# Patient Record
Sex: FEMALE | Race: WHITE | NOT HISPANIC OR LATINO | Employment: OTHER | URBAN - METROPOLITAN AREA
[De-identification: names, ages, dates, MRNs, and addresses within clinical notes are randomized per-mention and may not be internally consistent; named-entity substitution may affect disease eponyms.]

---

## 2017-01-05 ENCOUNTER — HOSPITAL ENCOUNTER (OUTPATIENT)
Dept: MAMMOGRAPHY | Facility: HOSPITAL | Age: 77
Discharge: HOME | End: 2017-01-05

## 2017-01-06 ENCOUNTER — OFFICE VISIT (OUTPATIENT)
Dept: NEUROLOGY | Facility: CLINIC | Age: 77
End: 2017-01-06

## 2017-01-06 VITALS
HEIGHT: 66 IN | DIASTOLIC BLOOD PRESSURE: 71 MMHG | SYSTOLIC BLOOD PRESSURE: 120 MMHG | BODY MASS INDEX: 28.12 KG/M2 | WEIGHT: 175 LBS

## 2017-01-06 DIAGNOSIS — G45.8 OTHER SPECIFIED TRANSIENT CEREBRAL ISCHEMIAS: Primary | ICD-10-CM

## 2017-01-06 PROCEDURE — 99213 OFFICE O/P EST LOW 20 MIN: CPT | Performed by: PSYCHIATRY & NEUROLOGY

## 2017-01-06 RX ORDER — TOPIRAMATE 25 MG/1
25 TABLET ORAL NIGHTLY
Qty: 60 TABLET | Refills: 5 | Status: SHIPPED | OUTPATIENT
Start: 2017-01-06 | End: 2017-07-07 | Stop reason: SDUPTHER

## 2017-01-06 RX ORDER — INFLUENZA A VIRUS A/MICHIGAN/45/2015 X-275 (H1N1) ANTIGEN (FORMALDEHYDE INACTIVATED), INFLUENZA A VIRUS A/SINGAPORE/INFIMH-16-0019/2016 IVR-186 (H3N2) ANTIGEN (FORMALDEHYDE INACTIVATED), AND INFLUENZA B VIRUS B/MARYLAND/15/2016 BX-69A (A B/COLORADO/6/2017-LIKE VIRUS) ANTIGEN (FORMALDEHYDE INACTIVATED) 60; 60; 60 UG/.5ML; UG/.5ML; UG/.5ML
INJECTION, SUSPENSION INTRAMUSCULAR
Refills: 0 | COMMUNITY
Start: 2016-10-18 | End: 2019-10-31

## 2017-01-06 RX ORDER — PNEUMOCOCCAL VACCINE POLYVALENT 25; 25; 25; 25; 25; 25; 25; 25; 25; 25; 25; 25; 25; 25; 25; 25; 25; 25; 25; 25; 25; 25; 25 UG/.5ML; UG/.5ML; UG/.5ML; UG/.5ML; UG/.5ML; UG/.5ML; UG/.5ML; UG/.5ML; UG/.5ML; UG/.5ML; UG/.5ML; UG/.5ML; UG/.5ML; UG/.5ML; UG/.5ML; UG/.5ML; UG/.5ML; UG/.5ML; UG/.5ML; UG/.5ML; UG/.5ML; UG/.5ML; UG/.5ML
INJECTION, SOLUTION INTRAMUSCULAR; SUBCUTANEOUS
Refills: 0 | COMMUNITY
Start: 2016-10-18 | End: 2019-10-31

## 2017-01-06 NOTE — LETTER
January 6, 2017     Mg Irby MD  [address] [facility]   [address]  [place] [address]    Patient: Brooke Molina   YOB: 1940   Date of Visit: 1/6/2017       Dear Dr. Mahamed MD:    Thank you for referring Brooke Molina to me for evaluation. Below are the relevant portions of my assessment and plan of care.    If you have questions, please do not hesitate to call me. I look forward to following Brooke along with you.         Sincerely,        Delvin Reed MD        CC: No Recipients  Delvin Reed MD  1/6/2017 12:59 PM  Signed  Subjective:     Patient ID: Brooke Molina is a 76 y.o. female.    History of Present Illness     A she continues have episodes off and on where she will get visual obscurations like looking through [place] this may not occur for a month or time or she may have clusters of these often times she has a headache associated with after the episodes occur or visual obscuration.  She's had a full vascular workup while hospitalized.  She is currently wearing a cardiac monitor and she's had no episodes over the past couple of months.  The following portions of the patient's history were reviewed and updated as appropriate: allergies, current medications, past family history, past medical history, past social history, past surgical history and problem list.      Current Outpatient Prescriptions:   •  aspirin 81 MG chewable tablet, Chew 81 mg daily., Disp: , Rfl:   •  atorvastatin (LIPITOR) 20 MG tablet, Take 20 mg by mouth 1 (one) time daily., Disp: , Rfl:   •  cholecalciferol (VITAMIN D3) 1000 UNITS tablet, Take 1,000 Units by mouth daily., Disp: , Rfl:   •  FLUZONE HIGH-DOSE 0.5 ML suspension prefilled syringe injection, INJECT 0.5 ML INTRAMUSCULARLY ONE TIME ONLY, Disp: , Rfl: 0  •  metoprolol tartrate (LOPRESSOR) 50 MG tablet, Take 50 mg by mouth 2 (two) times a day., Disp: , Rfl:   •  PNEUMOVAX 23 25 MCG/0.5ML vaccine, INJECT 0.5 ML  INTRAMUSCULARLY ONE TIME ONLY, Disp: , Rfl: 0  •  vitamin B-12 (CYANOCOBALAMIN) 500 MCG tablet, Take 500 mcg by mouth daily., Disp: , Rfl:   •  topiramate (TOPAMAX) 25 MG tablet, Take 1 tablet by mouth Every Night., Disp: 60 tablet, Rfl: 5    Review of Systems   Constitutional: Negative.    Eyes: Positive for visual disturbance. Negative for pain, discharge, redness and itching.   Gastrointestinal: Positive for nausea. Negative for abdominal distention, abdominal pain, anal bleeding, blood in stool, constipation, diarrhea, rectal pain and vomiting.   Neurological: Positive for dizziness, light-headedness, numbness and headaches. Negative for tremors, seizures, syncope, facial asymmetry, speech difficulty and weakness.   Psychiatric/Behavioral: Positive for sleep disturbance. Negative for agitation, behavioral problems, confusion, decreased concentration, dysphoric mood, hallucinations, self-injury and suicidal ideas. The patient is not nervous/anxious and is not hyperactive.         Objective:    Neurologic Exam  Mental status examination was appropriate.  Funduscopy, visual fields, eye movements and pupillary reflexes were normal.  No facial weakness was noted.  Gait was normal.  No pattern of focal weakness was noted.  Physical Exam    Assessment/Plan:     Brooke was seen today for cerebrovascular accident.    Diagnoses and all orders for this visit:    Other specified transient cerebral ischemias    Other orders  -     topiramate (TOPAMAX) 25 MG tablet; Take 1 tablet by mouth Every Night.         Certainly the episodes may be migraine equivalents her age and presentation bit atypical.  However she's had full vascular workup and continues to have cardiac evaluation.  I feel that an antimigraine medicine is certainly worth a trial.    Prescription drug management - topiramate 25 milligrams at night    Phone follow-up in 6 weeks.  Follow-up in the office in 6 months.  Thank you for allowing me to share in the care  of this patient.  Delvin Reed M.D.

## 2017-01-06 NOTE — MR AVS SNAPSHOT
Brooke Molina   1/6/2017 12:45 PM   Office Visit    Dept Phone:  [phone]   Encounter #:  [id number]    Provider:  Delvin Reed Jr., MD   Department:  [facility] NEUROLOGY                Your Full Care Plan              Today's Medication Changes          These changes are accurate as of: 1/6/17 12:51 PM.  If you have any questions, ask your nurse or doctor.               Stop taking medication(s)listed here:     clopidogrel 75 MG tablet   Commonly known as:  PLAVIX   Stopped by:  Delvin Reed Jr., MD           oxybutynin XL 10 MG 24 hr tablet   Commonly known as:  DITROPAN-XL   Stopped by:  Delvin Reed Jr., MD                      Your Updated Medication List          This list is accurate as of: 1/6/17 12:51 PM.  Always use your most recent med list.                aspirin 81 MG chewable tablet       atorvastatin 20 MG tablet   Commonly known as:  LIPITOR       cholecalciferol 1000 UNITS tablet   Commonly known as:  VITAMIN D3       FLUZONE HIGH-DOSE 0.5 ML suspension prefilled syringe injection   Generic drug:  influenza vac split high-dose       metoprolol tartrate 50 MG tablet   Commonly known as:  LOPRESSOR       PNEUMOVAX 23 25 MCG/0.5ML vaccine   Generic drug:  pneumococcal polysaccharide 23-valent       vitamin B-12 500 MCG tablet   Commonly known as:  CYANOCOBALAMIN               Instructions     None    Patient Instructions History      Upcoming Appointments     Visit Type Date Time Department    FOLLOW UP 1/6/2017 12:45 PM [facility] NEUROLOGY [place]      [place] Signup     Our records indicate that you have declined [facility] sign[facility]. If you would like to sign up for [facility], please email [email or web address] or call [phone] to obtain an activation code.             Other Info from Your Visit           Allergies     Codeine  GI Intolerance      Reason for Visit     Cerebrovascular Accident           Vital Signs     "Blood Pressure Height Weight Body Mass Index Smoking Status       120/71 66\" (167.6 cm) 175 lb (79.4 kg) 28.25 kg/m2 Never Smoker         "

## 2017-01-06 NOTE — PROGRESS NOTES
Subjective:     Patient ID: Brooke Molina is a 76 y.o. female.    History of Present Illness     A she continues have episodes off and on where she will get visual obscurations like looking through [place] this may not occur for a month or time or she may have clusters of these often times she has a headache associated with after the episodes occur or visual obscuration.  She's had a full vascular workup while hospitalized.  She is currently wearing a cardiac monitor and she's had no episodes over the past couple of months.  The following portions of the patient's history were reviewed and updated as appropriate: allergies, current medications, past family history, past medical history, past social history, past surgical history and problem list.      Current Outpatient Prescriptions:   •  aspirin 81 MG chewable tablet, Chew 81 mg daily., Disp: , Rfl:   •  atorvastatin (LIPITOR) 20 MG tablet, Take 20 mg by mouth 1 (one) time daily., Disp: , Rfl:   •  cholecalciferol (VITAMIN D3) 1000 UNITS tablet, Take 1,000 Units by mouth daily., Disp: , Rfl:   •  FLUZONE HIGH-DOSE 0.5 ML suspension prefilled syringe injection, INJECT 0.5 ML INTRAMUSCULARLY ONE TIME ONLY, Disp: , Rfl: 0  •  metoprolol tartrate (LOPRESSOR) 50 MG tablet, Take 50 mg by mouth 2 (two) times a day., Disp: , Rfl:   •  PNEUMOVAX 23 25 MCG/0.5ML vaccine, INJECT 0.5 ML INTRAMUSCULARLY ONE TIME ONLY, Disp: , Rfl: 0  •  vitamin B-12 (CYANOCOBALAMIN) 500 MCG tablet, Take 500 mcg by mouth daily., Disp: , Rfl:   •  topiramate (TOPAMAX) 25 MG tablet, Take 1 tablet by mouth Every Night., Disp: 60 tablet, Rfl: 5    Review of Systems   Constitutional: Negative.    Eyes: Positive for visual disturbance. Negative for pain, discharge, redness and itching.   Gastrointestinal: Positive for nausea. Negative for abdominal distention, abdominal pain, anal bleeding, blood in stool, constipation, diarrhea, rectal pain and vomiting.   Neurological: Positive for dizziness,  light-headedness, numbness and headaches. Negative for tremors, seizures, syncope, facial asymmetry, speech difficulty and weakness.   Psychiatric/Behavioral: Positive for sleep disturbance. Negative for agitation, behavioral problems, confusion, decreased concentration, dysphoric mood, hallucinations, self-injury and suicidal ideas. The patient is not nervous/anxious and is not hyperactive.         Objective:    Neurologic Exam  Mental status examination was appropriate.  Funduscopy, visual fields, eye movements and pupillary reflexes were normal.  No facial weakness was noted.  Gait was normal.  No pattern of focal weakness was noted.  Physical Exam    Assessment/Plan:     Brooke was seen today for cerebrovascular accident.    Diagnoses and all orders for this visit:    Other specified transient cerebral ischemias    Other orders  -     topiramate (TOPAMAX) 25 MG tablet; Take 1 tablet by mouth Every Night.         Certainly the episodes may be migraine equivalents her age and presentation bit atypical.  However she's had full vascular workup and continues to have cardiac evaluation.  I feel that an antimigraine medicine is certainly worth a trial.    Prescription drug management - topiramate 25 milligrams at night    Phone follow-up in 6 weeks.  Follow-up in the office in 6 months.  Thank you for allowing me to share in the care of this patient.  Delvin Reed M.D.

## 2017-05-02 ENCOUNTER — OFFICE (AMBULATORY)
Dept: URBAN - METROPOLITAN AREA CLINIC 64 | Facility: CLINIC | Age: 77
End: 2017-05-02

## 2017-05-02 ENCOUNTER — ON CAMPUS - OUTPATIENT (AMBULATORY)
Dept: URBAN - METROPOLITAN AREA HOSPITAL 2 | Facility: HOSPITAL | Age: 77
End: 2017-05-02

## 2017-05-02 ENCOUNTER — HOSPITAL ENCOUNTER (OUTPATIENT)
Dept: OTHER | Facility: HOSPITAL | Age: 77
Setting detail: SPECIMEN
Discharge: HOME | End: 2017-05-02
Attending: INTERNAL MEDICINE | Admitting: INTERNAL MEDICINE

## 2017-05-02 VITALS
OXYGEN SATURATION: 98 % | RESPIRATION RATE: 16 BRPM | DIASTOLIC BLOOD PRESSURE: 79 MMHG | DIASTOLIC BLOOD PRESSURE: 63 MMHG | HEIGHT: 66 IN | DIASTOLIC BLOOD PRESSURE: 54 MMHG | SYSTOLIC BLOOD PRESSURE: 122 MMHG | RESPIRATION RATE: 18 BRPM | HEART RATE: 67 BPM | DIASTOLIC BLOOD PRESSURE: 73 MMHG | WEIGHT: 168 LBS | HEART RATE: 71 BPM | DIASTOLIC BLOOD PRESSURE: 75 MMHG | HEART RATE: 65 BPM | DIASTOLIC BLOOD PRESSURE: 66 MMHG | DIASTOLIC BLOOD PRESSURE: 64 MMHG | SYSTOLIC BLOOD PRESSURE: 127 MMHG | TEMPERATURE: 97.5 F | HEART RATE: 54 BPM | OXYGEN SATURATION: 99 % | OXYGEN SATURATION: 100 % | SYSTOLIC BLOOD PRESSURE: 117 MMHG | DIASTOLIC BLOOD PRESSURE: 72 MMHG | SYSTOLIC BLOOD PRESSURE: 152 MMHG | SYSTOLIC BLOOD PRESSURE: 120 MMHG | SYSTOLIC BLOOD PRESSURE: 136 MMHG | SYSTOLIC BLOOD PRESSURE: 144 MMHG | HEART RATE: 61 BPM | SYSTOLIC BLOOD PRESSURE: 123 MMHG | HEART RATE: 63 BPM | HEART RATE: 58 BPM

## 2017-05-02 DIAGNOSIS — K57.30 DIVERTICULOSIS OF LARGE INTESTINE WITHOUT PERFORATION OR ABS: ICD-10-CM

## 2017-05-02 DIAGNOSIS — K62.1 RECTAL POLYP: ICD-10-CM

## 2017-05-02 DIAGNOSIS — Z86.010 PERSONAL HISTORY OF COLONIC POLYPS: ICD-10-CM

## 2017-05-02 LAB
GI HISTOLOGY: A. UNSPECIFIED: (no result)
GI HISTOLOGY: PDF REPORT: (no result)

## 2017-05-02 PROCEDURE — 45385 COLONOSCOPY W/LESION REMOVAL: CPT | Performed by: INTERNAL MEDICINE

## 2017-05-02 PROCEDURE — 88305 TISSUE EXAM BY PATHOLOGIST: CPT | Mod: 26 | Performed by: INTERNAL MEDICINE

## 2017-05-02 RX ADMIN — PROPOFOL: 10 INJECTION, EMULSION INTRAVENOUS at 15:00

## 2017-07-07 ENCOUNTER — OFFICE VISIT (OUTPATIENT)
Dept: NEUROLOGY | Facility: CLINIC | Age: 77
End: 2017-07-07

## 2017-07-07 VITALS
DIASTOLIC BLOOD PRESSURE: 60 MMHG | HEIGHT: 66 IN | SYSTOLIC BLOOD PRESSURE: 100 MMHG | WEIGHT: 175 LBS | BODY MASS INDEX: 28.12 KG/M2

## 2017-07-07 DIAGNOSIS — G43.109 MIGRAINE WITH AURA AND WITHOUT STATUS MIGRAINOSUS, NOT INTRACTABLE: Primary | ICD-10-CM

## 2017-07-07 PROCEDURE — 99212 OFFICE O/P EST SF 10 MIN: CPT | Performed by: PSYCHIATRY & NEUROLOGY

## 2017-07-07 RX ORDER — TOPIRAMATE 25 MG/1
25 TABLET ORAL NIGHTLY
Qty: 90 TABLET | Refills: 11 | Status: SHIPPED | OUTPATIENT
Start: 2017-07-07 | End: 2018-02-09 | Stop reason: SDUPTHER

## 2017-07-07 NOTE — PROGRESS NOTES
Subjective:     Patient ID: Brooke Molina is a 76 y.o. female.    History of Present Illness     Since starting low-dose Topamax the patient's symptoms have almost totally resolved.  She's had a couple breakthroughs but with less numbness associated with the attacks.  She is extremely happy with her current treatment.  No side effects.  No new problems.  The following portions of the patient's history were reviewed and updated as appropriate: allergies, current medications, past family history, past medical history, past social history, past surgical history and problem list.       Current Outpatient Prescriptions:   •  aspirin 81 MG chewable tablet, Chew 81 mg daily., Disp: , Rfl:   •  atorvastatin (LIPITOR) 20 MG tablet, Take 20 mg by mouth 1 (one) time daily., Disp: , Rfl:   •  cholecalciferol (VITAMIN D3) 1000 UNITS tablet, Take 1,000 Units by mouth daily., Disp: , Rfl:   •  FLUZONE HIGH-DOSE 0.5 ML suspension prefilled syringe injection, INJECT 0.5 ML INTRAMUSCULARLY ONE TIME ONLY, Disp: , Rfl: 0  •  metoprolol tartrate (LOPRESSOR) 50 MG tablet, Take 50 mg by mouth 2 (two) times a day., Disp: , Rfl:   •  PNEUMOVAX 23 25 MCG/0.5ML vaccine, INJECT 0.5 ML INTRAMUSCULARLY ONE TIME ONLY, Disp: , Rfl: 0  •  topiramate (TOPAMAX) 25 MG tablet, Take 1 tablet by mouth Every Night., Disp: 90 tablet, Rfl: 11  •  vitamin B-12 (CYANOCOBALAMIN) 500 MCG tablet, Take 500 mcg by mouth daily., Disp: , Rfl:           Review of Systems   Constitutional: Negative.    Neurological: Negative.    Psychiatric/Behavioral: Negative.         Objective:    Neurologic Exam  Mental status examination was appropriate.  Funduscopy, visual fields, eye movements and pupillary reflexes were normal.  No facial weakness was noted.  Gait was normal.  No pattern of focal weakness was noted.  Physical Exam    Assessment/Plan:     Brooke was seen today for migraine.    Diagnoses and all orders for this visit:    Migraine with aura and without status  migrainosus, not intractable    Other orders  -     topiramate (TOPAMAX) 25 MG tablet; Take 1 tablet by mouth Every Night.       Prescription drug management - topiramate as above    Follow-up in the office in one year.Thank you for allowing me to share in the care of this patient.  Delvin Reed M.D.

## 2017-09-08 ENCOUNTER — HOSPITAL ENCOUNTER (OUTPATIENT)
Dept: GENERAL RADIOLOGY | Facility: HOSPITAL | Age: 77
Discharge: HOME | End: 2017-09-08
Attending: INTERNAL MEDICINE | Admitting: INTERNAL MEDICINE

## 2017-12-12 ENCOUNTER — HOSPITAL ENCOUNTER (OUTPATIENT)
Dept: FAMILY MEDICINE CLINIC | Facility: CLINIC | Age: 77
Setting detail: SPECIMEN
Discharge: HOME | End: 2017-12-12
Attending: INTERNAL MEDICINE | Admitting: INTERNAL MEDICINE

## 2017-12-12 LAB
BACTERIA SPEC AEROBE CULT: NORMAL
BASOPHILS # BLD AUTO: 0 10*3/UL (ref 0–0.2)
BASOPHILS NFR BLD AUTO: 1 % (ref 0–2)
CK SERPL-CCNC: 68 IU/L (ref 38–234)
DIFFERENTIAL METHOD BLD: (no result)
EOSINOPHIL # BLD AUTO: 0.1 10*3/UL (ref 0–0.3)
EOSINOPHIL # BLD AUTO: 2 % (ref 0–3)
ERYTHROCYTE [DISTWIDTH] IN BLOOD BY AUTOMATED COUNT: 13.1 % (ref 11.5–14.5)
ERYTHROCYTE [SEDIMENTATION RATE] IN BLOOD BY WESTERGREN METHOD: 10 MM/HR (ref 0–30)
FERRITIN SERPL-MCNC: 97 NG/ML (ref 11–307)
HCT VFR BLD AUTO: 40.9 % (ref 35–49)
HGB BLD-MCNC: 13.8 G/DL (ref 12–15)
IRON SATN MFR SERPL: 24 % (ref 15–50)
IRON SERPL-MCNC: 85 UG/DL (ref 28–170)
LYMPHOCYTES # BLD AUTO: 1.5 10*3/UL (ref 0.8–4.8)
LYMPHOCYTES NFR BLD AUTO: 23 % (ref 18–42)
Lab: NORMAL
MCH RBC QN AUTO: 32.7 PG (ref 26–32)
MCHC RBC AUTO-ENTMCNC: 33.7 G/DL (ref 32–36)
MCV RBC AUTO: 97 FL (ref 80–94)
MICRO REPORT STATUS: NORMAL
MONOCYTES # BLD AUTO: 0.5 10*3/UL (ref 0.1–1.3)
MONOCYTES NFR BLD AUTO: 7 % (ref 2–11)
NEUTROPHILS # BLD AUTO: 4.4 10*3/UL (ref 2.3–8.6)
NEUTROPHILS NFR BLD AUTO: 67 % (ref 50–75)
NRBC BLD AUTO-RTO: 0 /100{WBCS}
NRBC/RBC NFR BLD MANUAL: 0 10*3/UL
PLATELET # BLD AUTO: 161 10*3/UL (ref 150–450)
PMV BLD AUTO: 8.5 FL (ref 7.4–10.4)
RBC # BLD AUTO: 4.21 10*6/UL (ref 4–5.4)
SPECIMEN SOURCE: NORMAL
TIBC SERPL-MCNC: 352 UG/DL (ref 228–428)
TSH SERPL-ACNC: 0.97 UIU/ML (ref 0.34–5.6)
WBC # BLD AUTO: 6.6 10*3/UL (ref 4.5–11.5)

## 2017-12-13 LAB
ANA SER QL IA: NORMAL

## 2018-02-09 ENCOUNTER — OFFICE VISIT (OUTPATIENT)
Dept: NEUROLOGY | Facility: CLINIC | Age: 78
End: 2018-02-09

## 2018-02-09 VITALS
HEIGHT: 66 IN | DIASTOLIC BLOOD PRESSURE: 80 MMHG | SYSTOLIC BLOOD PRESSURE: 120 MMHG | WEIGHT: 178 LBS | BODY MASS INDEX: 28.61 KG/M2

## 2018-02-09 DIAGNOSIS — G43.109 MIGRAINE WITH AURA AND WITHOUT STATUS MIGRAINOSUS, NOT INTRACTABLE: ICD-10-CM

## 2018-02-09 DIAGNOSIS — G45.9 TRANSIENT CEREBRAL ISCHEMIA, UNSPECIFIED TYPE: Primary | ICD-10-CM

## 2018-02-09 PROCEDURE — 99213 OFFICE O/P EST LOW 20 MIN: CPT | Performed by: PSYCHIATRY & NEUROLOGY

## 2018-02-09 RX ORDER — TEMAZEPAM 15 MG/1
CAPSULE ORAL
COMMUNITY
Start: 2017-12-13 | End: 2018-05-25 | Stop reason: ALTCHOICE

## 2018-02-09 RX ORDER — PREDNISONE 20 MG/1
20 TABLET ORAL DAILY
COMMUNITY
End: 2018-05-25 | Stop reason: ALTCHOICE

## 2018-02-09 RX ORDER — ZOLPIDEM TARTRATE 10 MG/1
TABLET ORAL
COMMUNITY
Start: 2018-01-05 | End: 2019-06-14

## 2018-02-09 RX ORDER — TOPIRAMATE 50 MG/1
50 TABLET, FILM COATED ORAL NIGHTLY
Qty: 90 TABLET | Refills: 11 | Status: SHIPPED | OUTPATIENT
Start: 2018-02-09 | End: 2018-05-25 | Stop reason: SDUPTHER

## 2018-02-09 NOTE — PROGRESS NOTES
Subjective:     Patient ID: Brooke Molina is a 77 y.o. female.    History of Present Illness     The patient has had a couple more episodes.  They both included amnesia.  One occurred in September while she was giving a talk.  She got away with it but she realizes there were several things she does not remember about what she did check another one in mid January.  This was mainly amnesia.  She's never had an EEG.  She is on topiramate 25 mg at night which has reduced her migraine type episodes or visual migraine quite a bit.  No side effects.  The following portions of the patient's history were reviewed and updated as appropriate: allergies, current medications, past family history, past medical history, past social history, past surgical history and problem list.      Current Outpatient Prescriptions:   •  aspirin 81 MG chewable tablet, Chew 81 mg daily., Disp: , Rfl:   •  atorvastatin (LIPITOR) 20 MG tablet, Take 20 mg by mouth 1 (one) time daily., Disp: , Rfl:   •  cholecalciferol (VITAMIN D3) 1000 UNITS tablet, Take 1,000 Units by mouth daily., Disp: , Rfl:   •  FLUZONE HIGH-DOSE 0.5 ML suspension prefilled syringe injection, INJECT 0.5 ML INTRAMUSCULARLY ONE TIME ONLY, Disp: , Rfl: 0  •  metoprolol tartrate (LOPRESSOR) 50 MG tablet, Take 50 mg by mouth 2 (two) times a day., Disp: , Rfl:   •  PNEUMOVAX 23 25 MCG/0.5ML vaccine, INJECT 0.5 ML INTRAMUSCULARLY ONE TIME ONLY, Disp: , Rfl: 0  •  predniSONE (DELTASONE) 20 MG tablet, Take 20 mg by mouth Daily., Disp: , Rfl:   •  temazepam (RESTORIL) 15 MG capsule, , Disp: , Rfl:   •  topiramate (TOPAMAX) 50 MG tablet, Take 1 tablet by mouth Every Night., Disp: 90 tablet, Rfl: 11  •  vitamin B-12 (CYANOCOBALAMIN) 500 MCG tablet, Take 500 mcg by mouth daily., Disp: , Rfl:   •  zolpidem (AMBIEN) 10 MG tablet, , Disp: , Rfl:     Review of Systems   Constitutional: Negative.    Neurological: Negative.    Psychiatric/Behavioral: Positive for confusion. Negative for  agitation, behavioral problems, decreased concentration, dysphoric mood, hallucinations, self-injury, sleep disturbance and suicidal ideas. The patient is not nervous/anxious and is not hyperactive.         Objective:    Neurologic Exam  Mental status examination was appropriate.  Funduscopy, visual fields, eye movements and pupillary reflexes were normal.  No facial weakness was noted.  Gait was normal.  No pattern of focal weakness was noted.  Physical Exam    Assessment/Plan:     Brooke was seen today for migraine.    Diagnoses and all orders for this visit:    Transient cerebral ischemia, unspecified type  -     MRI Brain With & Without Contrast; Future  -     EEG Awake or Asleep Routine; Future    Migraine with aura and without status migrainosus, not intractable  -     EEG Awake or Asleep Routine; Future    Other orders  -     topiramate (TOPAMAX) 50 MG tablet; Take 1 tablet by mouth Every Night.       She has never had an EEG at the episodes are probably atypical migraine or partial seizure.  I have set up an MRI the brain with and without contrast and EEG.  Also increased topiramate to 50 mg at night.  Call after testing.  Follow up in the office in 3 months.   Thank you for allowing me to share in the care of this patient.  Delvin Reed M.D.

## 2018-02-19 ENCOUNTER — HOSPITAL ENCOUNTER (OUTPATIENT)
Dept: OTHER | Facility: HOSPITAL | Age: 78
Discharge: HOME | End: 2018-02-19
Attending: PSYCHIATRY & NEUROLOGY | Admitting: PSYCHIATRY & NEUROLOGY

## 2018-02-19 ENCOUNTER — OUTSIDE FACILITY SERVICE (OUTPATIENT)
Dept: NEUROLOGY | Facility: CLINIC | Age: 78
End: 2018-02-19

## 2018-02-19 LAB — CREAT BLDA-MCNC: 0.7 MG/DL (ref 0.6–1.3)

## 2018-02-19 PROCEDURE — 95816 EEG AWAKE AND DROWSY: CPT | Performed by: PSYCHIATRY & NEUROLOGY

## 2018-05-25 ENCOUNTER — OFFICE VISIT (OUTPATIENT)
Dept: NEUROLOGY | Facility: CLINIC | Age: 78
End: 2018-05-25

## 2018-05-25 VITALS
WEIGHT: 177 LBS | BODY MASS INDEX: 28.45 KG/M2 | SYSTOLIC BLOOD PRESSURE: 110 MMHG | HEIGHT: 66 IN | DIASTOLIC BLOOD PRESSURE: 70 MMHG

## 2018-05-25 DIAGNOSIS — G43.109 MIGRAINE WITH AURA AND WITHOUT STATUS MIGRAINOSUS, NOT INTRACTABLE: Primary | ICD-10-CM

## 2018-05-25 DIAGNOSIS — G45.1 HEMISPHERIC CAROTID ARTERY SYNDROME: ICD-10-CM

## 2018-05-25 PROCEDURE — 99213 OFFICE O/P EST LOW 20 MIN: CPT | Performed by: PSYCHIATRY & NEUROLOGY

## 2018-05-25 RX ORDER — DIPHENOXYLATE HYDROCHLORIDE AND ATROPINE SULFATE 2.5; .025 MG/1; MG/1
1 TABLET ORAL DAILY
COMMUNITY

## 2018-05-25 RX ORDER — CHLORAL HYDRATE 500 MG
1000 CAPSULE ORAL
COMMUNITY

## 2018-05-25 RX ORDER — TOPIRAMATE 50 MG/1
50 TABLET, FILM COATED ORAL NIGHTLY
Qty: 90 TABLET | Refills: 3 | Status: SHIPPED | OUTPATIENT
Start: 2018-05-25 | End: 2019-05-25

## 2018-05-25 NOTE — PROGRESS NOTES
Subjective:     Patient ID: Brooke Molina is a 77 y.o. female.    History of Present Illness     No more episodes since her last visit.  Tolerating topiramate 50 mg at night well.  Since her last visit she had an MRI the brain which showed small vessel disease and an EEG that was normal.  No new problems.  Happy with her current treatment.  The following portions of the patient's history were reviewed and updated as appropriate: allergies, current medications, past family history, past medical history, past social history, past surgical history and problem list.      Current Outpatient Prescriptions:   •  aspirin 81 MG chewable tablet, Chew 81 mg daily., Disp: , Rfl:   •  atorvastatin (LIPITOR) 20 MG tablet, Take 20 mg by mouth 1 (one) time daily., Disp: , Rfl:   •  cholecalciferol (VITAMIN D3) 1000 UNITS tablet, Take 1,000 Units by mouth daily., Disp: , Rfl:   •  FLUZONE HIGH-DOSE 0.5 ML suspension prefilled syringe injection, INJECT 0.5 ML INTRAMUSCULARLY ONE TIME ONLY, Disp: , Rfl: 0  •  metoprolol tartrate (LOPRESSOR) 50 MG tablet, Take 50 mg by mouth 2 (two) times a day., Disp: , Rfl:   •  Multiple Vitamin (MULTI VITAMIN DAILY PO), Take  by mouth., Disp: , Rfl:   •  Omega-3 Fatty Acids (FISH OIL) 1000 MG capsule capsule, Take  by mouth Daily With Breakfast., Disp: , Rfl:   •  PNEUMOVAX 23 25 MCG/0.5ML vaccine, INJECT 0.5 ML INTRAMUSCULARLY ONE TIME ONLY, Disp: , Rfl: 0  •  topiramate (TOPAMAX) 50 MG tablet, Take 1 tablet by mouth Every Night., Disp: 90 tablet, Rfl: 3  •  vitamin B-12 (CYANOCOBALAMIN) 500 MCG tablet, Take 500 mcg by mouth daily., Disp: , Rfl:   •  zolpidem (AMBIEN) 10 MG tablet, , Disp: , Rfl:     Review of Systems   Constitutional: Negative.    Neurological: Negative.    Psychiatric/Behavioral: Negative.         Objective:    Neurologic Exam  Mental status examination was appropriate.  Funduscopy, visual fields, eye movements and pupillary reflexes were normal.  No facial weakness was  noted.  Gait was normal.  No pattern of focal weakness was noted.  Physical Exam    Assessment/Plan:     Brooke was seen today for transient ischemic attack and migraine.    Diagnoses and all orders for this visit:    Migraine with aura and without status migrainosus, not intractable    Hemispheric carotid artery syndrome    Other orders  -     topiramate (TOPAMAX) 50 MG tablet; Take 1 tablet by mouth Every Night.         Prescription drug management - meds as above    Follow-up in the office in one year. Thank you for allowing me to share in the care of this patient.  Delvin Reed M.D.

## 2018-08-15 ENCOUNTER — HOSPITAL ENCOUNTER (OUTPATIENT)
Dept: MAMMOGRAPHY | Facility: HOSPITAL | Age: 78
Discharge: HOME | End: 2018-08-15

## 2018-08-16 ENCOUNTER — HOSPITAL ENCOUNTER (OUTPATIENT)
Dept: CARDIOLOGY | Facility: HOSPITAL | Age: 78
Discharge: HOME | End: 2018-08-16
Attending: INTERNAL MEDICINE | Admitting: INTERNAL MEDICINE

## 2018-08-24 ENCOUNTER — HOSPITAL ENCOUNTER (OUTPATIENT)
Dept: MAMMOGRAPHY | Facility: HOSPITAL | Age: 78
Discharge: HOME | End: 2018-08-24
Attending: INTERNAL MEDICINE | Admitting: INTERNAL MEDICINE

## 2018-09-01 ENCOUNTER — HOSPITAL ENCOUNTER (OUTPATIENT)
Dept: GENERAL RADIOLOGY | Facility: HOSPITAL | Age: 78
Discharge: HOME | End: 2018-09-01
Attending: INTERNAL MEDICINE | Admitting: INTERNAL MEDICINE

## 2018-12-28 ENCOUNTER — OFFICE (AMBULATORY)
Dept: URBAN - METROPOLITAN AREA CLINIC 64 | Facility: CLINIC | Age: 78
End: 2018-12-28

## 2018-12-28 VITALS
HEIGHT: 66 IN | WEIGHT: 200 LBS | SYSTOLIC BLOOD PRESSURE: 126 MMHG | HEART RATE: 65 BPM | DIASTOLIC BLOOD PRESSURE: 81 MMHG

## 2018-12-28 DIAGNOSIS — K21.9 GASTRO-ESOPHAGEAL REFLUX DISEASE WITHOUT ESOPHAGITIS: ICD-10-CM

## 2018-12-28 DIAGNOSIS — R05 COUGH: ICD-10-CM

## 2018-12-28 DIAGNOSIS — R07.9 CHEST PAIN, UNSPECIFIED: ICD-10-CM

## 2018-12-28 PROCEDURE — 99214 OFFICE O/P EST MOD 30 MIN: CPT | Performed by: NURSE PRACTITIONER

## 2018-12-28 RX ORDER — RANITIDINE 300 MG/1
300 TABLET ORAL
Qty: 30 | Refills: 11 | Status: COMPLETED
Start: 2018-12-28 | End: 2019-01-22

## 2019-01-22 ENCOUNTER — HOSPITAL ENCOUNTER (OUTPATIENT)
Dept: GASTROENTEROLOGY | Facility: HOSPITAL | Age: 79
Setting detail: HOSPITAL OUTPATIENT SURGERY
Discharge: HOME | End: 2019-01-22
Attending: INTERNAL MEDICINE | Admitting: INTERNAL MEDICINE

## 2019-01-22 ENCOUNTER — ON CAMPUS - OUTPATIENT (AMBULATORY)
Dept: URBAN - METROPOLITAN AREA HOSPITAL 85 | Facility: HOSPITAL | Age: 79
End: 2019-01-22

## 2019-01-22 DIAGNOSIS — K21.9 GASTRO-ESOPHAGEAL REFLUX DISEASE WITHOUT ESOPHAGITIS: ICD-10-CM

## 2019-01-22 DIAGNOSIS — R05 COUGH: ICD-10-CM

## 2019-01-22 PROCEDURE — 43235 EGD DIAGNOSTIC BRUSH WASH: CPT | Performed by: INTERNAL MEDICINE

## 2019-01-24 ENCOUNTER — HOSPITAL ENCOUNTER (OUTPATIENT)
Dept: NUCLEAR MEDICINE | Facility: HOSPITAL | Age: 79
Discharge: HOME | End: 2019-01-24
Attending: INTERNAL MEDICINE | Admitting: INTERNAL MEDICINE

## 2019-01-29 ENCOUNTER — HOSPITAL ENCOUNTER (OUTPATIENT)
Dept: FAMILY MEDICINE CLINIC | Facility: CLINIC | Age: 79
Setting detail: SPECIMEN
Discharge: HOME | End: 2019-01-29
Attending: INTERNAL MEDICINE | Admitting: INTERNAL MEDICINE

## 2019-01-29 LAB
ALBUMIN SERPL-MCNC: 3.7 G/DL (ref 3.5–4.8)
ALBUMIN/GLOB SERPL: 1.4 {RATIO} (ref 1–1.7)
ALP SERPL-CCNC: 60 IU/L (ref 32–91)
ALT SERPL-CCNC: 29 IU/L (ref 14–54)
ANION GAP SERPL CALC-SCNC: 13 MMOL/L (ref 10–20)
AST SERPL-CCNC: 22 IU/L (ref 15–41)
BILIRUB SERPL-MCNC: 0.5 MG/DL (ref 0.3–1.2)
BUN SERPL-MCNC: 20 MG/DL (ref 8–20)
BUN/CREAT SERPL: 28.6 (ref 5.4–26.2)
CALCIUM SERPL-MCNC: 9.4 MG/DL (ref 8.9–10.3)
CHLORIDE SERPL-SCNC: 104 MMOL/L (ref 101–111)
CHOLEST SERPL-MCNC: 165 MG/DL
CHOLEST/HDLC SERPL: 2.4 {RATIO}
CONV CO2: 25 MMOL/L (ref 22–32)
CONV LDL CHOLESTEROL DIRECT: 82 MG/DL (ref 0–100)
CONV TOTAL PROTEIN: 6.3 G/DL (ref 6.1–7.9)
CREAT UR-MCNC: 0.7 MG/DL (ref 0.4–1)
GLOBULIN UR ELPH-MCNC: 2.6 G/DL (ref 2.5–3.8)
GLUCOSE SERPL-MCNC: 88 MG/DL (ref 65–99)
HDLC SERPL-MCNC: 70 MG/DL
LDLC/HDLC SERPL: 1.2 {RATIO}
LIPID INTERPRETATION: NORMAL
MAGNESIUM SERPL-MCNC: 1.9 MG/DL (ref 1.8–2.5)
POTASSIUM SERPL-SCNC: 4 MMOL/L (ref 3.6–5.1)
SODIUM SERPL-SCNC: 138 MMOL/L (ref 136–144)
TRIGL SERPL-MCNC: 107 MG/DL
VLDLC SERPL CALC-MCNC: 13.4 MG/DL

## 2019-03-19 ENCOUNTER — OFFICE (AMBULATORY)
Dept: URBAN - METROPOLITAN AREA CLINIC 64 | Facility: CLINIC | Age: 79
End: 2019-03-19

## 2019-03-19 VITALS
SYSTOLIC BLOOD PRESSURE: 119 MMHG | HEIGHT: 66 IN | WEIGHT: 194 LBS | DIASTOLIC BLOOD PRESSURE: 50 MMHG | HEART RATE: 62 BPM

## 2019-03-19 DIAGNOSIS — R05 COUGH: ICD-10-CM

## 2019-03-19 DIAGNOSIS — K21.9 GASTRO-ESOPHAGEAL REFLUX DISEASE WITHOUT ESOPHAGITIS: ICD-10-CM

## 2019-03-19 PROCEDURE — 99213 OFFICE O/P EST LOW 20 MIN: CPT | Performed by: NURSE PRACTITIONER

## 2019-06-14 ENCOUNTER — OFFICE VISIT (OUTPATIENT)
Dept: NEUROLOGY | Facility: CLINIC | Age: 79
End: 2019-06-14

## 2019-06-14 VITALS
BODY MASS INDEX: 29.57 KG/M2 | SYSTOLIC BLOOD PRESSURE: 119 MMHG | WEIGHT: 184 LBS | HEIGHT: 66 IN | DIASTOLIC BLOOD PRESSURE: 64 MMHG

## 2019-06-14 DIAGNOSIS — G43.109 MIGRAINE WITH AURA AND WITHOUT STATUS MIGRAINOSUS, NOT INTRACTABLE: ICD-10-CM

## 2019-06-14 DIAGNOSIS — G45.9 TRANSIENT CEREBRAL ISCHEMIA, UNSPECIFIED TYPE: Primary | ICD-10-CM

## 2019-06-14 PROCEDURE — 99213 OFFICE O/P EST LOW 20 MIN: CPT | Performed by: PSYCHIATRY & NEUROLOGY

## 2019-06-14 RX ORDER — TOPIRAMATE 50 MG/1
50 TABLET, FILM COATED ORAL DAILY
Qty: 90 TABLET | Refills: 3 | Status: SHIPPED | OUTPATIENT
Start: 2019-06-14 | End: 2019-09-06 | Stop reason: SDUPTHER

## 2019-06-14 RX ORDER — TOPIRAMATE 50 MG/1
50 TABLET, FILM COATED ORAL DAILY
COMMUNITY
End: 2019-06-14 | Stop reason: SDUPTHER

## 2019-06-14 RX ORDER — PANTOPRAZOLE SODIUM 40 MG/1
40 TABLET, DELAYED RELEASE ORAL 2 TIMES DAILY
COMMUNITY
End: 2020-01-07

## 2019-06-14 NOTE — PROGRESS NOTES
Subjective:     Patient ID: Brooke Molina is a 78 y.o. female.    History of Present Illness    The patient has had headaches intermittently at times.  No strokelike symptoms.  She did have a cluster of headaches about 2 weeks ago.  Otherwise she has been doing fairly well.  She is on topiramate 50 mg at night without side effects.  No more amnestic episodes.  The following portions of the patient's history were reviewed and updated as appropriate: allergies, current medications, past family history, past medical history, past social history, past surgical history and problem list.      Current Outpatient Medications:   •  aspirin 81 MG chewable tablet, Chew 81 mg daily., Disp: , Rfl:   •  cholecalciferol (VITAMIN D3) 1000 UNITS tablet, Take 1,000 Units by mouth daily., Disp: , Rfl:   •  FLUZONE HIGH-DOSE 0.5 ML suspension prefilled syringe injection, INJECT 0.5 ML INTRAMUSCULARLY ONE TIME ONLY, Disp: , Rfl: 0  •  metoprolol tartrate (LOPRESSOR) 50 MG tablet, Take 50 mg by mouth 2 (two) times a day., Disp: , Rfl:   •  Multiple Vitamin (MULTI VITAMIN DAILY PO), Take  by mouth., Disp: , Rfl:   •  Omega-3 Fatty Acids (FISH OIL) 1000 MG capsule capsule, Take  by mouth Daily With Breakfast., Disp: , Rfl:   •  pantoprazole (PROTONIX) 40 MG EC tablet, Take 40 mg by mouth 2 (Two) Times a Day., Disp: , Rfl:   •  PNEUMOVAX 23 25 MCG/0.5ML vaccine, INJECT 0.5 ML INTRAMUSCULARLY ONE TIME ONLY, Disp: , Rfl: 0  •  sertraline (ZOLOFT) 50 MG tablet, Take 50 mg by mouth Daily., Disp: , Rfl:   •  topiramate (TOPAMAX) 50 MG tablet, Take 1 tablet by mouth Daily., Disp: 90 tablet, Rfl: 3  •  vitamin B-12 (CYANOCOBALAMIN) 500 MCG tablet, Take 500 mcg by mouth daily., Disp: , Rfl:   •  atorvastatin (LIPITOR) 20 MG tablet, Take 20 mg by mouth 1 (one) time daily., Disp: , Rfl:     Review of Systems   Constitutional: Negative.    Neurological: Positive for headaches. Negative for dizziness, tremors, seizures, syncope, facial asymmetry,  speech difficulty, weakness, light-headedness and numbness.   Psychiatric/Behavioral: Negative.           I have reviewed ROS completed by medical assistant.     Objective:    Neurologic Exam  Mental status examination was appropriate.  Funduscopy, visual fields, eye movements and pupillary reflexes were normal.  No facial weakness was noted.  Gait was normal.  No pattern of focal weakness was noted.  Physical Exam    Assessment/Plan:     Brooke was seen today for migraine.    Diagnoses and all orders for this visit:    Transient cerebral ischemia, unspecified type    Migraine with aura and without status migrainosus, not intractable    Other orders  -     topiramate (TOPAMAX) 50 MG tablet; Take 1 tablet by mouth Daily.         Alida alternatives for care.  I do not feel she is a candidate for triptan.  Prescription drug management - meds as above    Follow-up in the office in one year. Thank you for allowing me to share in the care of this patient.  Delvin Reed M.D.

## 2019-06-18 ENCOUNTER — TRANSCRIBE ORDERS (OUTPATIENT)
Dept: PHYSICAL THERAPY | Facility: CLINIC | Age: 79
End: 2019-06-18

## 2019-06-18 DIAGNOSIS — M51.36 DEGENERATION OF LUMBAR INTERVERTEBRAL DISC: Primary | ICD-10-CM

## 2019-06-18 DIAGNOSIS — M54.16 LUMBAR RADICULOPATHY: ICD-10-CM

## 2019-06-19 ENCOUNTER — OFFICE VISIT (OUTPATIENT)
Dept: FAMILY MEDICINE CLINIC | Facility: CLINIC | Age: 79
End: 2019-06-19

## 2019-06-19 VITALS
WEIGHT: 187.6 LBS | RESPIRATION RATE: 16 BRPM | OXYGEN SATURATION: 95 % | BODY MASS INDEX: 30.15 KG/M2 | TEMPERATURE: 98 F | HEIGHT: 66 IN | SYSTOLIC BLOOD PRESSURE: 108 MMHG | HEART RATE: 70 BPM | DIASTOLIC BLOOD PRESSURE: 54 MMHG

## 2019-06-19 DIAGNOSIS — M51.37 DDD (DEGENERATIVE DISC DISEASE), LUMBOSACRAL: ICD-10-CM

## 2019-06-19 DIAGNOSIS — E78.01 FAMILIAL HYPERCHOLESTEROLEMIA: ICD-10-CM

## 2019-06-19 DIAGNOSIS — M17.11 PRIMARY OSTEOARTHRITIS OF RIGHT KNEE: ICD-10-CM

## 2019-06-19 DIAGNOSIS — K21.9 GASTROESOPHAGEAL REFLUX DISEASE WITHOUT ESOPHAGITIS: Primary | ICD-10-CM

## 2019-06-19 PROBLEM — F43.21 SITUATIONAL DEPRESSION: Status: ACTIVE | Noted: 2019-06-19

## 2019-06-19 PROBLEM — M51.379 DDD (DEGENERATIVE DISC DISEASE), LUMBOSACRAL: Status: ACTIVE | Noted: 2019-06-19

## 2019-06-19 PROBLEM — E78.5 HYPERLIPIDEMIA LDL GOAL <130: Status: ACTIVE | Noted: 2019-06-19

## 2019-06-19 PROBLEM — E78.5 HYPERLIPIDEMIA: Status: ACTIVE | Noted: 2019-06-19

## 2019-06-19 PROBLEM — I42.9 CARDIOMYOPATHY (HCC): Status: ACTIVE | Noted: 2019-06-19

## 2019-06-19 PROBLEM — M17.9 DJD (DEGENERATIVE JOINT DISEASE) OF KNEE: Status: ACTIVE | Noted: 2019-06-19

## 2019-06-19 PROBLEM — J38.5 LARYNGEAL SPASM: Status: ACTIVE | Noted: 2019-06-19

## 2019-06-19 PROBLEM — M19.90 OSTEOARTHRITIS: Status: ACTIVE | Noted: 2019-02-20

## 2019-06-19 PROBLEM — I38 VALVULAR HEART DISEASE: Status: ACTIVE | Noted: 2019-06-19

## 2019-06-19 PROBLEM — I10 HYPERTENSION: Status: ACTIVE | Noted: 2019-06-19

## 2019-06-19 PROBLEM — I35.1 AORTIC INSUFFICIENCY: Status: ACTIVE | Noted: 2019-06-19

## 2019-06-19 LAB
ALBUMIN SERPL-MCNC: 4.1 G/DL (ref 3.5–4.8)
ALBUMIN/GLOB SERPL: 1.7 G/DL (ref 1–1.7)
ALP SERPL-CCNC: 48 U/L (ref 32–91)
ALT SERPL W P-5'-P-CCNC: 23 U/L (ref 14–54)
ANION GAP SERPL CALCULATED.3IONS-SCNC: 9 MMOL/L (ref 10–20)
ARTICHOKE IGE QN: 164 MG/DL (ref 0–100)
AST SERPL-CCNC: 22 U/L (ref 15–41)
BILIRUB SERPL-MCNC: 0.7 MG/DL (ref 0.3–1.2)
BUN BLD-MCNC: 20 MG/DL (ref 8–20)
BUN/CREAT SERPL: 28.6 (ref 5.4–26.2)
CALCIUM SPEC-SCNC: 9.8 MG/DL (ref 8.9–10.3)
CHLORIDE SERPL-SCNC: 103 MMOL/L (ref 101–111)
CHOLEST SERPL-MCNC: 242 MG/DL
CO2 SERPL-SCNC: 25 MMOL/L (ref 22–32)
CREAT BLD-MCNC: 0.7 MG/DL (ref 0.4–1)
GFR SERPL CREATININE-BSD FRML MDRD: 81 ML/MIN/1.73
GLOBULIN UR ELPH-MCNC: 2.4 GM/DL (ref 2.5–3.8)
GLUCOSE BLD-MCNC: 105 MG/DL (ref 65–99)
HDLC SERPL QL: 3.41
HDLC SERPL-MCNC: 71 MG/DL
LDLC/HDLC SERPL: 2.23 {RATIO}
POTASSIUM BLD-SCNC: 4.3 MMOL/L (ref 3.6–5.1)
PROT SERPL-MCNC: 6.5 G/DL (ref 6.1–7.9)
SODIUM BLD-SCNC: 137 MMOL/L (ref 136–144)
TRIGL SERPL-MCNC: 62 MG/DL
VLDLC SERPL-MCNC: 12.4 MG/DL

## 2019-06-19 PROCEDURE — 36415 COLL VENOUS BLD VENIPUNCTURE: CPT | Performed by: INTERNAL MEDICINE

## 2019-06-19 PROCEDURE — 80061 LIPID PANEL: CPT | Performed by: INTERNAL MEDICINE

## 2019-06-19 PROCEDURE — 99214 OFFICE O/P EST MOD 30 MIN: CPT | Performed by: INTERNAL MEDICINE

## 2019-06-19 PROCEDURE — 80053 COMPREHEN METABOLIC PANEL: CPT | Performed by: INTERNAL MEDICINE

## 2019-06-19 RX ORDER — ROSUVASTATIN CALCIUM 5 MG/1
5 TABLET, COATED ORAL DAILY
Qty: 30 TABLET | Refills: 5 | Status: SHIPPED | OUTPATIENT
Start: 2019-06-19 | End: 2019-07-18 | Stop reason: SDUPTHER

## 2019-06-19 RX ORDER — METOPROLOL TARTRATE 50 MG/1
TABLET, FILM COATED ORAL
Qty: 180 TABLET | Refills: 1 | Status: SHIPPED | OUTPATIENT
Start: 2019-06-19 | End: 2019-12-30

## 2019-06-19 RX ORDER — MELOXICAM 15 MG/1
TABLET ORAL
COMMUNITY
Start: 2019-06-18 | End: 2019-10-31

## 2019-06-19 NOTE — PROGRESS NOTES
"Rooming Tab(CC,VS,Pt Hx,Fall Screen)  Chief Complaint   Patient presents with   • Hyperlipidemia       Subjective   Pt here for several reasons  1. Needs to get cholesterol checked- went off the medication  - the pain in legs may have been better- but thinks some from back to- had to quit tennis  2. Read article and worried that on 2 of PPI daily and could be causing issues with her kidneys and afraid to take 2/day but the cough has relieved and doesn't want the larnygneal reflux back  3. Seen Ortho- had xray hip and spine and showed DDD- feels weak in legs and  To get PT set up- unsure of walking is good or bad.  Long discussion about the weakness and need for muscle strengthening- no radicular pain. definitely a postural effect. Does not walk on beach  I have reviewed and updated her medications, medical history and problem list during today's office visit.     Patient Care Team:  Provider, No Known as PCP - Seema Gutierrez MD as PCP - Claims Attributed    Problem List Tab  Medications Tab  Synopsis Tab  Chart Review Tab  Care Everywhere Tab  Immunizations Tab  Patient History Tab    Social History     Tobacco Use   • Smoking status: Never Smoker   • Smokeless tobacco: Never Used   Substance Use Topics   • Alcohol use: Yes     Comment: Social       Review of Systems   Constitutional: Negative for fatigue.   Genitourinary: Negative for hematuria.   Musculoskeletal: Positive for back pain.   Neurological: Positive for weakness. Negative for dizziness and confusion.       Objective     Rooming Tab(CC,VS,Pt Hx,Fall Screen)  /54 (BP Location: Left arm, Patient Position: Sitting)   Pulse 70   Temp 98 °F (36.7 °C) (Oral)   Resp 16   Ht 167.6 cm (66\")   Wt 85.1 kg (187 lb 9.6 oz)   SpO2 95%   BMI 30.28 kg/m²     Body mass index is 30.28 kg/m².    Physical Exam   Constitutional: She appears well-developed and well-nourished.   HENT:   Head: Normocephalic and atraumatic.   Neck: Normal range of " motion.   Cardiovascular: Normal rate, regular rhythm and normal heart sounds.   Pulmonary/Chest: Effort normal and breath sounds normal.   Abdominal: Soft. Bowel sounds are normal. She exhibits no distension.   Musculoskeletal: She exhibits tenderness and deformity.   OA in lower back and knees   Neurological: She displays normal reflexes. Coordination normal.   Nursing note and vitals reviewed.       Statin Choice Calculator  Data Reviewed:                   Assessment/Plan   Order Review Tab  Health Maintenance Tab  Patient Plan/Order Tab  Diagnoses and all orders for this visit:    1. Gastroesophageal reflux disease without esophagitis (Primary)  Assessment & Plan:  Will try to decrease PPI to 1/day      2. DDD (degenerative disc disease), lumbosacral  Comments:  has PT already scheduled    3. Primary osteoarthritis of right knee    4. Familial hypercholesterolemia  Comments:   went off meds because of msucle pains- here to get rechecked  Orders:  -     Comprehensive Metabolic Panel  -     Lipid Panel      Wrapup Tab  Return in about 6 months (around 12/19/2019), or if symptoms worsen or fail to improve, for Recheck, Next scheduled follow up.

## 2019-06-19 NOTE — PROGRESS NOTES
Please tell pt that cholesterol increased LDL from 82 to 164- will need to restart cholesterol medication- Rx sent to pharmacy

## 2019-06-19 NOTE — PATIENT INSTRUCTIONS
Will call patient if needs to restart the  Statins   to start PT through ortho next week after  Vacation   to wear shoes always on beach in [place]- get inserts   will try to wean down PPI to every other day 1/day

## 2019-07-01 ENCOUNTER — OFFICE VISIT (OUTPATIENT)
Dept: PHYSICAL THERAPY | Facility: CLINIC | Age: 79
End: 2019-07-01

## 2019-07-01 DIAGNOSIS — G89.29 CHRONIC LEFT-SIDED LOW BACK PAIN WITH LEFT-SIDED SCIATICA: Primary | ICD-10-CM

## 2019-07-01 DIAGNOSIS — M54.42 CHRONIC LEFT-SIDED LOW BACK PAIN WITH LEFT-SIDED SCIATICA: Primary | ICD-10-CM

## 2019-07-01 PROCEDURE — 97110 THERAPEUTIC EXERCISES: CPT | Performed by: PHYSICAL THERAPIST

## 2019-07-01 PROCEDURE — 97162 PT EVAL MOD COMPLEX 30 MIN: CPT | Performed by: PHYSICAL THERAPIST

## 2019-07-01 NOTE — PROGRESS NOTES
"  Physical Therapy Initial Evaluation and Plan of Care        Patient: Brooke Molina   : 1940  Diagnosis/ICD-10 Code:  Chronic left-sided low back pain with left-sided sciatica [M54.42, G89.29]  Referring practitioner: Ridge Bear MD  Date of Initial Visit: 2019  Today's Date: 2019  Patient seen for 1 sessions           Subjective Questionnaire: Oswestry: 18%      Subjective Evaluation    History of Present Illness  Date of onset: 2018  Mechanism of injury: Patient presents to physical therapy with chief complaint of pain/weakness in LLE and occasional lower back pain that has been present for the past 9 months.  Patient reports that recently she has had to stop playing tennis due to her left knee beginning to \"give out\".  Patient also reports recent onset of left hip and lower back pain.  Patient's main concern at this time is falling.  Patient recently had x-ray and MRI on left knee and lower back, both finding arthritis.  Patient is retired, however wishes to maintain active lifestyle.      Quality of life: excellent    Pain  Current pain ratin  At worst pain rating: 3  Quality: burning  Relieving factors: ice, heat and medications  Aggravating factors: prolonged positioning, lifting, movement, standing, squatting and stairs  Progression: improved    Diagnostic Tests  X-ray: abnormal  MRI studies: abnormal    Patient Goals  Patient goals for therapy: decreased pain, improved balance, increased strength, independence with ADLs/IADLs and return to sport/leisure activities             Objective       Tenderness     Left Hip   Tenderness in the PSIS.     Active Range of Motion     Lumbar   Flexion: WFL  Extension: with pain  Left lateral flexion: WFL  Right lateral flexion: WFL  Left rotation: WFL  Right rotation: WFL    Strength/Myotome Testing     Left Hip   Planes of Motion   Flexion: 5  Extension: 5  Abduction: 5  Adduction: 5  External rotation: 5  Internal rotation: " 4    Right Hip   Planes of Motion   Flexion: 5  Extension: 5  Abduction: 5  Adduction: 5  External rotation: 5  Internal rotation: 5    Left Knee   Flexion: 4  Extension: 5    Right Knee   Flexion: 5  Extension: 5    Left Ankle/Foot   Dorsiflexion: 4+    Right Ankle/Foot   Dorsiflexion: 4+    Tests     Lumbar     Left   Positive quadrant.   Negative crossed SLR and passive SLR.     Right   Negative crossed SLR, passive SLR and quadrant.          Assessment & Plan     Assessment  Impairments: abnormal or restricted ROM, impaired physical strength, lacks appropriate home exercise program and pain with function  Assessment details: Patient presents to physical therapy with s/s congruent with MD diagnosis of lumbar radiculopathy.  Patient demonstrates weakness in LLE, pain in lumbar spine with movement/palpation, and edema noted in left knee.  Patient reports relief of symptoms with positional distraction.  Patient also demonstrates proper technique with newly added lumbar stabilization exercise.  Patient appropriate for physical therapy intervention in order to address these deficits so that she may return to ADL's without pain or limitation.   Prognosis: good  Functional Limitations: lifting, walking, pulling, sitting and standing  Goals  Plan Goals: In two weeks, patient will demonstrates at least 50% improvement in lumbar AROM.   In two weeks, patient will report at least 25% reduction in pain.     In four weeks, patient will demonstrate 5/5 muscular strength in BLE's.   In four weeks, patient will demonstrate full lumbar AROM.   In four weeks, patient will demonstrate decreased perceived disability by decreasing score on revised oswestry by at least 8 points (18% on eval).     Plan  Therapy options: will be seen for skilled physical therapy services  Planned modality interventions: thermotherapy (hydrocollator packs), traction, electrical stimulation/Russian stimulation and cryotherapy  Planned therapy  interventions: home exercise program, joint mobilization, soft tissue mobilization, neuromuscular re-education, manual therapy, spinal/joint mobilization, strengthening and stretching  Frequency: 2x week  Duration in visits: 12  Plan details: Progress with modalities/manual therapy to reduce pain and improve AROM.  Progress with strengthening as symptoms decrease.         Manual Therapy:         mins  50693;  Therapeutic Exercise:    30     mins  72901;     Neuromuscular Leeroy:        mins  20625;    Therapeutic Activity:          mins  09004;     Gait Training:           mins  04055;     Ultrasound:          mins  49403;    Electrical Stimulation:         mins  60526 ( );  Dry Needling          mins self-pay    Timed Treatment:   30   mins   Total Treatment:     55   mins    PT SIGNATURE: Abraham Shultz PT   DATE TREATMENT INITIATED: 7/1/2019    Initial Certification  Certification Period: 9/29/2019  I certify that the therapy services are furnished while this patient is under my care.  The services outlined above are required by this patient, and will be reviewed every 90 days.     PHYSICIAN: Ridge Bear MD      DATE:     Please sign and return via fax to [phone].. Thank you, [facility].

## 2019-07-08 ENCOUNTER — OFFICE VISIT (OUTPATIENT)
Dept: PHYSICAL THERAPY | Facility: CLINIC | Age: 79
End: 2019-07-08

## 2019-07-08 DIAGNOSIS — M54.42 CHRONIC LEFT-SIDED LOW BACK PAIN WITH LEFT-SIDED SCIATICA: Primary | ICD-10-CM

## 2019-07-08 DIAGNOSIS — G89.29 CHRONIC LEFT-SIDED LOW BACK PAIN WITH LEFT-SIDED SCIATICA: Primary | ICD-10-CM

## 2019-07-08 PROCEDURE — 97110 THERAPEUTIC EXERCISES: CPT | Performed by: PHYSICAL THERAPIST

## 2019-07-08 NOTE — PROGRESS NOTES
Physical Therapy Daily Progress Note        Patient: Brooke Molina   : 1940  Diagnosis/ICD-10 Code:  Chronic left-sided low back pain with left-sided sciatica [M54.42, G89.29]  Referring practitioner: Ridge Bear MD  Date of Initial Visit: Type: THERAPY  Noted: 2019  Today's Date: 2019           Brooke Molina reports:  Continued stiffness in lumbar spine and LLE.  Reports completing positional distraction, however that she may have went over 10 minute time limit.         Objective   See Exercise, Manual, and Modality Logs for complete treatment.       Assessment/Plan     Patient demonstrates proper technique with home exercises.  Tolerates exercise without increased pain in lumbar spine or LLE.  Patient educated to continue with positional distraction, however to abide by 10 minute time limit.     Progress per Plan of Care           Manual Therapy:         mins  40518;  Therapeutic Exercise:    25     mins  15674;     Neuromuscular Leeroy:        mins  95761;    Therapeutic Activity:          mins  49502;     Gait Training:           mins  83194;     Ultrasound:          mins  13480;    Electrical Stimulation:         mins  48956 ( );  Dry Needling          mins self-pay    Timed Treatment:   25   mins   Total Treatment:     25   mins    Abraham Shultz PT  Physical Therapist

## 2019-07-15 ENCOUNTER — OFFICE VISIT (OUTPATIENT)
Dept: PHYSICAL THERAPY | Facility: CLINIC | Age: 79
End: 2019-07-15

## 2019-07-15 DIAGNOSIS — M54.42 CHRONIC LEFT-SIDED LOW BACK PAIN WITH LEFT-SIDED SCIATICA: Primary | ICD-10-CM

## 2019-07-15 DIAGNOSIS — G89.29 CHRONIC LEFT-SIDED LOW BACK PAIN WITH LEFT-SIDED SCIATICA: Primary | ICD-10-CM

## 2019-07-15 PROCEDURE — 97110 THERAPEUTIC EXERCISES: CPT | Performed by: PHYSICAL THERAPIST

## 2019-07-15 PROCEDURE — 97012 MECHANICAL TRACTION THERAPY: CPT | Performed by: PHYSICAL THERAPIST

## 2019-07-15 NOTE — PROGRESS NOTES
"   Physical Therapy Daily Progress Note        Patient: Brooke Molina   : 1940  Diagnosis/ICD-10 Code:  Chronic left-sided low back pain with left-sided sciatica [M54.42, G89.29]  Referring practitioner: Ridge Bear MD  Date of Initial Visit: Type: THERAPY  Noted: 2019  Today's Date: 7/15/2019  Patient seen for 3 sessions         Brooke Molina reports:  Patient reports continued pain in bilateral hips and occasional weakness in left knee.  Reports that she feels left knee is \"giving out less\".       Objective   See Exercise, Manual, and Modality Logs for complete treatment.       Assessment/Plan    Patient reports feeling well after lumbar mechanical traction.  Patient tolerates today's exercises well.     Progress per Plan of Care           Manual Therapy:         mins  09188;  Therapeutic Exercise:    10     mins  92196;     Neuromuscular Leeroy:        mins  89116;    Therapeutic Activity:          mins  93559;     Gait Training:           mins  45331;     Ultrasound:          mins  57170;    Electrical Stimulation:         mins  21111 ( );  Dry Needling          mins self-pay  Traction                         15 mins  84975    Timed Treatment:   10   mins   Total Treatment:     25   mins    Abraham Shultz PT  Physical Therapist                    "

## 2019-07-18 RX ORDER — ROSUVASTATIN CALCIUM 5 MG/1
TABLET, COATED ORAL
Qty: 90 TABLET | Refills: 4 | Status: SHIPPED | OUTPATIENT
Start: 2019-07-18 | End: 2020-01-07

## 2019-08-29 ENCOUNTER — HOSPITAL ENCOUNTER (OUTPATIENT)
Dept: CT IMAGING | Facility: HOSPITAL | Age: 79
End: 2019-08-29

## 2019-08-29 ENCOUNTER — TELEPHONE (OUTPATIENT)
Dept: URGENT CARE | Facility: CLINIC | Age: 79
End: 2019-08-29

## 2019-08-29 ENCOUNTER — HOSPITAL ENCOUNTER (OUTPATIENT)
Dept: CT IMAGING | Facility: HOSPITAL | Age: 79
Discharge: HOME | End: 2019-08-29
Admitting: INTERNAL MEDICINE

## 2019-08-29 ENCOUNTER — OFFICE VISIT (OUTPATIENT)
Dept: FAMILY MEDICINE CLINIC | Facility: CLINIC | Age: 79
End: 2019-08-29

## 2019-08-29 VITALS
TEMPERATURE: 98.8 F | OXYGEN SATURATION: 95 % | SYSTOLIC BLOOD PRESSURE: 130 MMHG | DIASTOLIC BLOOD PRESSURE: 106 MMHG | HEIGHT: 66 IN | HEART RATE: 65 BPM | RESPIRATION RATE: 16 BRPM | BODY MASS INDEX: 29.8 KG/M2 | WEIGHT: 185.4 LBS

## 2019-08-29 DIAGNOSIS — E55.9 VITAMIN D DEFICIENCY: ICD-10-CM

## 2019-08-29 DIAGNOSIS — I10 ESSENTIAL HYPERTENSION: ICD-10-CM

## 2019-08-29 DIAGNOSIS — R51.9 NONINTRACTABLE HEADACHE, UNSPECIFIED CHRONICITY PATTERN, UNSPECIFIED HEADACHE TYPE: Primary | ICD-10-CM

## 2019-08-29 DIAGNOSIS — I38 VALVULAR HEART DISEASE: ICD-10-CM

## 2019-08-29 LAB
ALBUMIN SERPL-MCNC: 4.2 G/DL (ref 3.5–4.8)
ALBUMIN/GLOB SERPL: 1.5 G/DL (ref 1–1.7)
ALP SERPL-CCNC: 45 U/L (ref 32–91)
ALT SERPL W P-5'-P-CCNC: 23 U/L (ref 14–54)
ANION GAP SERPL CALCULATED.3IONS-SCNC: 16.3 MMOL/L (ref 5–15)
AST SERPL-CCNC: 22 U/L (ref 15–41)
BILIRUB SERPL-MCNC: 0.4 MG/DL (ref 0.3–1.2)
BUN BLD-MCNC: 16 MG/DL (ref 8–20)
BUN/CREAT SERPL: 22.9 (ref 5.4–26.2)
CALCIUM SPEC-SCNC: 9.6 MG/DL (ref 8.9–10.3)
CHLORIDE SERPL-SCNC: 106 MMOL/L (ref 101–111)
CO2 SERPL-SCNC: 24 MMOL/L (ref 22–32)
CREAT BLD-MCNC: 0.7 MG/DL (ref 0.4–1)
CRP SERPL-MCNC: 0.05 MG/DL (ref 0–0.7)
ERYTHROCYTE [SEDIMENTATION RATE] IN BLOOD: 10 MM/HR (ref 0–30)
GFR SERPL CREATININE-BSD FRML MDRD: 81 ML/MIN/1.73
GLOBULIN UR ELPH-MCNC: 2.8 GM/DL (ref 2.5–3.8)
GLUCOSE BLD-MCNC: 90 MG/DL (ref 65–99)
POTASSIUM BLD-SCNC: 4.3 MMOL/L (ref 3.6–5.1)
PROT SERPL-MCNC: 7 G/DL (ref 6.1–7.9)
SODIUM BLD-SCNC: 142 MMOL/L (ref 136–144)

## 2019-08-29 PROCEDURE — 80053 COMPREHEN METABOLIC PANEL: CPT | Performed by: INTERNAL MEDICINE

## 2019-08-29 PROCEDURE — 70450 CT HEAD/BRAIN W/O DYE: CPT

## 2019-08-29 PROCEDURE — 85652 RBC SED RATE AUTOMATED: CPT | Performed by: INTERNAL MEDICINE

## 2019-08-29 PROCEDURE — 99214 OFFICE O/P EST MOD 30 MIN: CPT | Performed by: INTERNAL MEDICINE

## 2019-08-29 PROCEDURE — 86140 C-REACTIVE PROTEIN: CPT | Performed by: INTERNAL MEDICINE

## 2019-08-29 NOTE — ASSESSMENT & PLAN NOTE
Suspect tension like HA exacerbated by dehydration. Encouraged increased fluid intake. history is atypical for giant cell temporal arteritis but will check CRP and ESR. No evidence of trigeminal neuralgia. HA is improved in recumbent position, no evidence of papilledema on fundoscopic exam today, and negative head CT rule down intracranial mass or bleed. Will check CMP today because of history of nausea and consider brain MRI and ophthalmology if symptoms do not improve.

## 2019-08-29 NOTE — PROGRESS NOTES
Rooming Tab(CC,VS,Pt Hx,Fall Screen)  Chief Complaint   Patient presents with   • Nausea   • Headache       Subjective      Pt reports developing a frontal HA and nausea about a week ago associated with fatigue. Intermittent HA was better at night, dull ache, lasts <1hr, 3-5/10, and 5-6x/day.Takes advil if HA is bad about once a day. Denies visual disturbance or jaw claudication. Denies sudden, unilateral, severe, brief, stabbing or lancinating, recurrent episodes of pain Thought symptoms were improving but yesterday afternoon during lunch had worsening of HA and she went to urgent care. Optic disc swelling was reportedly noted and pt got head CT this AM. Head CT was normal. States that she still has intermittent nausea but denies emesis. Took 1 zofran today. Reports baseline cough from silent reflux. But reports 2 day h/o worsening dry cough. Denies wheezing or dyspnea. No triggers identified. Nausea is slightly better with activity. Reports diarrhea for the past 2 days (3-4 episodes/day).     I have reviewed and updated her medications, medical history and problem list during today's office visit.     Patient Care Team:  Seema Connell MD as PCP - General (Internal Medicine)  Seema Connell MD as PCP - Claims Attributed    Problem List Tab  Medications Tab  Synopsis Tab  Chart Review Tab  Care Everywhere Tab  Immunizations Tab  Patient History Tab    Social History     Tobacco Use   • Smoking status: Never Smoker   • Smokeless tobacco: Never Used   Substance Use Topics   • Alcohol use: Yes     Comment: Social       Review of Systems   Constitutional: Positive for fatigue. Negative for fever, unexpected weight gain and unexpected weight loss.   HENT: Negative for ear pain, sneezing, sore throat and swollen glands.    Eyes: Negative for visual disturbance.   Respiratory: Positive for cough. Negative for chest tightness and wheezing.    Cardiovascular: Negative for chest pain, palpitations and leg  "swelling.   Gastrointestinal: Positive for diarrhea and nausea. Negative for abdominal pain, blood in stool, constipation and vomiting.   Genitourinary: Negative for dysuria and hematuria.   Musculoskeletal: Negative for joint swelling and myalgias.   Skin: Negative for rash.   Neurological: Positive for headache. Negative for tremors, seizures and weakness.   Hematological: Negative for adenopathy.       Objective     Rooming Tab(CC,VS,Pt Hx,Fall Screen)  BP (!) 130/106 (BP Location: Left arm, Patient Position: Sitting, Cuff Size: Adult)   Pulse 65   Temp 98.8 °F (37.1 °C) (Oral)   Resp 16   Ht 167.6 cm (66\")   Wt 84.1 kg (185 lb 6.4 oz)   SpO2 95%   BMI 29.92 kg/m²     Body mass index is 29.92 kg/m².    Physical Exam   Constitutional: She appears well-developed and well-nourished. No distress.   HENT:   Head: Normocephalic.   Right Ear: External ear normal.   Left Ear: External ear normal.   Mouth/Throat: Oropharynx is clear and moist. No oropharyngeal exudate.   Eyes: Conjunctivae are normal. Pupils are equal, round, and reactive to light. Right eye exhibits no discharge. Left eye exhibits no discharge. No scleral icterus.   Neck: Normal range of motion.   Cardiovascular: Normal rate and regular rhythm.   No murmur heard.  Pulmonary/Chest: Effort normal and breath sounds normal. No respiratory distress.   Abdominal: Soft. She exhibits no distension. There is no tenderness. There is no guarding.   Musculoskeletal: Normal range of motion. She exhibits no edema.   Lymphadenopathy:     She has no cervical adenopathy.   Neurological: She is alert. No cranial nerve deficit. Coordination normal.   Skin: She is not diaphoretic.   Psychiatric: She has a normal mood and affect. Her behavior is normal. Judgment and thought content normal.       Ct Head Without Contrast    Result Date: 8/29/2019  Impression: No acute intracranial abnormality. No change from previous examination. Brain MRI is more sensitive to evaluate " for acute or subacute infarcts and to evaluate for intracranial metastatic disease.  Electronically Signed By-Bailey Lane On:8/29/2019 10:11 AM This report was finalized on [id number] by  Bailey Lane, .        Lab Results   Component Value Date    BUN 20 06/19/2019    CREATININE 0.70 06/19/2019    EGFRIFNONA 81 06/19/2019     06/19/2019    K 4.3 06/19/2019     06/19/2019    CALCIUM 9.8 06/19/2019    ALBUMIN 4.10 06/19/2019    BILITOT 0.7 06/19/2019    ALKPHOS 48 06/19/2019    AST 22 06/19/2019    ALT 23 06/19/2019    TRIG 62 06/19/2019    HDL 71 06/19/2019    VLDL 12.4 06/19/2019     (H) 06/19/2019    LDLHDL 2.23 06/19/2019      Assessment/Plan   Order Review Tab  Health Maintenance Tab  Patient Plan/Order Tab  Diagnoses and all orders for this visit:    1. Nonintractable headache, unspecified chronicity pattern, unspecified headache type (Primary)  Assessment & Plan:  Suspect tension like HA exacerbated by dehydration. Encouraged increased fluid intake. history is atypical for giant cell temporal arteritis but will check CRP and ESR. No evidence of trigeminal neuralgia. HA is improved in recumbent position, no evidence of papilledema on fundoscopic exam today, and negative head CT rule down intracranial mass or bleed. Will check CMP today because of history of nausea and consider brain MRI and ophthalmology if symptoms do not improve.     Orders:  -     C-reactive Protein  -     Sedimentation Rate  -     Comprehensive Metabolic Panel    Other orders  -     Cancel: Ova & Parasite Examination - Stool, Per Rectum; Future  -     Cancel: Stool Culture - Stool, Per Rectum; Future  -     Cancel: Clostridium Difficile Toxin, PCR - Stool, Per Rectum; Future    ABOVE Stool studies were cancelled since they were intended for another patient.   Wrapup Tab  No Follow-up on file.

## 2019-09-05 ENCOUNTER — OFFICE VISIT (OUTPATIENT)
Dept: FAMILY MEDICINE CLINIC | Facility: CLINIC | Age: 79
End: 2019-09-05

## 2019-09-05 VITALS
SYSTOLIC BLOOD PRESSURE: 126 MMHG | BODY MASS INDEX: 30.05 KG/M2 | RESPIRATION RATE: 16 BRPM | HEART RATE: 73 BPM | OXYGEN SATURATION: 95 % | TEMPERATURE: 98.3 F | DIASTOLIC BLOOD PRESSURE: 74 MMHG | WEIGHT: 186.2 LBS

## 2019-09-05 DIAGNOSIS — G43.C1 INTRACTABLE PERIODIC HEADACHE SYNDROME: ICD-10-CM

## 2019-09-05 DIAGNOSIS — I67.82 TEMPORARY CEREBRAL VASCULAR DYSFUNCTION: Primary | ICD-10-CM

## 2019-09-05 DIAGNOSIS — I10 ESSENTIAL HYPERTENSION: ICD-10-CM

## 2019-09-05 PROCEDURE — 99214 OFFICE O/P EST MOD 30 MIN: CPT | Performed by: INTERNAL MEDICINE

## 2019-09-05 NOTE — PROGRESS NOTES
Rooming Tab(CC,VS,Pt Hx,Fall Screen)  Chief Complaint   Patient presents with   • Headache   • Nausea       Subjective   Still getting severe headache with nausea- gets worse at at- not as severe and the initial intense- but does get bad   worrisome as ongoing. Not with eating, or movement. Worried about brain tumor or blockage awaking with intense headache in middle of night.  Tried to treat as migraine yesterday- helped for 6 hours. Blurred vision better, parsthesia improved. Unsteady gait at times- no falls- but very unsteady and more weak. Feels exhausted  Just moved in last few months and  couldn't do much so more stress on her   on protonix BID and helping that GERD.  I have reviewed and updated her medications, medical history and problem list during today's office visit.     Patient Care Team:  Seema Connell MD as PCP - General (Internal Medicine)  Seema Connell MD as PCP - Claims Attributed    Problem List Tab  Medications Tab  Synopsis Tab  Chart Review Tab  Care Everywhere Tab  Immunizations Tab  Patient History Tab    Social History     Tobacco Use   • Smoking status: Never Smoker   • Smokeless tobacco: Never Used   Substance Use Topics   • Alcohol use: Yes     Comment: Social       Review of Systems   Constitutional: Positive for activity change. Negative for unexpected weight gain.   Respiratory: Negative for wheezing.    Cardiovascular: Negative for chest pain.   Gastrointestinal: Negative for abdominal distention.   Neurological: Positive for weakness, light-headedness and headache.       Objective     Rooming Tab(CC,VS,Pt Hx,Fall Screen)  /74 (BP Location: Left arm, Patient Position: Sitting, Cuff Size: Adult)   Pulse 73   Temp 98.3 °F (36.8 °C) (Oral)   Resp 16   Wt 84.5 kg (186 lb 3.2 oz)   SpO2 95%   BMI 30.05 kg/m²     Body mass index is 30.05 kg/m².    Physical Exam   Constitutional: She is oriented to person, place, and time. She appears well-developed and  well-nourished.   HENT:   Head: Normocephalic and atraumatic.   Eyes: Pupils are equal, round, and reactive to light.   Neck: Normal range of motion.   Cardiovascular: Normal rate and regular rhythm.   No murmur heard.  Pulmonary/Chest: Breath sounds normal. She is in respiratory distress.   Abdominal: Soft. Bowel sounds are normal. She exhibits distension.   Neurological: She is oriented to person, place, and time.   Skin: Skin is warm. Capillary refill takes less than 2 seconds.   Nursing note and vitals reviewed.       Statin Choice Calculator  Data Reviewed:    Ct Head Without Contrast    Result Date: 8/29/2019  Impression: No acute intracranial abnormality. No change from previous examination. Brain MRI is more sensitive to evaluate for acute or subacute infarcts and to evaluate for intracranial metastatic disease.  Electronically Signed By-Bailey Lane On:8/29/2019 10:11 AM This report was finalized on [id number] by  Bailey Lane, .            Lab Results   Component Value Date    BUN 16 08/29/2019    CREATININE 0.70 08/29/2019    EGFRIFNONA 81 08/29/2019     08/29/2019    K 4.3 08/29/2019     08/29/2019    CALCIUM 9.6 08/29/2019    ALBUMIN 4.20 08/29/2019    BILITOT 0.4 08/29/2019    ALKPHOS 45 08/29/2019    AST 22 08/29/2019    ALT 23 08/29/2019    TRIG 62 06/19/2019    HDL 71 06/19/2019    VLDL 12.4 06/19/2019     (H) 06/19/2019    LDLHDL 2.23 06/19/2019      Assessment/Plan   Order Review Tab  Health Maintenance Tab  Patient Plan/Order Tab  Diagnoses and all orders for this visit:    1. Temporary cerebral vascular dysfunction (Primary)  -     MRI Brain With & Without Contrast; Future  -     MRI Angiogram Head Without Contrast; Future    2. Intractable periodic headache syndrome    3. Essential hypertension        Wrapup Tab  Return in about 4 weeks (around 10/3/2019).     needs scans at this point as severe headaches- not typical of migraine and worrisome for other pathology- keep neuro  appt

## 2019-09-06 ENCOUNTER — TELEPHONE (OUTPATIENT)
Dept: FAMILY MEDICINE CLINIC | Facility: CLINIC | Age: 79
End: 2019-09-06

## 2019-09-06 ENCOUNTER — OFFICE VISIT (OUTPATIENT)
Dept: NEUROLOGY | Facility: CLINIC | Age: 79
End: 2019-09-06

## 2019-09-06 VITALS
DIASTOLIC BLOOD PRESSURE: 72 MMHG | SYSTOLIC BLOOD PRESSURE: 128 MMHG | RESPIRATION RATE: 17 BRPM | OXYGEN SATURATION: 99 % | BODY MASS INDEX: 29.73 KG/M2 | HEIGHT: 66 IN | HEART RATE: 66 BPM | WEIGHT: 185 LBS

## 2019-09-06 DIAGNOSIS — G45.8 OTHER SPECIFIED TRANSIENT CEREBRAL ISCHEMIAS: ICD-10-CM

## 2019-09-06 DIAGNOSIS — G43.C1 INTRACTABLE PERIODIC HEADACHE SYNDROME: ICD-10-CM

## 2019-09-06 DIAGNOSIS — R51.9 NONINTRACTABLE EPISODIC HEADACHE, UNSPECIFIED HEADACHE TYPE: Primary | ICD-10-CM

## 2019-09-06 PROCEDURE — 99213 OFFICE O/P EST LOW 20 MIN: CPT | Performed by: PSYCHIATRY & NEUROLOGY

## 2019-09-06 RX ORDER — TOPIRAMATE 50 MG/1
50 TABLET, FILM COATED ORAL DAILY
Qty: 90 TABLET | Refills: 3 | Status: SHIPPED | OUTPATIENT
Start: 2019-09-06 | End: 2019-09-16 | Stop reason: SDUPTHER

## 2019-09-06 NOTE — PROGRESS NOTES
Subjective:     Patient ID: Brooke Molina is a 78 y.o. female.    History of Present Illness    Seen back in the office as she has been having increasing problems with headache and nausea over the past couple of weeks.  She has had a feeling of feeling light headed but no focal neurological symptoms no further episodes of amnesia.  The headaches tend to build up as the day goes on it is better if she takes Advil also the nausea improves with the use of 4 mg of Zofran.  She continues to be on topiramate 50 mg at night without side effects.  She had a CAT scan of the head done earlier this week that was unremarkable.  She is scheduled to have an MRI of the brain with and without contrast and MRA of the brain without contrast on Tuesday.  The following portions of the patient's history were reviewed and updated as appropriate: allergies, current medications, past family history, past medical history, past social history, past surgical history and problem list.      Current Outpatient Medications:   •  aspirin 81 MG chewable tablet, Chew 81 mg daily., Disp: , Rfl:   •  cholecalciferol (VITAMIN D3) 1000 UNITS tablet, Take 1,000 Units by mouth daily., Disp: , Rfl:   •  metoprolol tartrate (LOPRESSOR) 50 MG tablet, TAKE ONE TABLET BY MOUTH TWICE A DAY, Disp: 180 tablet, Rfl: 1  •  Multiple Vitamin (MULTI VITAMIN DAILY PO), Take  by mouth., Disp: , Rfl:   •  Omega-3 Fatty Acids (FISH OIL) 1000 MG capsule capsule, Take  by mouth Daily With Breakfast., Disp: , Rfl:   •  ondansetron ODT (ZOFRAN-ODT) 4 MG disintegrating tablet, 1 tablet orally every 6 hours as needed for nausea and vomiting, Disp: 12 tablet, Rfl: 0  •  pantoprazole (PROTONIX) 40 MG EC tablet, Take 40 mg by mouth 2 (Two) Times a Day., Disp: , Rfl:   •  rosuvastatin (CRESTOR) 5 MG tablet, TAKE ONE TABLET BY MOUTH DAILY, Disp: 90 tablet, Rfl: 4  •  sertraline (ZOLOFT) 50 MG tablet, Take 50 mg by mouth Daily., Disp: , Rfl:   •  topiramate (TOPAMAX) 50 MG  tablet, Take 1 tablet by mouth Daily., Disp: 90 tablet, Rfl: 3  •  vitamin B-12 (CYANOCOBALAMIN) 500 MCG tablet, Take 500 mcg by mouth daily., Disp: , Rfl:   •  atorvastatin (LIPITOR) 20 MG tablet, Take 20 mg by mouth Daily., Disp: , Rfl:   •  FLUZONE HIGH-DOSE 0.5 ML suspension prefilled syringe injection, INJECT 0.5 ML INTRAMUSCULARLY ONE TIME ONLY, Disp: , Rfl: 0  •  meloxicam (MOBIC) 15 MG tablet, , Disp: , Rfl:   •  PNEUMOVAX 23 25 MCG/0.5ML vaccine, INJECT 0.5 ML INTRAMUSCULARLY ONE TIME ONLY, Disp: , Rfl: 0    Review of Systems   Constitutional: Positive for activity change and appetite change. Negative for fatigue.        MRI and MRA scheduled for Tuesday, Dr Connell ordered   HENT: Negative for facial swelling, trouble swallowing and voice change.    Eyes: Negative for photophobia, pain and visual disturbance.   Respiratory: Negative for chest tightness, shortness of breath and wheezing.    Cardiovascular: Negative for chest pain, palpitations and leg swelling.   Gastrointestinal: Positive for nausea.   Endocrine: Negative for polydipsia and polyphagia.   Musculoskeletal: Positive for neck pain. Negative for back pain, gait problem and neck stiffness.   Neurological: Positive for dizziness, weakness, light-headedness and headaches (currently has h/a, had constant for 2 weeks). Negative for tremors, seizures, syncope, facial asymmetry, speech difficulty and numbness.   Hematological: Does not bruise/bleed easily.   Psychiatric/Behavioral: Positive for sleep disturbance (ambien). Negative for agitation, behavioral problems, confusion, decreased concentration, dysphoric mood, hallucinations, self-injury and suicidal ideas. The patient is not nervous/anxious and is not hyperactive.           I have reviewed ROS completed by medical assistant.     Objective:    Neurologic Exam  Mental status examination was appropriate.  Funduscopy, visual fields, eye movements and pupillary reflexes were normal.  No facial  weakness was noted.  Gait was normal.  No pattern of focal weakness was noted.  Physical Exam    Assessment/Plan:     Brooke was seen today for migraine.    Diagnoses and all orders for this visit:    Nonintractable episodic headache, unspecified headache type    Intractable periodic headache syndrome    Other specified transient cerebral ischemias    Other orders  -     topiramate (TOPAMAX) 50 MG tablet; Take 1 tablet by mouth Daily.       I do not have a clear-cut neurological explanation for the worsening of her symptoms as above.  Will await the results of the MR I MRA of the brain.  He will call me next Thursday.  I have not changed her medicine.  She has a follow-up for 9 months from now.  Thank you for allowing me to share in the care of this patient.  Delvin Reed M.D.

## 2019-09-06 NOTE — TELEPHONE ENCOUNTER
What choices are compliant- IT didn't have a red flag for me-- I thought she already had these performed.    Can use TIA and intractable periodic headache syndrome if either work    If not please let me know early Monday-- this needs to be ASAP as already been 1 week

## 2019-09-06 NOTE — TELEPHONE ENCOUNTER
Dr. Connell,  On the MRI Angiogram Head the diagnosis code of G93.9 is not medicare compliant if you could change this and send back to me please.       Thanks, Marah

## 2019-09-10 ENCOUNTER — HOSPITAL ENCOUNTER (OUTPATIENT)
Dept: MRI IMAGING | Facility: HOSPITAL | Age: 79
Discharge: HOME | End: 2019-09-10
Admitting: INTERNAL MEDICINE

## 2019-09-10 ENCOUNTER — HOSPITAL ENCOUNTER (OUTPATIENT)
Dept: MRI IMAGING | Facility: HOSPITAL | Age: 79
Discharge: HOME | End: 2019-09-10

## 2019-09-10 DIAGNOSIS — I67.82 TEMPORARY CEREBRAL VASCULAR DYSFUNCTION: ICD-10-CM

## 2019-09-10 PROCEDURE — A9576 INJ PROHANCE MULTIPACK: HCPCS | Performed by: INTERNAL MEDICINE

## 2019-09-10 PROCEDURE — 70544 MR ANGIOGRAPHY HEAD W/O DYE: CPT

## 2019-09-10 PROCEDURE — 25010000002 GADOTERIDOL PER 1 ML: Performed by: INTERNAL MEDICINE

## 2019-09-10 PROCEDURE — 70553 MRI BRAIN STEM W/O & W/DYE: CPT

## 2019-09-10 RX ADMIN — GADOTERIDOL 17 ML: 279.3 INJECTION, SOLUTION INTRAVENOUS at 11:00

## 2019-09-11 ENCOUNTER — TELEPHONE (OUTPATIENT)
Dept: FAMILY MEDICINE CLINIC | Facility: CLINIC | Age: 79
End: 2019-09-11

## 2019-09-11 RX ORDER — ZOLPIDEM TARTRATE 10 MG/1
10 TABLET ORAL NIGHTLY PRN
Qty: 30 TABLET | Refills: 1 | Status: SHIPPED | OUTPATIENT
Start: 2019-09-11 | End: 2019-10-31

## 2019-09-11 NOTE — PROGRESS NOTES
Please call the patient regarding her abnormal result. MRI and MRA- were overall ok- no mass, aneursym or infarcts seen

## 2019-09-12 ENCOUNTER — TELEPHONE (OUTPATIENT)
Dept: NEUROLOGY | Facility: CLINIC | Age: 79
End: 2019-09-12

## 2019-09-12 NOTE — TELEPHONE ENCOUNTER
Continue current treatment.  Just saw the patient 6 days ago.  Would keep regular appointment.  Can see her sooner if symptoms worsen.

## 2019-09-12 NOTE — TELEPHONE ENCOUNTER
Pt stated that her MRI Brain and MRA Head were clear. She said that she's still having the headache and nausea. She wanted to see if she should f/u?

## 2019-09-16 ENCOUNTER — TELEPHONE (OUTPATIENT)
Dept: NEUROLOGY | Facility: CLINIC | Age: 79
End: 2019-09-16

## 2019-09-16 RX ORDER — TOPIRAMATE 100 MG/1
100 TABLET, FILM COATED ORAL DAILY
Qty: 90 TABLET | Refills: 3 | Status: SHIPPED | OUTPATIENT
Start: 2019-09-16 | End: 2020-06-04 | Stop reason: SDUPTHER

## 2019-09-16 NOTE — TELEPHONE ENCOUNTER
Pt is stating this are getting worse, Having headaches 3 or 4 times a day. Should pt see someone else, she stated she cant live like this.

## 2019-09-16 NOTE — TELEPHONE ENCOUNTER
MRI of the brain, MRA of the brain, CRP, and sed rate were all normal.  Recommend increasing topiramate to 100 mg at night.  I sent in new prescription.

## 2019-09-17 NOTE — TELEPHONE ENCOUNTER
Spoke with pt and gave her information. Pt will call back in a few weeks to let us know how it is working

## 2019-09-24 ENCOUNTER — OFFICE VISIT (OUTPATIENT)
Dept: FAMILY MEDICINE CLINIC | Facility: CLINIC | Age: 79
End: 2019-09-24

## 2019-09-24 VITALS
WEIGHT: 188.4 LBS | TEMPERATURE: 97.6 F | SYSTOLIC BLOOD PRESSURE: 128 MMHG | HEART RATE: 70 BPM | BODY MASS INDEX: 30.28 KG/M2 | DIASTOLIC BLOOD PRESSURE: 72 MMHG | HEIGHT: 66 IN | RESPIRATION RATE: 16 BRPM | OXYGEN SATURATION: 96 %

## 2019-09-24 DIAGNOSIS — G43.009 ATYPICAL MIGRAINE: Primary | ICD-10-CM

## 2019-09-24 PROCEDURE — 99213 OFFICE O/P EST LOW 20 MIN: CPT | Performed by: INTERNAL MEDICINE

## 2019-09-24 RX ORDER — AMITRIPTYLINE HYDROCHLORIDE 25 MG/1
25 TABLET, FILM COATED ORAL NIGHTLY
Qty: 30 TABLET | Refills: 2 | Status: SHIPPED | OUTPATIENT
Start: 2019-09-24 | End: 2020-03-03 | Stop reason: SDUPTHER

## 2019-09-24 NOTE — PROGRESS NOTES
"Rooming Tab(CC,VS,Pt Hx,Fall Screen)  Chief Complaint   Patient presents with   • Headache   • Nausea       Subjective     Patients presents today for follow up on migraines and n/v. Began having almost daily headaches associated with nausea about midway through August. Went to urgent care 8/28 with unremarkable head CT. Was seen in clinic the following day. ESR, CRP, and CMP were unremarkable. Due to continued symptoms, had brain MRI and head MRI angiogram which was similar to prior imaging and consistent with chronic small vessel ischemic change.     Headaches are almost daily, no specific time of day is worse (occasionally wakes up with her headache), occasionally feels somewhat dizzy with that which she describes as being slightly disoriented (no loss of balance), gets weak, and nausea. Denies visual loss motor loss, sensory loss, no vomiting, and fevers. Does not have aura with current HA although did have aura with prior history of migraines. States that she has been watching her diet and getting good fluid intake. Denies having any current stress or mood problems. States she likes her new house.     Due to continued symptoms was instructed to increase her topamax to 100mg qhs (had been on 50mg daily for a couple of years) about a week ago. States that the first 2 days after increasing the topamax were good but then had recurrence of her headache. States that she cannot take caffeine bc of breast fibrocystic disease and \"drunk.\" Takes about 3 advil a day and caffeine about twice a week if her HA is especially bad. Reports having loose stools since her HA started back in August which has slightly increased since increasing her topamax. Denies hallucinations.     I have reviewed and updated her medications, medical history and problem list during today's office visit.     Patient Care Team:  Seema Connell MD as PCP - General (Internal Medicine)  Seema Connell MD as PCP - Claims " "Attributed    Problem List Tab  Medications Tab  Synopsis Tab  Chart Review Tab  Care Everywhere Tab  Immunizations Tab  Patient History Tab    Social History     Tobacco Use   • Smoking status: Never Smoker   • Smokeless tobacco: Never Used   Substance Use Topics   • Alcohol use: Yes     Comment: Social       Review of Systems   Constitutional: Negative for fever.   HENT: Negative for sinus pressure and sore throat.    Eyes: Negative for visual disturbance.   Respiratory: Negative for shortness of breath and wheezing.    Cardiovascular: Negative for chest pain and leg swelling.   Gastrointestinal: Positive for nausea. Negative for abdominal pain, blood in stool, constipation, diarrhea and vomiting.   Genitourinary: Negative for dysuria and hematuria.   Musculoskeletal: Negative for joint swelling.   Skin: Negative for rash.   Neurological: Positive for headache. Negative for tremors, syncope and memory problem.   Hematological: Negative for adenopathy.   Psychiatric/Behavioral: Negative for hallucinations.       Objective     Rooming Tab(CC,VS,Pt Hx,Fall Screen)  /72 (BP Location: Left arm, Patient Position: Sitting, Cuff Size: Adult)   Pulse 70   Temp 97.6 °F (36.4 °C) (Oral)   Resp 16   Ht 167.6 cm (66\")   Wt 85.5 kg (188 lb 6.4 oz)   SpO2 96%   BMI 30.41 kg/m²     Body mass index is 30.41 kg/m².    Physical Exam   Constitutional: She appears well-developed and well-nourished. No distress.   HENT:   Head: Normocephalic and atraumatic.   Mouth/Throat: Oropharynx is clear and moist. No oropharyngeal exudate.   Eyes: Conjunctivae and EOM are normal. Pupils are equal, round, and reactive to light. Right eye exhibits no discharge. Left eye exhibits no discharge.   Neck: Normal range of motion.   Cardiovascular: Normal rate and regular rhythm.   Pulmonary/Chest: Effort normal and breath sounds normal. No respiratory distress. She has no wheezes.   Abdominal: Soft. She exhibits no distension. There is no " tenderness. There is no rebound.   Musculoskeletal: Normal range of motion. She exhibits no edema or deformity.   Neurological: She is alert. No cranial nerve deficit. She exhibits normal muscle tone. Coordination normal.   Skin: She is not diaphoretic.   Psychiatric: She has a normal mood and affect. Her behavior is normal. Judgment and thought content normal.         Ct Head Without Contrast    Result Date: 8/29/2019  Impression: No acute intracranial abnormality. No change from previous examination. Brain MRI is more sensitive to evaluate for acute or subacute infarcts and to evaluate for intracranial metastatic disease.  Electronically Signed By-Bailey Lane On:8/29/2019 10:11 AM This report was finalized on [id number] by  Bailey Lane, .    Mri Angiogram Head Without Contrast    Result Date: 9/10/2019  Impression: IMPRESSION :  1. No obvious aneurysm, acute appearing abrupt occlusion, or other overt vascular abnormality appreciated. 2. Absent right A1 segment, anterior cerebral artery, congenital etiology. 3. Additional findings as discussed.  Electronically Signed By-Bobbi Lyman On:9/10/2019 11:49 AM This report was finalized on [id number] by  Bobbi Lyman, .    Mri Brain With & Without Contrast    Result Date: 9/10/2019  Impression: No acute or subacute infarcts on MRI. No intracranial hemorrhage. Mild amount of hyperintense FLAIR and T2 signal intensity in the periventricular and subcortical white matter. This is unchanged from prior study and may be seen with chronic small vessel ischemic change. This is unchanged compared to previous MRI of the brain dated 2/19/2018.   Electronically Signed By-Bailey Lane On:9/10/2019 11:23 AM This report was finalized on [id number] by  Bailey Lane, .        Lab Results   Component Value Date    BUN 16 08/29/2019    CREATININE 0.70 08/29/2019    EGFRIFNONA 81 08/29/2019     08/29/2019    K 4.3 08/29/2019     08/29/2019    CALCIUM 9.6 08/29/2019     ALBUMIN 4.20 08/29/2019    BILITOT 0.4 08/29/2019    ALKPHOS 45 08/29/2019    AST 22 08/29/2019    ALT 23 08/29/2019      Assessment/Plan   Order Review Tab  Health Maintenance Tab  Patient Plan/Order Tab  Diagnoses and all orders for this visit:    1. Atypical migraine (Primary)  Assessment & Plan:  History seems consistent with atypical migraines since mid August and may have component of analgesic rebound headaches (takes 3 advil daily).  ESR, CRP, CMP, head CT, brain MRI and head MRI angiogram have all been unremarkable. Well appearing on exam with no focal neurologic deficits. Has had minimal benefit with increased dose of topamax although no significant SE of the medication. Will add amitriptyline 25mg qhs for HA prevention and encouraged pt to try to reduce amount of advil. If headaches to not improve in the next few weeks will consider referral to headache specialist.         Orders:  -     amitriptyline (ELAVIL) 25 MG tablet; Take 1 tablet by mouth Every Night.  Dispense: 30 tablet; Refill: 2      Wrapup Tab  Return in about 4 weeks (around 10/22/2019).       They were informed of the diagnosis and treatment plan and directed to f/u for any further problems or concerns.

## 2019-09-24 NOTE — ASSESSMENT & PLAN NOTE
History seems consistent with atypical migraines since mid August and may have component of analgesic rebound headaches (takes 3 advil daily).  ESR, CRP, CMP, head CT, brain MRI and head MRI angiogram have all been unremarkable. Well appearing on exam with no focal neurologic deficits. Has had minimal benefit with increased dose of topamax although no significant SE of the medication. Will add amitriptyline 25mg qhs for HA prevention and encouraged pt to try to reduce amount of advil. If headaches to not improve in the next few weeks will consider referral to headache specialist.

## 2019-10-31 ENCOUNTER — OFFICE VISIT (OUTPATIENT)
Dept: FAMILY MEDICINE CLINIC | Facility: CLINIC | Age: 79
End: 2019-10-31

## 2019-10-31 VITALS
OXYGEN SATURATION: 95 % | WEIGHT: 194.8 LBS | RESPIRATION RATE: 16 BRPM | DIASTOLIC BLOOD PRESSURE: 72 MMHG | SYSTOLIC BLOOD PRESSURE: 120 MMHG | BODY MASS INDEX: 31.44 KG/M2 | TEMPERATURE: 98.4 F | HEART RATE: 67 BPM

## 2019-10-31 DIAGNOSIS — E78.41 ELEVATED LIPOPROTEIN(A): ICD-10-CM

## 2019-10-31 DIAGNOSIS — G43.009 ATYPICAL MIGRAINE: Primary | ICD-10-CM

## 2019-10-31 DIAGNOSIS — I10 ESSENTIAL HYPERTENSION: ICD-10-CM

## 2019-10-31 PROCEDURE — 99213 OFFICE O/P EST LOW 20 MIN: CPT | Performed by: INTERNAL MEDICINE

## 2019-11-05 ENCOUNTER — OFFICE (AMBULATORY)
Dept: URBAN - METROPOLITAN AREA CLINIC 64 | Facility: CLINIC | Age: 79
End: 2019-11-05

## 2019-11-05 VITALS
HEIGHT: 66 IN | WEIGHT: 197 LBS | DIASTOLIC BLOOD PRESSURE: 73 MMHG | SYSTOLIC BLOOD PRESSURE: 133 MMHG | HEART RATE: 73 BPM

## 2019-11-05 DIAGNOSIS — R01.1 CARDIAC MURMUR, UNSPECIFIED: ICD-10-CM

## 2019-11-05 DIAGNOSIS — Z86.010 PERSONAL HISTORY OF COLONIC POLYPS: ICD-10-CM

## 2019-11-05 DIAGNOSIS — R11.0 NAUSEA: ICD-10-CM

## 2019-11-05 PROCEDURE — 99212 OFFICE O/P EST SF 10 MIN: CPT | Performed by: NURSE PRACTITIONER

## 2019-12-30 PROBLEM — E78.2 MIXED HYPERLIPIDEMIA: Status: ACTIVE | Noted: 2019-06-19

## 2019-12-30 RX ORDER — METOPROLOL TARTRATE 50 MG/1
TABLET, FILM COATED ORAL
Qty: 180 TABLET | Refills: 3 | Status: SHIPPED | OUTPATIENT
Start: 2019-12-30 | End: 2021-01-07

## 2020-01-07 ENCOUNTER — OFFICE VISIT (OUTPATIENT)
Dept: CARDIOLOGY | Facility: CLINIC | Age: 80
End: 2020-01-07

## 2020-01-07 VITALS
HEIGHT: 66 IN | SYSTOLIC BLOOD PRESSURE: 132 MMHG | WEIGHT: 202 LBS | HEART RATE: 77 BPM | BODY MASS INDEX: 32.47 KG/M2 | DIASTOLIC BLOOD PRESSURE: 80 MMHG

## 2020-01-07 DIAGNOSIS — I10 ESSENTIAL HYPERTENSION: ICD-10-CM

## 2020-01-07 DIAGNOSIS — I48.0 PAROXYSMAL ATRIAL FIBRILLATION (HCC): ICD-10-CM

## 2020-01-07 DIAGNOSIS — Z95.4 HX OF AORTIC VALVE REPLACEMENT WITH TISSUE GRAFT: ICD-10-CM

## 2020-01-07 DIAGNOSIS — I06.1 RHEUMATIC AORTIC VALVE INSUFFICIENCY: Primary | ICD-10-CM

## 2020-01-07 DIAGNOSIS — G43.009 ATYPICAL MIGRAINE: ICD-10-CM

## 2020-01-07 PROBLEM — R00.2 PALPITATIONS: Status: ACTIVE | Noted: 2020-01-07

## 2020-01-07 PROCEDURE — 99214 OFFICE O/P EST MOD 30 MIN: CPT | Performed by: NURSE PRACTITIONER

## 2020-01-07 PROCEDURE — 93000 ELECTROCARDIOGRAM COMPLETE: CPT | Performed by: NURSE PRACTITIONER

## 2020-01-07 NOTE — PROGRESS NOTES
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 female.  She has a known history of aortic stenosis for which she underwent tissue aortic valve replacement in 2012.  She did not have bypass surgery.  Cardiac catheterization in 2012 showed no obstructive disease.  In January 2019 she underwent an exercise stress test which showed fixed apical defects with some mild reversibility questionable attenuation.  Ejection fraction was 55%    The patient reports for earlier than scheduled follow-up due to increasing feelings of palpitations and worsening symptoms that she is related to her migraines.  She recently had her Topamax dose increased as well as started on amitriptyline.    She has not had overt chest pain or dyspnea with exertion.  She thinks she may be in atrial fibrillation though she does not have an EKG to confirm this.  She is in sinus rhythm today.    Her feelings of palpitations were worsened a couple of weeks ago and have now improved.        Past Medical History:   Diagnosis Date   • Aortic stenosis, severe    • Arthritis    • Excessive daytime sleepiness    • Measles    • Migraine    • Mitral valve insufficiency     Mild to Moderate--S/p MVR   • Paroxysmal atrial fibrillation (CMS/HCC) 11/4/2016   • Thyroid disease    • Valvular heart disease 6/19/2019    AV replacement  Tissue 2/5/2013       Past Surgical History:   Procedure Laterality Date   • AORTIC VALVE REPAIR/REPLACEMENT  2/5/13- [facility]   • APPENDECTOMY     • CARDIAC CATHETERIZATION  08/24/12- [facility]   • HYSTERECTOMY     • STERNOTOMY  02/5/13- [facility]   • THYROID SURGERY            Current Outpatient Medications:   •  amitriptyline (ELAVIL) 25 MG tablet, Take 1 tablet by mouth Every Night., Disp: 30 tablet, Rfl: 2  •  aspirin 81 MG chewable tablet, Chew 81 mg daily., Disp: , Rfl:   •  atorvastatin (LIPITOR) 20 MG tablet, Take 20 mg by mouth Daily., Disp: , Rfl:   •  cholecalciferol (VITAMIN D3) 1000 UNITS tablet, Take 1,000 Units by mouth daily., Disp: , Rfl:   •  metoprolol tartrate (LOPRESSOR) 50 MG tablet, TAKE ONE TABLET BY MOUTH TWICE A DAY, Disp: 180 tablet, Rfl: 3  •  Multiple Vitamin (MULTI VITAMIN DAILY PO), Take  by mouth., Disp: , Rfl:   •  Omega-3 Fatty Acids (FISH OIL) 1000 MG capsule capsule, Take  by mouth Daily With Breakfast., Disp: , Rfl:   •  sertraline (ZOLOFT) 50 MG tablet, TAKE ONE TABLET BY MOUTH DAILY, Disp: 30 tablet, Rfl: 3  •  topiramate (TOPAMAX) 100 MG tablet, Take 1 tablet by mouth Daily., Disp: 90 tablet, Rfl: 3  •  vitamin B-12 (CYANOCOBALAMIN) 500 MCG tablet, Take 500 mcg by mouth daily., Disp: , Rfl:     Social History     Socioeconomic History   • Marital status:      Spouse name: Not on file   • Number of children: 2   • Years of education: Not on file   • Highest education level: Not on file   Occupational History   • Occupation: Teacher     Comment:    Tobacco Use   • Smoking status: Never Smoker   • Smokeless tobacco: Never Used   Substance and Sexual Activity   • Alcohol use: Yes     Comment: Social   • Drug use: No   • Sexual activity: Defer     Birth control/protection: Surgical     Comment: Hyst       Family History   Problem Relation Age of Onset   • Dementia Mother    • Heart disease Mother    • Heart disease Father    • Cancer Sister    • Multiple sclerosis Sister    • Dementia Maternal Grandmother    • Heart disease Maternal Grandfather    • Heart disease Paternal Grandmother    • Migraines Daughter        The following portions of the patient's history were reviewed and updated as appropriate: allergies, current  "medications, past family history, past medical history, past social history, past surgical history and problem list.    Review of Systems   Constitution: Negative for decreased appetite and diaphoresis.   HENT: Negative for congestion, hearing loss and nosebleeds.    Cardiovascular: Positive for irregular heartbeat and palpitations. Negative for chest pain, claudication, dyspnea on exertion, leg swelling, near-syncope, orthopnea, paroxysmal nocturnal dyspnea and syncope.   Respiratory: Negative for cough, shortness of breath and sleep disturbances due to breathing.    Endocrine: Negative for polyuria.   Hematologic/Lymphatic: Does not bruise/bleed easily.   Skin: Negative for itching and rash.   Musculoskeletal: Negative for back pain, muscle weakness and myalgias.   Gastrointestinal: Negative for abdominal pain, change in bowel habit and nausea.   Genitourinary: Negative for dysuria, flank pain, frequency and hesitancy.   Neurological: Positive for headaches. Negative for dizziness, tremors and weakness.   Psychiatric/Behavioral: Negative for altered mental status. The patient does not have insomnia.      /80   Pulse 77   Ht 167.6 cm (66\")   Wt 91.6 kg (202 lb)   BMI 32.60 kg/m² .  Objective     Physical Exam   Constitutional: She is oriented to person, place, and time. She appears well-developed and well-nourished. No distress.   HENT:   Head: Normocephalic and atraumatic.   Eyes: Pupils are equal, round, and reactive to light.   Neck: Normal range of motion. Neck supple. No JVD present.   Cardiovascular: Normal rate, regular rhythm, S1 normal, S2 normal and intact distal pulses.   Murmur heard.   Holosystolic murmur is present with a grade of 3/6.  Pulmonary/Chest: Effort normal and breath sounds normal.   Abdominal: Soft. Normal appearance. She exhibits no distension. There is no tenderness.   Musculoskeletal: Normal range of motion. She exhibits no edema.   Neurological: She is alert and oriented to " person, place, and time.   Skin: Skin is warm and dry.   Psychiatric: She has a normal mood and affect.           ECG 12 Lead  Date/Time: 1/7/2020 4:14 PM  Performed by: Ilda Goldman APRN  Authorized by: Ilda Goldman APRN   Comparison: compared with previous ECG from 1/15/2019  Rhythm: sinus rhythm  Rate: normal  BPM: 77  Conduction: incomplete right bundle branch block  Q waves: V1 and V2    QRS axis: normal  Other findings: non-specific ST-T wave changes    Clinical impression: abnormal EKG                     Diagnoses and all orders for this visit:    1. Rheumatic aortic valve insufficiency (Primary)  Comments:  Patient has significant murmur on exam.  No overt signs of heart failure clinically  Orders:  -     Adult Transthoracic Echo Complete W/ Cont if Necessary Per Protocol; Future  -     Basic Metabolic Panel; Future  -     Magnesium; Future  -     TSH; Future  -     CBC & Differential; Future    2. Hx of aortic valve replacement with tissue graft  Comments:  Status post previous tissue aortic valve replacement.    Orders:  -     Adult Transthoracic Echo Complete W/ Cont if Necessary Per Protocol; Future  -     Basic Metabolic Panel; Future  -     Magnesium; Future  -     TSH; Future  -     CBC & Differential; Future    3. Paroxysmal atrial fibrillation (CMS/HCC)  Comments:  Currently in sinus rhythm though patient reports increased recent palpitations a few weeks ago that is now resolved.  Orders:  -     Adult Transthoracic Echo Complete W/ Cont if Necessary Per Protocol; Future  -     Basic Metabolic Panel; Future  -     Magnesium; Future  -     TSH; Future  -     CBC & Differential; Future    4. Essential hypertension  Comments:  Stable on current medical therapy    5. Atypical migraine  Comments:  Recent titration of medications including increasing Topamax and addition of amitriptyline    Other orders  -     ECG 12 Lead                Plan:    Repeat echocardiogram to reassess aortic valve and  function due to progressive murmur and increasing palpitations.    Consider holter monitor if recurrent palpitations.     Check BMP, MG, TSH    Additional recommendations pending review of above

## 2020-01-08 ENCOUNTER — LAB (OUTPATIENT)
Dept: LAB | Facility: HOSPITAL | Age: 80
End: 2020-01-08

## 2020-01-08 ENCOUNTER — HOSPITAL ENCOUNTER (OUTPATIENT)
Dept: CARDIOLOGY | Facility: HOSPITAL | Age: 80
Discharge: HOME | End: 2020-01-08
Admitting: NURSE PRACTITIONER

## 2020-01-08 VITALS — SYSTOLIC BLOOD PRESSURE: 136 MMHG | DIASTOLIC BLOOD PRESSURE: 83 MMHG

## 2020-01-08 DIAGNOSIS — I06.1 RHEUMATIC AORTIC VALVE INSUFFICIENCY: ICD-10-CM

## 2020-01-08 DIAGNOSIS — I48.0 PAROXYSMAL ATRIAL FIBRILLATION (HCC): ICD-10-CM

## 2020-01-08 DIAGNOSIS — Z95.4 HX OF AORTIC VALVE REPLACEMENT WITH TISSUE GRAFT: ICD-10-CM

## 2020-01-08 LAB
ANION GAP SERPL CALCULATED.3IONS-SCNC: 13 MMOL/L (ref 5–15)
BASOPHILS # BLD AUTO: 0.03 10*3/MM3 (ref 0–0.2)
BASOPHILS NFR BLD AUTO: 0.7 % (ref 0–1.5)
BUN BLD-MCNC: 20 MG/DL (ref 8–23)
BUN/CREAT SERPL: 26.3 (ref 7–25)
CALCIUM SPEC-SCNC: 9.5 MG/DL (ref 8.6–10.5)
CHLORIDE SERPL-SCNC: 105 MMOL/L (ref 98–107)
CO2 SERPL-SCNC: 24 MMOL/L (ref 22–29)
CREAT BLD-MCNC: 0.76 MG/DL (ref 0.57–1)
DEPRECATED RDW RBC AUTO: 43.8 FL (ref 37–54)
EOSINOPHIL # BLD AUTO: 0.18 10*3/MM3 (ref 0–0.4)
EOSINOPHIL NFR BLD AUTO: 3.9 % (ref 0.3–6.2)
ERYTHROCYTE [DISTWIDTH] IN BLOOD BY AUTOMATED COUNT: 12.7 % (ref 12.3–15.4)
GFR SERPL CREATININE-BSD FRML MDRD: 74 ML/MIN/1.73
GLUCOSE BLD-MCNC: 98 MG/DL (ref 65–99)
HCT VFR BLD AUTO: 41.8 % (ref 34–46.6)
HGB BLD-MCNC: 13.8 G/DL (ref 12–15.9)
IMM GRANULOCYTES # BLD AUTO: 0.01 10*3/MM3 (ref 0–0.05)
IMM GRANULOCYTES NFR BLD AUTO: 0.2 % (ref 0–0.5)
LYMPHOCYTES # BLD AUTO: 1.7 10*3/MM3 (ref 0.7–3.1)
LYMPHOCYTES NFR BLD AUTO: 37 % (ref 19.6–45.3)
MAGNESIUM SERPL-MCNC: 2 MG/DL (ref 1.6–2.4)
MCH RBC QN AUTO: 30.8 PG (ref 26.6–33)
MCHC RBC AUTO-ENTMCNC: 33 G/DL (ref 31.5–35.7)
MCV RBC AUTO: 93.3 FL (ref 79–97)
MONOCYTES # BLD AUTO: 0.34 10*3/MM3 (ref 0.1–0.9)
MONOCYTES NFR BLD AUTO: 7.4 % (ref 5–12)
NEUTROPHILS # BLD AUTO: 2.33 10*3/MM3 (ref 1.7–7)
NEUTROPHILS NFR BLD AUTO: 50.8 % (ref 42.7–76)
NRBC BLD AUTO-RTO: 0 /100 WBC (ref 0–0.2)
PLATELET # BLD AUTO: 204 10*3/MM3 (ref 140–450)
PMV BLD AUTO: 10.4 FL (ref 6–12)
POTASSIUM BLD-SCNC: 3.9 MMOL/L (ref 3.5–5.2)
RBC # BLD AUTO: 4.48 10*6/MM3 (ref 3.77–5.28)
SODIUM BLD-SCNC: 142 MMOL/L (ref 136–145)
TSH SERPL DL<=0.05 MIU/L-ACNC: 3.75 UIU/ML (ref 0.27–4.2)
WBC NRBC COR # BLD: 4.59 10*3/MM3 (ref 3.4–10.8)

## 2020-01-08 PROCEDURE — 93306 TTE W/DOPPLER COMPLETE: CPT

## 2020-01-08 PROCEDURE — 83735 ASSAY OF MAGNESIUM: CPT

## 2020-01-08 PROCEDURE — 84443 ASSAY THYROID STIM HORMONE: CPT

## 2020-01-08 PROCEDURE — 36415 COLL VENOUS BLD VENIPUNCTURE: CPT

## 2020-01-08 PROCEDURE — 85025 COMPLETE CBC W/AUTO DIFF WBC: CPT

## 2020-01-08 PROCEDURE — 80048 BASIC METABOLIC PNL TOTAL CA: CPT

## 2020-01-19 LAB
BH CV ECHO MEAS - ACS: 1.7 CM
BH CV ECHO MEAS - AO MAX PG (FULL): 14.8 MMHG
BH CV ECHO MEAS - AO MAX PG: 19.3 MMHG
BH CV ECHO MEAS - AO MEAN PG (FULL): 7.7 MMHG
BH CV ECHO MEAS - AO MEAN PG: 10.6 MMHG
BH CV ECHO MEAS - AO ROOT AREA (BSA CORRECTED): 1.6
BH CV ECHO MEAS - AO ROOT AREA: 7.7 CM^2
BH CV ECHO MEAS - AO ROOT DIAM: 3.1 CM
BH CV ECHO MEAS - AO V2 MAX: 219.5 CM/SEC
BH CV ECHO MEAS - AO V2 MEAN: 151.5 CM/SEC
BH CV ECHO MEAS - AO V2 VTI: 56.6 CM
BH CV ECHO MEAS - ASC AORTA: 3.2 CM
BH CV ECHO MEAS - AVA(I,A): 1.4 CM^2
BH CV ECHO MEAS - AVA(I,D): 1.4 CM^2
BH CV ECHO MEAS - AVA(V,A): 1.3 CM^2
BH CV ECHO MEAS - AVA(V,D): 1.3 CM^2
BH CV ECHO MEAS - BSA(HAYCOCK): 2.1 M^2
BH CV ECHO MEAS - BSA: 2 M^2
BH CV ECHO MEAS - BZI_BMI: 31.5 KILOGRAMS/M^2
BH CV ECHO MEAS - BZI_METRIC_HEIGHT: 167.6 CM
BH CV ECHO MEAS - BZI_METRIC_WEIGHT: 88.5 KG
BH CV ECHO MEAS - EDV(CUBED): 134.1 ML
BH CV ECHO MEAS - EDV(MOD-SP4): 54.5 ML
BH CV ECHO MEAS - EDV(TEICH): 124.8 ML
BH CV ECHO MEAS - EF(CUBED): 62.1 %
BH CV ECHO MEAS - EF(MOD-BP): 60 %
BH CV ECHO MEAS - EF(MOD-SP4): 59.8 %
BH CV ECHO MEAS - EF(TEICH): 53.3 %
BH CV ECHO MEAS - ESV(CUBED): 50.8 ML
BH CV ECHO MEAS - ESV(MOD-SP4): 21.9 ML
BH CV ECHO MEAS - ESV(TEICH): 58.3 ML
BH CV ECHO MEAS - FS: 27.6 %
BH CV ECHO MEAS - IVS/LVPW: 0.99
BH CV ECHO MEAS - IVSD: 1 CM
BH CV ECHO MEAS - LA DIMENSION(2D): 4.5 CM
BH CV ECHO MEAS - LV DIASTOLIC VOL/BSA (35-75): 27.6 ML/M^2
BH CV ECHO MEAS - LV MASS(C)D: 193.5 GRAMS
BH CV ECHO MEAS - LV MASS(C)DI: 97.8 GRAMS/M^2
BH CV ECHO MEAS - LV MAX PG: 4.5 MMHG
BH CV ECHO MEAS - LV MEAN PG: 3 MMHG
BH CV ECHO MEAS - LV SYSTOLIC VOL/BSA (12-30): 11.1 ML/M^2
BH CV ECHO MEAS - LV V1 MAX: 105.5 CM/SEC
BH CV ECHO MEAS - LV V1 MEAN: 81.2 CM/SEC
BH CV ECHO MEAS - LV V1 VTI: 28.8 CM
BH CV ECHO MEAS - LVIDD: 5.1 CM
BH CV ECHO MEAS - LVIDS: 3.7 CM
BH CV ECHO MEAS - LVOT AREA: 2.7 CM^2
BH CV ECHO MEAS - LVOT DIAM: 1.9 CM
BH CV ECHO MEAS - LVPWD: 1 CM
BH CV ECHO MEAS - MV A MAX VEL: 89.6 CM/SEC
BH CV ECHO MEAS - MV DEC SLOPE: 592.8 CM/SEC^2
BH CV ECHO MEAS - MV DEC TIME: 0.19 SEC
BH CV ECHO MEAS - MV E MAX VEL: 111.2 CM/SEC
BH CV ECHO MEAS - MV E/A: 1.2
BH CV ECHO MEAS - MV MAX PG: 5 MMHG
BH CV ECHO MEAS - MV MEAN PG: 2 MMHG
BH CV ECHO MEAS - MV V2 MAX: 112.3 CM/SEC
BH CV ECHO MEAS - MV V2 MEAN: 65.8 CM/SEC
BH CV ECHO MEAS - MV V2 VTI: 35.5 CM
BH CV ECHO MEAS - MVA(VTI): 2.2 CM^2
BH CV ECHO MEAS - PA ACC TIME: 0.05 SEC
BH CV ECHO MEAS - PA MAX PG (FULL): 0.55 MMHG
BH CV ECHO MEAS - PA MAX PG: 2.5 MMHG
BH CV ECHO MEAS - PA MEAN PG (FULL): 0.36 MMHG
BH CV ECHO MEAS - PA MEAN PG: 1.4 MMHG
BH CV ECHO MEAS - PA PR(ACCEL): 58.2 MMHG
BH CV ECHO MEAS - PA V2 MAX: 79.2 CM/SEC
BH CV ECHO MEAS - PA V2 MEAN: 56.6 CM/SEC
BH CV ECHO MEAS - PA V2 VTI: 19 CM
BH CV ECHO MEAS - PULM A REVS DUR: 0.1 SEC
BH CV ECHO MEAS - PULM A REVS VEL: 20.9 CM/SEC
BH CV ECHO MEAS - PULM DIAS VEL: 54.3 CM/SEC
BH CV ECHO MEAS - PULM S/D: 1.1
BH CV ECHO MEAS - PULM SYS VEL: 59 CM/SEC
BH CV ECHO MEAS - PVA(I,A): 4.6 CM^2
BH CV ECHO MEAS - PVA(I,D): 4.6 CM^2
BH CV ECHO MEAS - PVA(V,A): 4.3 CM^2
BH CV ECHO MEAS - PVA(V,D): 4.3 CM^2
BH CV ECHO MEAS - QP/QS: 1.1
BH CV ECHO MEAS - RAP SYSTOLE: 3 MMHG
BH CV ECHO MEAS - RV MAX PG: 2 MMHG
BH CV ECHO MEAS - RV MEAN PG: 1.1 MMHG
BH CV ECHO MEAS - RV V1 MAX: 70 CM/SEC
BH CV ECHO MEAS - RV V1 MEAN: 48.4 CM/SEC
BH CV ECHO MEAS - RV V1 VTI: 18.1 CM
BH CV ECHO MEAS - RVDD: 2.3 CM
BH CV ECHO MEAS - RVOT AREA: 4.9 CM^2
BH CV ECHO MEAS - RVOT DIAM: 2.5 CM
BH CV ECHO MEAS - RVSP: 35.2 MMHG
BH CV ECHO MEAS - SI(AO): 219.9 ML/M^2
BH CV ECHO MEAS - SI(CUBED): 42.1 ML/M^2
BH CV ECHO MEAS - SI(LVOT): 39.2 ML/M^2
BH CV ECHO MEAS - SI(MOD-SP4): 16.5 ML/M^2
BH CV ECHO MEAS - SI(TEICH): 33.6 ML/M^2
BH CV ECHO MEAS - SV(AO): 435.2 ML
BH CV ECHO MEAS - SV(CUBED): 83.2 ML
BH CV ECHO MEAS - SV(LVOT): 77.6 ML
BH CV ECHO MEAS - SV(MOD-SP4): 32.6 ML
BH CV ECHO MEAS - SV(RVOT): 88.1 ML
BH CV ECHO MEAS - SV(TEICH): 66.5 ML
BH CV ECHO MEAS - TR MAX VEL: 283.9 CM/SEC

## 2020-01-19 PROCEDURE — 93306 TTE W/DOPPLER COMPLETE: CPT | Performed by: INTERNAL MEDICINE

## 2020-03-03 DIAGNOSIS — G43.009 ATYPICAL MIGRAINE: ICD-10-CM

## 2020-03-03 RX ORDER — AMITRIPTYLINE HYDROCHLORIDE 25 MG/1
25 TABLET, FILM COATED ORAL NIGHTLY
Qty: 30 TABLET | Refills: 2 | Status: SHIPPED | OUTPATIENT
Start: 2020-03-03 | End: 2020-06-04 | Stop reason: SDUPTHER

## 2020-03-03 NOTE — TELEPHONE ENCOUNTER
PATIENT CALLED FOR MED REFILL    amitriptyline (ELAVIL) 25 MG tablet  25 mg, Nightly       PATIENT TAKES HALF OF TABLET NIGHTLY     [facility]  [address] [place] [address]. [phone]    PATIENT CALL BACK NUMBER [phone]

## 2020-06-04 ENCOUNTER — TELEMEDICINE (OUTPATIENT)
Dept: NEUROLOGY | Facility: CLINIC | Age: 80
End: 2020-06-04

## 2020-06-04 DIAGNOSIS — G43.109 MIGRAINE WITH AURA AND WITHOUT STATUS MIGRAINOSUS, NOT INTRACTABLE: Primary | ICD-10-CM

## 2020-06-04 DIAGNOSIS — G45.8 OTHER SPECIFIED TRANSIENT CEREBRAL ISCHEMIAS: ICD-10-CM

## 2020-06-04 DIAGNOSIS — G43.009 ATYPICAL MIGRAINE: ICD-10-CM

## 2020-06-04 PROCEDURE — 99212 OFFICE O/P EST SF 10 MIN: CPT | Performed by: PSYCHIATRY & NEUROLOGY

## 2020-06-04 RX ORDER — AMITRIPTYLINE HYDROCHLORIDE 25 MG/1
25 TABLET, FILM COATED ORAL NIGHTLY
Qty: 90 TABLET | Refills: 3 | Status: SHIPPED | OUTPATIENT
Start: 2020-06-04 | End: 2021-03-08

## 2020-06-04 RX ORDER — TOPIRAMATE 100 MG/1
100 TABLET, FILM COATED ORAL DAILY
Qty: 90 TABLET | Refills: 3 | Status: SHIPPED | OUTPATIENT
Start: 2020-06-04 | End: 2021-06-22 | Stop reason: SDUPTHER

## 2020-06-04 NOTE — PROGRESS NOTES
I performed this clinical encounter by utilizing a real-time telehealth video connection between my location and the patient's location at home.  I obtained verbal consent from the patient to perform this clinical encounter utilizing video and prepared the patient by answering any questions they had about the telehealth interaction.    Subjective:       Patient ID: Brooke Molina is a 79 y.o. female presents for No chief complaint on file.         History of Present Illness  Video visit.  Follow-up of atypical migraine versus TIA.  Patient had some unusual episodes late last year and she was placed on an increased dose of topiramate 100 mg at night.  She also had MRI of the brain which showed small vessel disease and MRA which was unremarkable except for an absent A1 segment.  Recently she was started on amitriptyline 25 mg at night and she seems to be doing quite well.  She is satisfied with her current treatment.    The following portions of the patient's history were reviewed and updated as appropriate: allergies, current medications, past family history, past medical history, past social history, past surgical history and problem list.      Current Outpatient Medications on File Prior to Visit   Medication Sig Dispense Refill   • aspirin 81 MG chewable tablet Chew 81 mg daily.     • atorvastatin (LIPITOR) 20 MG tablet Take 20 mg by mouth Daily.     • cholecalciferol (VITAMIN D3) 1000 UNITS tablet Take 1,000 Units by mouth daily.     • metoprolol tartrate (LOPRESSOR) 50 MG tablet TAKE ONE TABLET BY MOUTH TWICE A  tablet 3   • Multiple Vitamin (MULTI VITAMIN DAILY PO) Take  by mouth.     • Omega-3 Fatty Acids (FISH OIL) 1000 MG capsule capsule Take  by mouth Daily With Breakfast.     • sertraline (ZOLOFT) 50 MG tablet TAKE ONE TABLET BY MOUTH DAILY 30 tablet 2   • vitamin B-12 (CYANOCOBALAMIN) 500 MCG tablet Take 500 mcg by mouth daily.     • [DISCONTINUED] amitriptyline (ELAVIL) 25 MG tablet Take 1  tablet by mouth Every Night. 30 tablet 2   • [DISCONTINUED] topiramate (TOPAMAX) 100 MG tablet Take 1 tablet by mouth Daily. 90 tablet 3     No current facility-administered medications on file prior to visit.            Review of Systems       I have reviewed ROS completed by medical assistant.     Objective:    Neurologic Exam    Physical Exam    Assessment/Plan:  Diagnoses and all orders for this visit:    Migraine with aura and without status migrainosus, not intractable    Other specified transient cerebral ischemias    Atypical migraine  -     amitriptyline (ELAVIL) 25 MG tablet; Take 1 tablet by mouth Every Night.    Other orders  -     topiramate (TOPAMAX) 100 MG tablet; Take 1 tablet by mouth Daily.    Have told her she can try to reduce amitriptyline later but this may be helping her chronic headaches.  Prescription drug management-as above  Follow-up in the office in one year. Thank you for allowing me to share in the care of this patient.  Delvin Reed M.D.     Total time of discussion was 14 minutes.

## 2020-08-14 RX ORDER — ATORVASTATIN CALCIUM 20 MG/1
20 TABLET, FILM COATED ORAL DAILY
Qty: 90 TABLET | Refills: 1 | Status: SHIPPED | OUTPATIENT
Start: 2020-08-14 | End: 2021-02-17

## 2020-08-14 NOTE — TELEPHONE ENCOUNTER
Patient called in requesting a re-fill for     atorvastatin (LIPITOR) 20 MG tablet    [facility] [address] [place] [address]    Best call back # [phone]

## 2020-09-03 ENCOUNTER — TRANSCRIBE ORDERS (OUTPATIENT)
Dept: ADMINISTRATIVE | Facility: HOSPITAL | Age: 80
End: 2020-09-03

## 2020-09-03 DIAGNOSIS — Z12.31 VISIT FOR SCREENING MAMMOGRAM: Primary | ICD-10-CM

## 2020-09-10 ENCOUNTER — HOSPITAL ENCOUNTER (OUTPATIENT)
Dept: MAMMOGRAPHY | Facility: HOSPITAL | Age: 80
Discharge: HOME | End: 2020-09-10
Admitting: OBSTETRICS & GYNECOLOGY

## 2020-09-10 DIAGNOSIS — Z12.31 VISIT FOR SCREENING MAMMOGRAM: ICD-10-CM

## 2020-09-10 PROCEDURE — 77067 SCR MAMMO BI INCL CAD: CPT

## 2020-09-10 PROCEDURE — 77063 BREAST TOMOSYNTHESIS BI: CPT

## 2020-10-30 ENCOUNTER — OFFICE VISIT (OUTPATIENT)
Dept: NEUROLOGY | Facility: CLINIC | Age: 80
End: 2020-10-30

## 2020-10-30 VITALS
HEIGHT: 66 IN | SYSTOLIC BLOOD PRESSURE: 128 MMHG | OXYGEN SATURATION: 97 % | WEIGHT: 200.8 LBS | BODY MASS INDEX: 32.27 KG/M2 | HEART RATE: 74 BPM | DIASTOLIC BLOOD PRESSURE: 74 MMHG

## 2020-10-30 DIAGNOSIS — G43.109 MIGRAINE WITH AURA AND WITHOUT STATUS MIGRAINOSUS, NOT INTRACTABLE: Primary | ICD-10-CM

## 2020-10-30 PROCEDURE — 99213 OFFICE O/P EST LOW 20 MIN: CPT | Performed by: NURSE PRACTITIONER

## 2020-10-30 RX ORDER — BUTALBITAL, ACETAMINOPHEN AND CAFFEINE 50; 325; 40 MG/1; MG/1; MG/1
1 TABLET ORAL EVERY 6 HOURS PRN
Qty: 20 TABLET | Refills: 0 | Status: SHIPPED | OUTPATIENT
Start: 2020-10-30 | End: 2021-03-08

## 2020-10-30 NOTE — PROGRESS NOTES
"Subjective:     Patient ID: Brooke Molina is a 80 y.o. female presenting for follow up for migraine headaches. She is a previous patient of Dr. Reed. Her last visit with him was a telephone visit on 6/4/2020. She was doing well at the time and no changes were made.     She has had migraines for the past 20 years. She states she has a history of \"transient global amnesia\" and has had 5 episodes over the past 20 years. She saw Dr. Reed when all of this started for TIA versus migraine. MRI of the brain in 2019 showed small vessel disease and MRA of the head and neck were unremarkable except for an absent A1 segment.     She states that for the past 2 years the month of September has been the worst month for her with almost a constant migraine. She says the nausea and visual symptoms bother her more than the head pain does.     She is taking Trokendi 100 mg nightly and amitriptyline 25 mg nightly. She denies any problems with her medications. She does ask if she can have a prescription for an as needed medication.     History of Present Illness  The following portions of the patient's history were reviewed and updated as appropriate: allergies, current medications, past family history, past medical history, past social history, past surgical history and problem list.    Review of Systems   Constitutional: Negative for chills, fatigue and fever.   HENT: Negative for hearing loss, tinnitus and trouble swallowing.    Eyes: Negative for photophobia, pain and visual disturbance.   Respiratory: Negative for shortness of breath and wheezing.    Cardiovascular: Negative for chest pain, palpitations and leg swelling.   Gastrointestinal: Negative for diarrhea, nausea and vomiting.   Endocrine: Negative for cold intolerance, heat intolerance and polydipsia.   Genitourinary: Negative for decreased urine volume, difficulty urinating and urgency.   Musculoskeletal: Negative for back pain, neck pain and neck stiffness. "   Skin: Negative for color change, rash and wound.   Allergic/Immunologic: Negative for environmental allergies, food allergies and immunocompromised state.   Neurological: Positive for headaches. Negative for dizziness, tremors, seizures, syncope, facial asymmetry, speech difficulty, weakness, light-headedness and numbness.   Hematological: Negative for adenopathy. Does not bruise/bleed easily.   Psychiatric/Behavioral: Negative for confusion and sleep disturbance. The patient is not nervous/anxious.         Objective:    Neurologic Exam  General: Well nourished, well developed, and in no acute distress.  HEENT: Normocephalic/atraumatic. Mucous membranes moist. Sclerae anicteric.   Heart: Regular rate and rhythm. No murmurs, rubs or gallops.  Lungs: Clear to auscultation bilaterally.  Skin: No notable rashes or lesions on the visible surfaces.   Extremities: No clubbing, cyanosis or significant edema.   Psychiatric: Pleasant, cooperative, and appropriate.   Neurologic Exam:  Mental Status:  Alert and oriented. Speech is fluent. Comprehension is intact.   Cranial Nerves II-XII: Pupils equal, round, reactive to light. Extraocular movements are full and conjugate in all directions. Pursuit movements do not provoke any apparent dizziness or discomfort.  No nystagmus noted.  Hearing is intact to voice. Facial strength and sensation are preserved and symmetric. Tongue and palate midline. Voice non-hoarse, non-dysarthric.   Motor: Normal bulk and tone of bilateral upper and lower extremities. Strength is 5/5 in all 4 extremities both proximally and distally. There are no abnormal or involuntary movements noted.  Sensation: Intact to light touch throughout. Romberg was negative with no significant sway.   Coordination: Fully intact. Finger-to-nose performed accurately bilaterally.  Reflexes: The deep tendon reflexes are 2+/4 in bilateral biceps, brachioradialis, triceps, patella, and Achilles.  No pathologic reflexes were  noted.  Gait: Normal. No ataxia or apraxia.         Physical Exam    Assessment/Plan:     Diagnoses and all orders for this visit:    1. Migraine with aura and without status migrainosus, not intractable (Primary)    Other orders  -     butalbital-acetaminophen-caffeine (Esgic) -40 MG per tablet; Take 1 tablet by mouth Every 6 (Six) Hours As Needed for Headache.  Dispense: 20 tablet; Refill: 0       She is doing well at this time. She says her migraines have been worse the entire month of September the past 2 years. Last year she attributed it to the stress of moving but this year she is not sure what triggered this. It may have been weather changes. For now we will leave her medications the same but I gave her samples of Ubrelvy to try as needed. She will call for a prescription if it works well for her. She will f/u in 6 months. We have discussed possibly seeing her again in July to possibly try adding in a CGRP inhibitor to cover her for August/September should her headaches worsen at this time. We can discuss this again at a future visit.

## 2020-11-02 ENCOUNTER — TELEPHONE (OUTPATIENT)
Dept: FAMILY MEDICINE CLINIC | Facility: CLINIC | Age: 80
End: 2020-11-02

## 2020-11-02 RX ORDER — VALACYCLOVIR HYDROCHLORIDE 1 G/1
1000 TABLET, FILM COATED ORAL 3 TIMES DAILY
Qty: 21 TABLET | Refills: 0 | Status: SHIPPED | OUTPATIENT
Start: 2020-11-02 | End: 2021-03-08

## 2020-11-02 RX ORDER — METHYLPREDNISOLONE 4 MG/1
TABLET ORAL
Qty: 21 EACH | Refills: 0 | Status: SHIPPED | OUTPATIENT
Start: 2020-11-02 | End: 2021-03-08

## 2020-11-02 NOTE — TELEPHONE ENCOUNTER
PATIENT IS CALLING NEEDING TO LEAVE A MESSAGE FOR DR. WHEAT    PATIENT HAS HAD SHINGLES 2 TIMES BEFORE AND SHE HAS IT AGAIN    PATIENT HAS HAD 2 OUTBRAKES OF THE RASH AND IS HAVING SHORTNESS OF BREATH WHICH IS A NEW SYMPTOM    PATIENT IS WANTING SOME MEDICATION SENT TO HER PHARMACY FOR SHINGLES    PHARMACY:  [facility] [place] - [address] [place] [address]  - [phone]  - [phone]       PT@[phone]

## 2021-01-07 RX ORDER — METOPROLOL TARTRATE 50 MG/1
TABLET, FILM COATED ORAL
Qty: 180 TABLET | Refills: 2 | Status: SHIPPED | OUTPATIENT
Start: 2021-01-07 | End: 2021-09-29 | Stop reason: SDUPTHER

## 2021-02-17 RX ORDER — ATORVASTATIN CALCIUM 20 MG/1
TABLET, FILM COATED ORAL
Qty: 30 TABLET | Refills: 0 | Status: SHIPPED | OUTPATIENT
Start: 2021-02-17 | End: 2021-03-29

## 2021-03-08 ENCOUNTER — APPOINTMENT (OUTPATIENT)
Dept: GENERAL RADIOLOGY | Facility: HOSPITAL | Age: 81
End: 2021-03-08

## 2021-03-08 ENCOUNTER — HOSPITAL ENCOUNTER (OUTPATIENT)
Facility: HOSPITAL | Age: 81
Setting detail: OBSERVATION
Discharge: HOME | End: 2021-03-09
Attending: EMERGENCY MEDICINE | Admitting: INTERNAL MEDICINE

## 2021-03-08 DIAGNOSIS — R07.9 CHEST PAIN, UNSPECIFIED TYPE: Primary | ICD-10-CM

## 2021-03-08 PROBLEM — E66.9 OBESITY (BMI 30-39.9): Chronic | Status: ACTIVE | Noted: 2021-03-08

## 2021-03-08 LAB
ALBUMIN SERPL-MCNC: 4.1 G/DL (ref 3.5–5.2)
ALBUMIN/GLOB SERPL: 1.4 G/DL
ALP SERPL-CCNC: 68 U/L (ref 39–117)
ALT SERPL W P-5'-P-CCNC: 33 U/L (ref 1–33)
ANION GAP SERPL CALCULATED.3IONS-SCNC: 9 MMOL/L (ref 5–15)
AST SERPL-CCNC: 26 U/L (ref 1–32)
BASOPHILS # BLD AUTO: 0 10*3/MM3 (ref 0–0.2)
BASOPHILS NFR BLD AUTO: 1 % (ref 0–1.5)
BILIRUB SERPL-MCNC: 0.3 MG/DL (ref 0–1.2)
BUN SERPL-MCNC: 19 MG/DL (ref 8–23)
BUN/CREAT SERPL: 23.5 (ref 7–25)
CALCIUM SPEC-SCNC: 8.9 MG/DL (ref 8.6–10.5)
CHLORIDE SERPL-SCNC: 109 MMOL/L (ref 98–107)
CO2 SERPL-SCNC: 24 MMOL/L (ref 22–29)
CREAT SERPL-MCNC: 0.81 MG/DL (ref 0.57–1)
DEPRECATED RDW RBC AUTO: 47.3 FL (ref 37–54)
EOSINOPHIL # BLD AUTO: 0.2 10*3/MM3 (ref 0–0.4)
EOSINOPHIL NFR BLD AUTO: 4 % (ref 0.3–6.2)
ERYTHROCYTE [DISTWIDTH] IN BLOOD BY AUTOMATED COUNT: 14 % (ref 12.3–15.4)
GFR SERPL CREATININE-BSD FRML MDRD: 68 ML/MIN/1.73
GLOBULIN UR ELPH-MCNC: 2.9 GM/DL
GLUCOSE SERPL-MCNC: 101 MG/DL (ref 65–99)
HCT VFR BLD AUTO: 40.5 % (ref 34–46.6)
HGB BLD-MCNC: 14 G/DL (ref 12–15.9)
HOLD SPECIMEN: NORMAL
HOLD SPECIMEN: NORMAL
LYMPHOCYTES # BLD AUTO: 1.6 10*3/MM3 (ref 0.7–3.1)
LYMPHOCYTES NFR BLD AUTO: 33.5 % (ref 19.6–45.3)
MCH RBC QN AUTO: 32.9 PG (ref 26.6–33)
MCHC RBC AUTO-ENTMCNC: 34.6 G/DL (ref 31.5–35.7)
MCV RBC AUTO: 95 FL (ref 79–97)
MONOCYTES # BLD AUTO: 0.3 10*3/MM3 (ref 0.1–0.9)
MONOCYTES NFR BLD AUTO: 7.3 % (ref 5–12)
NEUTROPHILS NFR BLD AUTO: 2.5 10*3/MM3 (ref 1.7–7)
NEUTROPHILS NFR BLD AUTO: 54.2 % (ref 42.7–76)
NRBC BLD AUTO-RTO: 0.1 /100 WBC (ref 0–0.2)
PLATELET # BLD AUTO: 183 10*3/MM3 (ref 140–450)
PMV BLD AUTO: 8.4 FL (ref 6–12)
POTASSIUM SERPL-SCNC: 3.7 MMOL/L (ref 3.5–5.2)
POTASSIUM SERPL-SCNC: 3.7 MMOL/L (ref 3.5–5.2)
PROT SERPL-MCNC: 7 G/DL (ref 6–8.5)
RBC # BLD AUTO: 4.26 10*6/MM3 (ref 3.77–5.28)
SODIUM SERPL-SCNC: 142 MMOL/L (ref 136–145)
TROPONIN T SERPL-MCNC: <0.01 NG/ML (ref 0–0.03)
TROPONIN T SERPL-MCNC: <0.01 NG/ML (ref 0–0.03)
WBC # BLD AUTO: 4.7 10*3/MM3 (ref 3.4–10.8)
WHOLE BLOOD HOLD SPECIMEN: NORMAL
WHOLE BLOOD HOLD SPECIMEN: NORMAL

## 2021-03-08 PROCEDURE — G0378 HOSPITAL OBSERVATION PER HR: HCPCS

## 2021-03-08 PROCEDURE — 84132 ASSAY OF SERUM POTASSIUM: CPT | Performed by: PHYSICIAN ASSISTANT

## 2021-03-08 PROCEDURE — 84484 ASSAY OF TROPONIN QUANT: CPT | Performed by: PHYSICIAN ASSISTANT

## 2021-03-08 PROCEDURE — C9803 HOPD COVID-19 SPEC COLLECT: HCPCS

## 2021-03-08 PROCEDURE — 85025 COMPLETE CBC W/AUTO DIFF WBC: CPT | Performed by: EMERGENCY MEDICINE

## 2021-03-08 PROCEDURE — 84484 ASSAY OF TROPONIN QUANT: CPT | Performed by: EMERGENCY MEDICINE

## 2021-03-08 PROCEDURE — 80053 COMPREHEN METABOLIC PANEL: CPT | Performed by: EMERGENCY MEDICINE

## 2021-03-08 PROCEDURE — 99284 EMERGENCY DEPT VISIT MOD MDM: CPT

## 2021-03-08 PROCEDURE — 93005 ELECTROCARDIOGRAM TRACING: CPT | Performed by: EMERGENCY MEDICINE

## 2021-03-08 PROCEDURE — 99219 PR INITIAL OBSERVATION CARE/DAY 50 MINUTES: CPT | Performed by: PHYSICIAN ASSISTANT

## 2021-03-08 PROCEDURE — U0005 INFEC AGEN DETEC AMPLI PROBE: HCPCS | Performed by: INTERNAL MEDICINE

## 2021-03-08 PROCEDURE — 71045 X-RAY EXAM CHEST 1 VIEW: CPT

## 2021-03-08 PROCEDURE — U0003 INFECTIOUS AGENT DETECTION BY NUCLEIC ACID (DNA OR RNA); SEVERE ACUTE RESPIRATORY SYNDROME CORONAVIRUS 2 (SARS-COV-2) (CORONAVIRUS DISEASE [COVID-19]), AMPLIFIED PROBE TECHNIQUE, MAKING USE OF HIGH THROUGHPUT TECHNOLOGIES AS DESCRIBED BY CMS-2020-01-R: HCPCS | Performed by: INTERNAL MEDICINE

## 2021-03-08 RX ORDER — CALCIUM GLUCONATE 20 MG/ML
2 INJECTION, SOLUTION INTRAVENOUS AS NEEDED
Status: DISCONTINUED | OUTPATIENT
Start: 2021-03-08 | End: 2021-03-09 | Stop reason: HOSPADM

## 2021-03-08 RX ORDER — SODIUM CHLORIDE 0.9 % (FLUSH) 0.9 %
10 SYRINGE (ML) INJECTION EVERY 12 HOURS SCHEDULED
Status: DISCONTINUED | OUTPATIENT
Start: 2021-03-08 | End: 2021-03-09 | Stop reason: HOSPADM

## 2021-03-08 RX ORDER — ASPIRIN 81 MG/1
81 TABLET, CHEWABLE ORAL DAILY
Status: DISCONTINUED | OUTPATIENT
Start: 2021-03-09 | End: 2021-03-09 | Stop reason: HOSPADM

## 2021-03-08 RX ORDER — ATORVASTATIN CALCIUM 20 MG/1
20 TABLET, FILM COATED ORAL DAILY
Status: DISCONTINUED | OUTPATIENT
Start: 2021-03-09 | End: 2021-03-09 | Stop reason: HOSPADM

## 2021-03-08 RX ORDER — MAGNESIUM SULFATE HEPTAHYDRATE 40 MG/ML
4 INJECTION, SOLUTION INTRAVENOUS AS NEEDED
Status: DISCONTINUED | OUTPATIENT
Start: 2021-03-08 | End: 2021-03-09 | Stop reason: HOSPADM

## 2021-03-08 RX ORDER — AMITRIPTYLINE HYDROCHLORIDE 25 MG/1
12.5 TABLET, FILM COATED ORAL NIGHTLY
Status: DISCONTINUED | OUTPATIENT
Start: 2021-03-09 | End: 2021-03-09 | Stop reason: HOSPADM

## 2021-03-08 RX ORDER — POTASSIUM CHLORIDE 1.5 G/1.77G
40 POWDER, FOR SOLUTION ORAL AS NEEDED
Status: DISCONTINUED | OUTPATIENT
Start: 2021-03-08 | End: 2021-03-09 | Stop reason: HOSPADM

## 2021-03-08 RX ORDER — SODIUM CHLORIDE 0.9 % (FLUSH) 0.9 %
10 SYRINGE (ML) INJECTION AS NEEDED
Status: DISCONTINUED | OUTPATIENT
Start: 2021-03-08 | End: 2021-03-09 | Stop reason: HOSPADM

## 2021-03-08 RX ORDER — ASPIRIN 81 MG/1
324 TABLET, CHEWABLE ORAL ONCE
Status: COMPLETED | OUTPATIENT
Start: 2021-03-08 | End: 2021-03-08

## 2021-03-08 RX ORDER — NITROGLYCERIN 0.4 MG/1
0.4 TABLET SUBLINGUAL
Status: DISCONTINUED | OUTPATIENT
Start: 2021-03-08 | End: 2021-03-09 | Stop reason: HOSPADM

## 2021-03-08 RX ORDER — CHOLECALCIFEROL (VITAMIN D3) 125 MCG
5 CAPSULE ORAL NIGHTLY PRN
Status: DISCONTINUED | OUTPATIENT
Start: 2021-03-08 | End: 2021-03-09 | Stop reason: HOSPADM

## 2021-03-08 RX ORDER — ACETAMINOPHEN 650 MG/1
650 SUPPOSITORY RECTAL EVERY 4 HOURS PRN
Status: DISCONTINUED | OUTPATIENT
Start: 2021-03-08 | End: 2021-03-09 | Stop reason: HOSPADM

## 2021-03-08 RX ORDER — POTASSIUM CHLORIDE 20 MEQ/1
40 TABLET, EXTENDED RELEASE ORAL AS NEEDED
Status: DISCONTINUED | OUTPATIENT
Start: 2021-03-08 | End: 2021-03-09 | Stop reason: HOSPADM

## 2021-03-08 RX ORDER — ONDANSETRON 4 MG/1
4 TABLET, FILM COATED ORAL EVERY 6 HOURS PRN
Status: DISCONTINUED | OUTPATIENT
Start: 2021-03-08 | End: 2021-03-09 | Stop reason: HOSPADM

## 2021-03-08 RX ORDER — TOPIRAMATE 100 MG/1
100 TABLET, FILM COATED ORAL DAILY
Status: DISCONTINUED | OUTPATIENT
Start: 2021-03-08 | End: 2021-03-09 | Stop reason: HOSPADM

## 2021-03-08 RX ORDER — CALCIUM GLUCONATE 20 MG/ML
1 INJECTION, SOLUTION INTRAVENOUS AS NEEDED
Status: DISCONTINUED | OUTPATIENT
Start: 2021-03-08 | End: 2021-03-09 | Stop reason: HOSPADM

## 2021-03-08 RX ORDER — AMITRIPTYLINE HYDROCHLORIDE 25 MG/1
12.5 TABLET, FILM COATED ORAL NIGHTLY
COMMUNITY
End: 2021-07-06 | Stop reason: SDUPTHER

## 2021-03-08 RX ORDER — MAGNESIUM SULFATE HEPTAHYDRATE 40 MG/ML
2 INJECTION, SOLUTION INTRAVENOUS AS NEEDED
Status: DISCONTINUED | OUTPATIENT
Start: 2021-03-08 | End: 2021-03-09 | Stop reason: HOSPADM

## 2021-03-08 RX ORDER — ACETAMINOPHEN 325 MG/1
650 TABLET ORAL EVERY 4 HOURS PRN
Status: DISCONTINUED | OUTPATIENT
Start: 2021-03-08 | End: 2021-03-09 | Stop reason: HOSPADM

## 2021-03-08 RX ORDER — ACETAMINOPHEN 160 MG/5ML
650 SOLUTION ORAL EVERY 4 HOURS PRN
Status: DISCONTINUED | OUTPATIENT
Start: 2021-03-08 | End: 2021-03-09 | Stop reason: HOSPADM

## 2021-03-08 RX ORDER — ONDANSETRON 2 MG/ML
4 INJECTION INTRAMUSCULAR; INTRAVENOUS EVERY 6 HOURS PRN
Status: DISCONTINUED | OUTPATIENT
Start: 2021-03-08 | End: 2021-03-09 | Stop reason: HOSPADM

## 2021-03-08 RX ADMIN — ASPIRIN 324 MG: 81 TABLET, CHEWABLE ORAL at 19:31

## 2021-03-08 RX ADMIN — Medication 10 ML: at 23:52

## 2021-03-08 RX ADMIN — AMITRIPTYLINE HYDROCHLORIDE 12.5 MG: 25 TABLET, FILM COATED ORAL at 23:52

## 2021-03-08 RX ADMIN — TOPIRAMATE 100 MG: 100 TABLET, FILM COATED ORAL at 23:52

## 2021-03-09 ENCOUNTER — APPOINTMENT (OUTPATIENT)
Dept: NUCLEAR MEDICINE | Facility: HOSPITAL | Age: 81
End: 2021-03-09

## 2021-03-09 ENCOUNTER — READMISSION MANAGEMENT (OUTPATIENT)
Dept: CALL CENTER | Facility: HOSPITAL | Age: 81
End: 2021-03-09

## 2021-03-09 ENCOUNTER — APPOINTMENT (OUTPATIENT)
Dept: CARDIOLOGY | Facility: HOSPITAL | Age: 81
End: 2021-03-09

## 2021-03-09 VITALS
SYSTOLIC BLOOD PRESSURE: 123 MMHG | HEART RATE: 90 BPM | RESPIRATION RATE: 16 BRPM | OXYGEN SATURATION: 96 % | TEMPERATURE: 98.4 F | DIASTOLIC BLOOD PRESSURE: 78 MMHG | WEIGHT: 190 LBS | HEIGHT: 66 IN | BODY MASS INDEX: 30.53 KG/M2

## 2021-03-09 LAB
ANION GAP SERPL CALCULATED.3IONS-SCNC: 7 MMOL/L (ref 5–15)
BASOPHILS # BLD AUTO: 0 10*3/MM3 (ref 0–0.2)
BASOPHILS NFR BLD AUTO: 0.7 % (ref 0–1.5)
BUN SERPL-MCNC: 20 MG/DL (ref 8–23)
BUN/CREAT SERPL: 27 (ref 7–25)
CALCIUM SPEC-SCNC: 8.7 MG/DL (ref 8.6–10.5)
CHLORIDE SERPL-SCNC: 109 MMOL/L (ref 98–107)
CHOLEST SERPL-MCNC: 169 MG/DL (ref 0–200)
CO2 SERPL-SCNC: 26 MMOL/L (ref 22–29)
CREAT SERPL-MCNC: 0.74 MG/DL (ref 0.57–1)
D DIMER PPP FEU-MCNC: 0.49 MG/L (FEU) (ref 0–0.59)
DEPRECATED RDW RBC AUTO: 46.4 FL (ref 37–54)
EOSINOPHIL # BLD AUTO: 0.1 10*3/MM3 (ref 0–0.4)
EOSINOPHIL NFR BLD AUTO: 3.5 % (ref 0.3–6.2)
ERYTHROCYTE [DISTWIDTH] IN BLOOD BY AUTOMATED COUNT: 13.9 % (ref 12.3–15.4)
GFR SERPL CREATININE-BSD FRML MDRD: 76 ML/MIN/1.73
GLUCOSE SERPL-MCNC: 103 MG/DL (ref 65–99)
HCT VFR BLD AUTO: 36.6 % (ref 34–46.6)
HDLC SERPL-MCNC: 67 MG/DL (ref 40–60)
HGB BLD-MCNC: 13 G/DL (ref 12–15.9)
LDLC SERPL CALC-MCNC: 89 MG/DL (ref 0–100)
LDLC/HDLC SERPL: 1.31 {RATIO}
LYMPHOCYTES # BLD AUTO: 1.4 10*3/MM3 (ref 0.7–3.1)
LYMPHOCYTES NFR BLD AUTO: 32.3 % (ref 19.6–45.3)
MAGNESIUM SERPL-MCNC: 1.9 MG/DL (ref 1.6–2.4)
MCH RBC QN AUTO: 33.4 PG (ref 26.6–33)
MCHC RBC AUTO-ENTMCNC: 35.5 G/DL (ref 31.5–35.7)
MCV RBC AUTO: 94 FL (ref 79–97)
MONOCYTES # BLD AUTO: 0.4 10*3/MM3 (ref 0.1–0.9)
MONOCYTES NFR BLD AUTO: 8.6 % (ref 5–12)
NEUTROPHILS NFR BLD AUTO: 2.3 10*3/MM3 (ref 1.7–7)
NEUTROPHILS NFR BLD AUTO: 54.9 % (ref 42.7–76)
NRBC BLD AUTO-RTO: 0.2 /100 WBC (ref 0–0.2)
PLATELET # BLD AUTO: 171 10*3/MM3 (ref 140–450)
PMV BLD AUTO: 8.4 FL (ref 6–12)
POTASSIUM SERPL-SCNC: 3.8 MMOL/L (ref 3.5–5.2)
RBC # BLD AUTO: 3.9 10*6/MM3 (ref 3.77–5.28)
SARS-COV-2 RNA PNL SPEC NAA+PROBE: NOT DETECTED
SODIUM SERPL-SCNC: 142 MMOL/L (ref 136–145)
TRIGL SERPL-MCNC: 70 MG/DL (ref 0–150)
VIT B12 BLD-MCNC: >2000 PG/ML (ref 211–946)
VLDLC SERPL-MCNC: 13 MG/DL (ref 5–40)
WBC # BLD AUTO: 4.2 10*3/MM3 (ref 3.4–10.8)

## 2021-03-09 PROCEDURE — 93018 CV STRESS TEST I&R ONLY: CPT | Performed by: NURSE PRACTITIONER

## 2021-03-09 PROCEDURE — 82607 VITAMIN B-12: CPT | Performed by: PHYSICIAN ASSISTANT

## 2021-03-09 PROCEDURE — 85025 COMPLETE CBC W/AUTO DIFF WBC: CPT | Performed by: PHYSICIAN ASSISTANT

## 2021-03-09 PROCEDURE — 85379 FIBRIN DEGRADATION QUANT: CPT | Performed by: INTERNAL MEDICINE

## 2021-03-09 PROCEDURE — 93306 TTE W/DOPPLER COMPLETE: CPT | Performed by: INTERNAL MEDICINE

## 2021-03-09 PROCEDURE — 78452 HT MUSCLE IMAGE SPECT MULT: CPT | Performed by: INTERNAL MEDICINE

## 2021-03-09 PROCEDURE — G0378 HOSPITAL OBSERVATION PER HR: HCPCS

## 2021-03-09 PROCEDURE — 80048 BASIC METABOLIC PNL TOTAL CA: CPT | Performed by: PHYSICIAN ASSISTANT

## 2021-03-09 PROCEDURE — 99217 PR OBSERVATION CARE DISCHARGE MANAGEMENT: CPT | Performed by: INTERNAL MEDICINE

## 2021-03-09 PROCEDURE — 80061 LIPID PANEL: CPT | Performed by: PHYSICIAN ASSISTANT

## 2021-03-09 PROCEDURE — 99214 OFFICE O/P EST MOD 30 MIN: CPT | Performed by: INTERNAL MEDICINE

## 2021-03-09 PROCEDURE — 93306 TTE W/DOPPLER COMPLETE: CPT

## 2021-03-09 PROCEDURE — 0 TECHNETIUM SESTAMIBI: Performed by: INTERNAL MEDICINE

## 2021-03-09 PROCEDURE — A9500 TC99M SESTAMIBI: HCPCS | Performed by: INTERNAL MEDICINE

## 2021-03-09 PROCEDURE — 78452 HT MUSCLE IMAGE SPECT MULT: CPT

## 2021-03-09 PROCEDURE — 93017 CV STRESS TEST TRACING ONLY: CPT

## 2021-03-09 PROCEDURE — 83735 ASSAY OF MAGNESIUM: CPT | Performed by: PHYSICIAN ASSISTANT

## 2021-03-09 RX ORDER — METOPROLOL TARTRATE 50 MG/1
50 TABLET, FILM COATED ORAL EVERY 12 HOURS SCHEDULED
Status: DISCONTINUED | OUTPATIENT
Start: 2021-03-09 | End: 2021-03-09

## 2021-03-09 RX ADMIN — CYANOCOBALAMIN TAB 250 MCG 500 MCG: 250 TAB at 10:46

## 2021-03-09 RX ADMIN — TECHNETIUM TC 99M SESTAMIBI 1 DOSE: 1 INJECTION INTRAVENOUS at 09:48

## 2021-03-09 RX ADMIN — Medication 10 ML: at 10:46

## 2021-03-09 RX ADMIN — TECHNETIUM TC 99M SESTAMIBI 1 DOSE: 1 INJECTION INTRAVENOUS at 06:45

## 2021-03-09 RX ADMIN — SERTRALINE HYDROCHLORIDE 50 MG: 50 TABLET ORAL at 10:46

## 2021-03-09 NOTE — DISCHARGE SUMMARY
[facility] Medicine Services  DISCHARGE SUMMARY        Prepared For PCP:  Seema Connell MD    Patient Name: Brooke Molina  : 1940  MRN: [id number]      Date of Admission:   3/8/2021    Date of Discharge:  3/9/2021    Length of stay:  LOS: 0 days     Hospital Course     Presenting Problem:   Chest pain, unspecified type [R07.9]      Active Hospital Problems    Diagnosis  POA   • **Chest pain [R07.9]  Yes   • Obesity (BMI 30-39.9) [E66.9]  Yes   • Situational depression [F43.21]  Yes   • Mixed hyperlipidemia [E78.2]  Yes   • Essential hypertension [I10]  Yes   • Valvular heart disease [I38]  Yes   • Migraine headache [G43.909]  Yes      Resolved Hospital Problems   No resolved problems to display.           Hospital Course:  Brooke Molina is a 80 y.o. female *    Chest pain  -Troponin: Less than 0.010, trend  -Chest x-ray shows stable cardiomegaly with no acute cardiopulmonary disease noted.    -EKG shows sinus rhythm at 88 with some diffuse T wave inversion.  -In the ED patient given 324 mg aspirin and Nitropaste  -Hold metoprolol  -Check lipid panel  -N.p.o. after midnight  -Stress test pending  Echo pending  Cardiology opinion appreciated  Chest pain-free now  May discharge later today if cleared by cardiology  -D-dimer negative    -Continue daily aspirin.     A. fib with history of valvular heart disease  -EKG shows sinus rhythm at 88 with some diffuse T wave inversion.  -Resume metoprolol  Event monitor by cardiology as an outpatient is planned  -Patient on aspirin  -Defer anticoagulation needs to cardiology opinion    Hypertension  -Poorly controlled with a blood pressure on admission of 162/69  -Resume metoprolol after stress test     Hyperlipidemia  -Continue statin     Depression  -Elavil and Zoloft     B12 deficiency  -on supplementation     History of migraines  -Continue Topamax  -Toradol and Fioricet as needed     Obesity (BMI: 30.34)  -Encourage diet  lifestyle modifications     -Right leg cramps.  Few days prior to admission  D-dimer negative  Low likelihood for DVT  No localized tenderness or collection or vascular abnormality on examination        Recommendation for Outpatient Providers:             Reasons For Change In Medications and Indications for New Medications:        Day of Discharge     HPI:       Vital Signs:   Temp:  [97.3 °F (36.3 °C)-97.9 °F (36.6 °C)] 97.7 °F (36.5 °C)  Heart Rate:  [64-93] 91  Resp:  [14-18] 16  BP: (128-164)/(58-84) 146/84     Physical Exam:  Physical Exam  Vitals and nursing note reviewed.   Constitutional:       General: She is not in acute distress.     Appearance: Normal appearance. She is well-developed. She is not ill-appearing, toxic-appearing or diaphoretic.   HENT:      Head: Normocephalic and atraumatic.      Right Ear: Ear canal and external ear normal.      Left Ear: Ear canal and external ear normal.      Nose: Nose normal. No congestion or rhinorrhea.      Mouth/Throat:      Mouth: Mucous membranes are moist.      Pharynx: No oropharyngeal exudate.   Eyes:      General: No scleral icterus.        Right eye: No discharge.         Left eye: No discharge.      Extraocular Movements: Extraocular movements intact.      Conjunctiva/sclera: Conjunctivae normal.      Pupils: Pupils are equal, round, and reactive to light.   Neck:      Thyroid: No thyromegaly.      Vascular: No carotid bruit or JVD.      Trachea: No tracheal deviation.   Cardiovascular:      Rate and Rhythm: Normal rate and regular rhythm.      Pulses: Normal pulses.      Heart sounds: Normal heart sounds. No murmur. No friction rub. No gallop.    Pulmonary:      Effort: Pulmonary effort is normal. No respiratory distress.      Breath sounds: Normal breath sounds. No stridor. No wheezing, rhonchi or rales.   Chest:      Chest wall: No tenderness.   Abdominal:      General: Bowel sounds are normal. There is no distension.      Palpations: Abdomen is soft.  There is no mass.      Tenderness: There is no abdominal tenderness. There is no guarding or rebound.      Hernia: No hernia is present.   Musculoskeletal:         General: No swelling, tenderness, deformity or signs of injury. Normal range of motion.      Cervical back: Normal range of motion and neck supple. No rigidity. No muscular tenderness.      Right lower leg: No edema.      Left lower leg: No edema.   Lymphadenopathy:      Cervical: No cervical adenopathy.   Skin:     General: Skin is warm and dry.      Coloration: Skin is not jaundiced or pale.      Findings: No bruising, erythema or rash.   Neurological:      General: No focal deficit present.      Mental Status: She is alert and oriented to person, place, and time. Mental status is at baseline.      Cranial Nerves: No cranial nerve deficit.      Sensory: No sensory deficit.      Motor: No weakness or abnormal muscle tone.      Coordination: Coordination normal.   Psychiatric:         Mood and Affect: Mood normal.         Behavior: Behavior normal.         Thought Content: Thought content normal.         Judgment: Judgment normal.         Pertinent  and/or Most Recent Results     Results from last 7 days   Lab Units 03/09/21 0352 03/08/21 2237 03/08/21 1928   WBC 10*3/mm3 4.20  --  4.70   HEMOGLOBIN g/dL 13.0  --  14.0   HEMATOCRIT % 36.6  --  40.5   PLATELETS 10*3/mm3 171  --  183   SODIUM mmol/L 142  --  142   POTASSIUM mmol/L 3.8 3.7 3.7   CHLORIDE mmol/L 109*  --  109*   CO2 mmol/L 26.0  --  24.0   BUN mg/dL 20  --  19   CREATININE mg/dL 0.74  --  0.81   GLUCOSE mg/dL 103*  --  101*   CALCIUM mg/dL 8.7  --  8.9     Results from last 7 days   Lab Units 03/08/21 1928   BILIRUBIN mg/dL 0.3   ALK PHOS U/L 68   ALT (SGPT) U/L 33   AST (SGOT) U/L 26     Results from last 7 days   Lab Units 03/09/21  0352   CHOLESTEROL mg/dL 169   TRIGLYCERIDES mg/dL 70   HDL CHOL mg/dL 67*     Results from last 7 days   Lab Units 03/08/21 2237 03/08/21 1928   TROPONIN  T ng/mL <0.010 <0.010       Brief Urine Lab Results     None          Microbiology Results Abnormal     Procedure Component Value - Date/Time    COVID-19,CEPHEID,COR/QUINCY/PAD IN-HOUSE(OR EMERGENT/ADD-ON),NP SWAB IN TRANSPORT MEDIA 3-4 HR TAT, RT-PCR - Swab, Nasopharynx [[id number]]  (Normal) Collected: 03/08/21 2147    Lab Status: Final result Specimen: Swab from Nasopharynx Updated: 03/09/21 0543     COVID19 Not Detected    Narrative:      Fact sheet for providers: https://www.fda.gov/media/439915/download     Fact sheet for patients: https://www.fda.gov/media/235579/download          XR Chest 1 View    Result Date: 3/8/2021  Impression:   1.  Stable cardiomegaly. 2.  No acute cardiopulmonary disease.   Electronically Signed By-Brendon Archibald MD On:3/8/2021 8:03 PM This report was finalized on [id number] by  Brendon Archibald MD.              Results for orders placed during the hospital encounter of 01/08/20    Adult Transthoracic Echo Complete W/ Cont if Necessary Per Protocol    Interpretation Summary  · Left ventricular systolic function is normal.  · Left atrial cavity size is borderline dilated.  · Mild tricuspid valve regurgitation is present.    Technically satisfactory exam shows grossly normal chamber dimensions with satisfactory global contractility; LVEF 60%.  The aortic valve appears bioprosthetic with adequate opening and coaptation and no perivalvular leak.  Individual leaflets are not sufficiently visualized.  Mild to moderate TR with borderline increased RV systolic pressure quantitated 36 mmHg.              Test Results Pending at Discharge        Procedures Performed           Consults:   Consults     Date and Time Order Name Status Description    3/9/2021  9:23 AM Inpatient Cardiology Consult Completed     3/8/2021  8:25 PM Hospitalist (on-call MD unless specified) Completed             Discharge Details        Discharge Medications      Continue These Medications      Instructions Start Date      amitriptyline 25 MG tablet  Commonly known as: ELAVIL   12.5 mg, Oral, Nightly      aspirin 81 MG chewable tablet   81 mg, Oral, Daily      atorvastatin 20 MG tablet  Commonly known as: LIPITOR   TAKE ONE TABLET BY MOUTH DAILY      cholecalciferol 25 MCG (1000 UT) tablet  Commonly known as: VITAMIN D3   1,000 Units, Oral, Daily      fish oil 1000 MG capsule capsule   1,000 mg, Oral, Daily With Breakfast      metoprolol tartrate 50 MG tablet  Commonly known as: LOPRESSOR   TAKE ONE TABLET BY MOUTH TWICE A DAY      Multi Vitamin Daily tablet tablet   1 tablet, Oral, Daily      sertraline 50 MG tablet  Commonly known as: ZOLOFT   TAKE ONE TABLET BY MOUTH DAILY      topiramate 100 MG tablet  Commonly known as: TOPAMAX   100 mg, Oral, Daily      vitamin B-12 500 MCG tablet  Commonly known as: CYANOCOBALAMIN   500 mcg, Oral, Daily             Allergies   Allergen Reactions   • Codeine GI Intolerance         Discharge Disposition:  Home or Self Care    Diet:  Hospital:  Diet Order   Procedures   • Diet Cardiac; Healthy Heart         Discharge Activity:         CODE STATUS:    Code Status and Medical Interventions:   Ordered at: 03/08/21 2111     Code Status:    CPR     Medical Interventions (Level of Support Prior to Arrest):    Full         Follow-up Appointments  Future Appointments   Date Time Provider Department Center   4/22/2021  2:30 PM Seema Connell MD [facility]   4/30/2021  9:30 AM Javier Dias APRN [facility]             Condition on Discharge:      Stable      This patient has been examined wearing appropriate Personal Protective Equipment and discussed with hospital infection control department. 03/09/21      Electronically signed by Hayes Oropeza MD, 03/09/21, 2:27 PM EST.      Time: I spent 40 minutes on this discharge activity which included face-to-face encounter with the patient/reviewing the data in the system/coordination of the care with the nursing staff as well as  consultants/documentation/entering orders.

## 2021-03-09 NOTE — PLAN OF CARE
Goal Outcome Evaluation:     Progress: no change  Outcome Summary: Patient has not complainted of chest pain this shift. Myoview completed and awaiting results. Cardiology consult ordered. Patient is stable, will continue to monitor.

## 2021-03-09 NOTE — ED NOTES
Pt c/o left sided chest pain and generalized weakness that started this morning. About x1 hour ago the pain began radiating into the left arm and left rib.      Isa Laboy, RN  03/08/21 1917

## 2021-03-09 NOTE — PROGRESS NOTES
Discharge Planning Assessment  [facility]     Patient Name: Brooke Molina  MRN: [id number]  Today's Date: 3/9/2021    Admit Date: 3/8/2021    Discharge Needs Assessment     Row Name 03/09/21 1239       Living Environment    Current Living Arrangements  home/apartment/condo    Primary Care Provided by  self    Provides Primary Care For  no one    Family Caregiver if Needed  child(tanisha), adult    Family Caregiver Names  Daughter- Hawa    Quality of Family Relationships  supportive;helpful;involved    Able to Return to Prior Arrangements  yes       Resource/Environmental Concerns    Resource/Environmental Concerns  none    Transportation Concerns  car, none       Transition Planning    Patient/Family Anticipates Transition to  home with family    Patient/Family Anticipated Services at Transition  none    Transportation Anticipated  family or friend will provide       Discharge Needs Assessment    Readmission Within the Last 30 Days  no previous admission in last 30 days    Equipment Currently Used at Home  none    Concerns to be Addressed  denies needs/concerns at this time;no discharge needs identified    Anticipated Changes Related to Illness  none    Equipment Needed After Discharge  none    Provided Post Acute Provider List?  Refused        Discharge Plan     Row Name 03/09/21 1239       Plan    Plan  DC Plan: Anticipate routine home, denies needs at this time.    Patient/Family in Agreement with Plan  yes    Plan Comments  CM met with patient and family member at bedside to discuss dc planning. Patient reports she is independent at home and does not use any DME. Declined need for HH services at this time. Preferred pharmacy confirmed- [facility]. PCP confirmed- Becki. DC Barriers: Myoview and echo performed with results pending. Anticipate dc home if results are WNL and once cleared by cardiology.        Demographic Summary     Row Name 03/09/21 1238       General Information    Admission Type   observation    Reason for Consult  discharge planning    Preferred Language  English     Used During This Interaction  no       Contact Information    Permission Granted to Share Info With          Functional Status     Row Name 03/09/21 1239       Functional Status    Usual Activity Tolerance  good    Current Activity Tolerance  good       Functional Status, IADL    Medications  independent    Meal Preparation  independent    Housekeeping  independent    Laundry  independent    Shopping  independent        Met with patient in room wearing PPE: mask/goggles.      Maintained distance greater than six feet and spent less than 15 minutes in the room.      Valeria Pandya RN     Office Phone: [phone]  Office Cell: [phone]

## 2021-03-09 NOTE — CONSULTS
CARDIOLOGY CONSULT NOTE      Referring Provider: Dr. Oropeza    Reason for Consultation: History of AVR and chest pain    Attending: Hayes Oropeza *    Chief complaint    Chest pain    Subjective .     History of present illness:  Brooke Molina is a 80 y.o. female who presents with atypical chest pain.     She has a known history of aortic stenosis for which she underwent tissue aortic valve replacement in 2012.  She did not have bypass surgery.  Cardiac catheterization in 2012 showed no obstructive disease.  In January 2019 she underwent an exercise stress test which showed fixed apical defects with some mild reversibility questionable attenuation.  Ejection fraction was 55%    She reports recent symptoms of funny heartbeats and feeling some chest pain in her left chest and radiating around to her left lateral chest wall.  At times it is fleeting at times lasting for up to a few seconds.  It is not related to exertion.    EKG on admission did not show acute ST or T wave segment abnormalities and her troponins have been negative    Review of Systems   Constitutional: Negative for chills and fever.   HENT: Negative for ear discharge and nosebleeds.    Eyes: Negative for discharge and redness.   Cardiovascular: Positive for chest pain. Negative for orthopnea, palpitations, paroxysmal nocturnal dyspnea and syncope.   Respiratory: Positive for shortness of breath. Negative for cough and wheezing.    Endocrine: Negative for heat intolerance.   Skin: Negative for rash.   Musculoskeletal: Positive for arthritis and joint pain. Negative for myalgias.   Gastrointestinal: Negative for abdominal pain, melena, nausea and vomiting.   Genitourinary: Negative for dysuria and hematuria.   Neurological: Negative for dizziness, light-headedness, numbness and tremors.   Psychiatric/Behavioral: Negative for depression. The patient is not nervous/anxious.        History  Past Medical History:   Diagnosis Date   • Aortic  stenosis, severe    • Arthritis    • Excessive daytime sleepiness    • Measles    • Migraine    • Mitral valve insufficiency     Mild to Moderate--S/p MVR   • Paroxysmal atrial fibrillation (CMS/HCC) 11/4/2016   • Thyroid disease    • Valvular heart disease 6/19/2019    AV replacement  Tissue 2/5/2013       Past Surgical History:   Procedure Laterality Date   • AORTIC VALVE REPAIR/REPLACEMENT  2/5/13- [facility]   • APPENDECTOMY     • BREAST BIOPSY     • CARDIAC CATHETERIZATION  08/24/12- [facility]   • HYSTERECTOMY     • REDUCTION MAMMAPLASTY     • STERNOTOMY  02/5/13- [facility]   • THYROID SURGERY         Family History   Problem Relation Age of Onset   • Dementia Mother    • Heart disease Mother    • Heart disease Father    • Cancer Sister    • Multiple sclerosis Sister    • Breast cancer Sister    • Dementia Maternal Grandmother    • Heart disease Maternal Grandfather    • Heart disease Paternal Grandmother    • Migraines Daughter        Social History     Tobacco Use   • Smoking status: Never Smoker   • Smokeless tobacco: Never Used   Substance Use Topics   • Alcohol use: Yes     Comment: Social   • Drug use: No        Medications Prior to Admission   Medication Sig Dispense Refill Last Dose   • amitriptyline (ELAVIL) 25 MG tablet Take 12.5 mg by mouth Every Night.      • aspirin 81 MG chewable tablet Chew 81 mg daily.      • atorvastatin (LIPITOR) 20 MG tablet TAKE ONE TABLET BY MOUTH DAILY 30 tablet 0    • cholecalciferol (VITAMIN D3) 1000 UNITS tablet Take 1,000 Units by mouth daily.      • metoprolol tartrate (LOPRESSOR) 50 MG tablet TAKE ONE TABLET BY MOUTH TWICE A  tablet 2    • multivitamin (Multi Vitamin Daily) tablet tablet Take 1 tablet by mouth Daily.   3/8/2021 at Unknown time   • Omega-3 Fatty Acids (FISH OIL) 1000 MG capsule capsule Take 1,000 mg by mouth Daily With Breakfast.   3/8/2021 at Unknown time   • sertraline (ZOLOFT) 50 MG tablet TAKE ONE TABLET BY MOUTH DAILY 30  "tablet 4 3/8/2021 at Unknown time   • topiramate (TOPAMAX) 100 MG tablet Take 1 tablet by mouth Daily. 90 tablet 3    • vitamin B-12 (CYANOCOBALAMIN) 500 MCG tablet Take 500 mcg by mouth daily.            Codeine    Scheduled Meds:amitriptyline, 12.5 mg, Oral, Nightly  aspirin, 81 mg, Oral, Daily  atorvastatin, 20 mg, Oral, Daily  enoxaparin, 40 mg, Subcutaneous, Q24H  sertraline, 50 mg, Oral, Daily  sodium chloride, 10 mL, Intravenous, Q12H  topiramate, 100 mg, Oral, Daily  vitamin B-12, 500 mcg, Oral, Daily      Continuous Infusions:   PRN Meds:.•  acetaminophen **OR** acetaminophen **OR** acetaminophen  •  Calcium Gluconate-NaCl **AND** calcium gluconate **AND** Calcium, Ionized  •  magnesium sulfate **OR** magnesium sulfate **OR** magnesium sulfate  •  melatonin  •  nitroglycerin  •  ondansetron **OR** ondansetron  •  potassium & sodium phosphates **OR** potassium & sodium phosphates  •  potassium chloride  •  potassium chloride  •  sodium chloride  •  sodium chloride    Objective     VITAL SIGNS  Vitals:    03/09/21 0234 03/09/21 0553 03/09/21 0933 03/09/21 1053   BP: 143/77 128/78 128/78 146/84   BP Location: Left arm Left arm  Left arm   Patient Position: Lying Lying  Sitting   Pulse: 64 69  91   Resp: 14 15  16   Temp: 97.9 °F (36.6 °C) 97.9 °F (36.6 °C)  97.7 °F (36.5 °C)   TempSrc: Oral Oral  Axillary   SpO2: 94% 95%  96%   Weight: 86.4 kg (190 lb 7.6 oz)  86.2 kg (190 lb)    Height:   167.6 cm (66\")        Flowsheet Rows      First Filed Value   Admission Height  167.6 cm (66\") Documented at 03/08/2021 1849   Admission Weight  85.3 kg (188 lb) Documented at 03/08/2021 1849          Body mass index is 30.67 kg/m².     TELEMETRY: SR occasional PVCs    Physical Exam:  Constitutional:       Appearance: Well-developed.   Eyes:      General: No scleral icterus.        Right eye: No discharge.   HENT:      Head: Normocephalic and atraumatic.   Neck:      Thyroid: No thyromegaly.      Lymphadenopathy: No cervical " adenopathy.   Pulmonary:      Effort: Pulmonary effort is normal. No respiratory distress.      Breath sounds: Normal breath sounds. No wheezing. No rales.   Cardiovascular:      Normal rate. Regular rhythm.      No gallop.   Abdominal:      Tenderness: There is no abdominal tenderness.   Skin:     Findings: No erythema or rash.   Neurological:      Mental Status: Alert and oriented to person, place, and time.          Results Review:   I reviewed the patient's new clinical results.    CBC    Results from last 7 days   Lab Units 03/09/21 0352 03/08/21 1928   WBC 10*3/mm3 4.20 4.70   HEMOGLOBIN g/dL 13.0 14.0   PLATELETS 10*3/mm3 171 183     BMP   Results from last 7 days   Lab Units 03/09/21 0352 03/08/21 2237 03/08/21 1928   SODIUM mmol/L 142  --  142   POTASSIUM mmol/L 3.8 3.7 3.7   CHLORIDE mmol/L 109*  --  109*   CO2 mmol/L 26.0  --  24.0   BUN mg/dL 20  --  19   CREATININE mg/dL 0.74  --  0.81   GLUCOSE mg/dL 103*  --  101*   MAGNESIUM mg/dL 1.9  --   --      Cr Clearance Estimated Creatinine Clearance: 64.1 mL/min (by C-G formula based on SCr of 0.74 mg/dL).  Coag     HbA1C No results found for: HGBA1C  Blood Glucose No results found for: POCGLU  Infection     CMP   Results from last 7 days   Lab Units 03/09/21 0352 03/08/21 2237 03/08/21 1928   SODIUM mmol/L 142  --  142   POTASSIUM mmol/L 3.8 3.7 3.7   CHLORIDE mmol/L 109*  --  109*   CO2 mmol/L 26.0  --  24.0   BUN mg/dL 20  --  19   CREATININE mg/dL 0.74  --  0.81   GLUCOSE mg/dL 103*  --  101*   ALBUMIN g/dL  --   --  4.10   BILIRUBIN mg/dL  --   --  0.3   ALK PHOS U/L  --   --  68   AST (SGOT) U/L  --   --  26   ALT (SGPT) U/L  --   --  33     ABG      UA      NAYELI  No results found for: POCMETH, POCAMPHET, POCBARBITUR, POCBENZO, POCCOCAINE, POCOPIATES, POCOXYCODO, POCPHENCYC, POCPROPOXY, POCTHC, POCTRICYC  Lysis Labs   Results from last 7 days   Lab Units 03/09/21  0352 03/08/21  1928   HEMOGLOBIN g/dL 13.0 14.0   PLATELETS 10*3/mm3 171 183      CREATININE mg/dL 0.74 0.81     Radiology(recent) XR Chest 1 View    Result Date: 3/8/2021    1.  Stable cardiomegaly. 2.  No acute cardiopulmonary disease.   Electronically Signed By-Brendon Archibald MD On:3/8/2021 8:03 PM This report was finalized on [id number] by  Brendon Archibald MD.      Results from last 7 days   Lab Units 03/08/21  2237   TROPONIN T ng/mL <0.010       Imaging Results (Last 24 Hours)     Procedure Component Value Units Date/Time    XR Chest 1 View [[id number]] Collected: 03/08/21 2001     Updated: 03/08/21 2005    Narrative:      XR CHEST 1 VW-     Date of Exam: 3/8/2021 7:38 PM     Indication: Chest pain protocol.     Comparison: 01/09/2019     Technique: A single view of the chest was obtained.     FINDINGS:      Patient is again noted be status post median sternotomy for aortic  valve replacement.  Mild cardiac leads stable.  Pulmonary vessels are  normal.  There are calcified mediastinal and hilar lymph nodes  consistent with changes of prior granulomatous disease.  There is a  calcified granuloma within the left lung base.  There is a curvilinear  density within the lateral left lung base compatible with area of  atelectasis or scarring.  This is unchanged from prior exam.  Lungs are  otherwise clear.  There are no pleural effusions.  No evidence  pneumothorax.             Impression:            1.  Stable cardiomegaly.  2.  No acute cardiopulmonary disease.        Electronically Signed By-Brendon Archibald MD On:3/8/2021 8:03 PM  This report was finalized on [id number] by  Brendon Archibald MD.          EKG          I personally viewed and interpreted the patient's EKG/Telemetry data:    ECHOCARDIOGRAM:      STRESS MYOVIEW:    CARDIAC CATHETERIZATION:    OTHER:         Assessment/Plan       Chest pain    Situational depression    Essential hypertension    Valvular heart disease    Mixed hyperlipidemia    Migraine headache    Obesity (BMI 30-39.9)      Assessment:    Chest Pain:    -MI  ruled out    -Abnormal EKG with anterior t-wave inversion and inferior q waves    -Normal Cardiac Catheterization 2012  Aortic Stenosis with previous bioprosthetic  AVR  Palpitations: Hx PVCs  HTN  Hx Migraines    Plan:     to review echo and stress myoview  Consider outpatient event monitor  Additional recommendations per Dr. Jimenez    Patient is seen and examined and findings are verified.  Patient denies any symptom of shortness of breath but she had chest pain described as a sharp on the left side of the chest with radiation to the left shoulder.    Hemodynamics are stable    Normal S1 and S2.  No pericardial rub or murmur chest is clear to auscultation.  No leg edema.    Her initial troponins are negative.  EKG is unremarkable.  Patient underwent stress test which was negative for ischemia.  Echocardiogram was pending.  Patient can be discharged and follow-up with Dr. Joy.  We shall follow as an outpatient    I discussed the patients findings and my recommendations with patient and RN    Electronically signed by Aly Jimenez MD, 03/09/21, 8:21 PM PRICILLA.      DERICK Doe  03/09/21  10:55 EST

## 2021-03-09 NOTE — PLAN OF CARE
Problem: Adult Inpatient Plan of Care  Goal: Plan of Care Review  Outcome: Ongoing, Progressing  Flowsheets (Taken 3/9/2021 0557)  Progress: improving  Plan of Care Reviewed With: patient  Outcome Summary: Patient has not c/o chest pain, and has been SR on the monitor, VSS, no other c/o through the night, will monitor  Goal: Patient-Specific Goal (Individualized)  Outcome: Ongoing, Progressing  Goal: Absence of Hospital-Acquired Illness or Injury  Outcome: Ongoing, Progressing  Intervention: Identify and Manage Fall Risk  Recent Flowsheet Documentation  Taken 3/9/2021 0506 by Glory Barajas, RN  Safety Promotion/Fall Prevention: safety round/check completed  Taken 3/9/2021 0323 by Glory Barajas, RN  Safety Promotion/Fall Prevention: safety round/check completed  Taken 3/9/2021 0110 by Glory Barajas, RN  Safety Promotion/Fall Prevention: safety round/check completed  Goal: Optimal Comfort and Wellbeing  Outcome: Ongoing, Progressing  Goal: Readiness for Transition of Care  Outcome: Ongoing, Progressing     Problem: Chest Pain  Goal: Resolution of Chest Pain Symptoms  Outcome: Ongoing, Progressing   Goal Outcome Evaluation:  Plan of Care Reviewed With: patient  Progress: improving  Outcome Summary: Patient has not c/o chest pain, and has been SR on the monitor, VSS, no other c/o through the night, will monitor

## 2021-03-09 NOTE — H&P
"      [facility] Medicine Services      Patient Name: Brooke Molina  : 1940  MRN: [id number]  Primary Care Physician: Seema Connell MD  Date of admission: 3/8/2021    Patient Care Team:  Seema Connell MD as PCP - General (Internal Medicine)          Subjective   History Present Illness     Chief Complaint:   Chief Complaint   Patient presents with   • Chest Pain     chest pain radiates down left arm, states feels weak.          Ms. Molina is a 80 y.o. female with past medical history of depression, obesity, hyperlipidemia, migraines, hypertension and valvular heart disease who presents to [facility] complaining of chest pain.  Patient reports that throughout the day on 3/8/2021 she has had an intermittent sensation of a \"slow throbbing pain\" in the left side of her chest that radiates around towards her left chest wall and back.  No obvious provoking or palliative factors are noted and patient rates pain at a 6/10 at its worst during these episodes which typically last for less than 1 minute before resolving spontaneously.  She reports some additional fatigue throughout the day as well as a decrease in appetite but denies any dyspnea, cough or fever, nausea or vomiting, sensation of tachycardia or palpitations, syncope or near syncope.  Bowel and bladder habits are reported as normal and patient reports that she does not smoke or drink alcohol.    In the ED patient did have a troponin less than 0.010, remainder of CBC and CMP was generally unremarkable.  Chest x-ray shows stable cardiomegaly with no acute cardiopulmonary disease noted.  EKG shows sinus rhythm at 88 with some diffuse T wave inversion.    History of Present Illness    Review of Systems   Constitutional: Positive for decreased appetite and malaise/fatigue. Negative for chills, diaphoresis and fever.   HENT: Negative.    Eyes: Negative.    Cardiovascular: Positive for chest pain. Negative for " dyspnea on exertion, irregular heartbeat, leg swelling, near-syncope, orthopnea, palpitations and syncope.   Respiratory: Negative.    Endocrine: Negative.    Skin: Negative.    Musculoskeletal: Negative.    Gastrointestinal: Negative.    Genitourinary: Negative.    Neurological: Negative.    Psychiatric/Behavioral: Negative.            Personal History     Past Medical History:   Past Medical History:   Diagnosis Date   • Aortic stenosis, severe    • Arthritis    • Excessive daytime sleepiness    • Measles    • Migraine    • Mitral valve insufficiency     Mild to Moderate--S/p MVR   • Paroxysmal atrial fibrillation (CMS/HCC) 11/4/2016   • Thyroid disease    • Valvular heart disease 6/19/2019    AV replacement  Tissue 2/5/2013       Surgical History:      Past Surgical History:   Procedure Laterality Date   • AORTIC VALVE REPAIR/REPLACEMENT  2/5/13- [facility]   • APPENDECTOMY     • BREAST BIOPSY     • CARDIAC CATHETERIZATION  08/24/12- [facility]   • HYSTERECTOMY     • REDUCTION MAMMAPLASTY     • STERNOTOMY  02/5/13- [facility]   • THYROID SURGERY             Family History: family history includes Breast cancer in her sister; Cancer in her sister; Dementia in her maternal grandmother and mother; Heart disease in her father, maternal grandfather, mother, and paternal grandmother; Migraines in her daughter; Multiple sclerosis in her sister. Otherwise pertinent FHx was reviewed and unremarkable.     Social History:  reports that she has never smoked. She has never used smokeless tobacco. She reports current alcohol use. She reports that she does not use drugs.      Medications:  Prior to Admission medications    Medication Sig Start Date End Date Taking? Authorizing Provider   amitriptyline (ELAVIL) 25 MG tablet Take 1 tablet by mouth Every Night.  Patient taking differently: Take 25 mg by mouth Every Night. Patient takes 1/2 tablet 6/4/20   Delvin Reed Jr., MD   aspirin 81 MG chewable tablet  Chew 81 mg daily.    Katarina Zamorano MD   atorvastatin (LIPITOR) 20 MG tablet TAKE ONE TABLET BY MOUTH DAILY 2/17/21   Seema Connell MD   butalbital-acetaminophen-caffeine (Esgic) -40 MG per tablet Take 1 tablet by mouth Every 6 (Six) Hours As Needed for Headache. 10/30/20   Javier Dias APRN   cholecalciferol (VITAMIN D3) 1000 UNITS tablet Take 1,000 Units by mouth daily.    Katarina Zamorano MD   methylPREDNISolone (MEDROL) 4 MG dose pack Take as directed on package instructions. 11/2/20   Seema Connell MD   metoprolol tartrate (LOPRESSOR) 50 MG tablet TAKE ONE TABLET BY MOUTH TWICE A DAY 1/7/21   RADHA Joy MD   Multiple Vitamin (MULTI VITAMIN DAILY PO) Take  by mouth.    Katarina Zamorano MD   Omega-3 Fatty Acids (FISH OIL) 1000 MG capsule capsule Take  by mouth Daily With Breakfast.    Katarina Zamorano MD   sertraline (ZOLOFT) 50 MG tablet TAKE ONE TABLET BY MOUTH DAILY 11/9/20   Seema Connell MD   topiramate (TOPAMAX) 100 MG tablet Take 1 tablet by mouth Daily. 6/4/20   Delvin Reed Jr., MD   valACYclovir (Valtrex) 1000 MG tablet Take 1 tablet by mouth 3 (Three) Times a Day. 11/2/20   Seema Connell MD   vitamin B-12 (CYANOCOBALAMIN) 500 MCG tablet Take 500 mcg by mouth daily.    Katarina Zamorano MD       Allergies:    Allergies   Allergen Reactions   • Codeine GI Intolerance       Objective   Objective     Vital Signs  Temp:  [97.3 °F (36.3 °C)] 97.3 °F (36.3 °C)  Heart Rate:  [73-93] 73  Resp:  [18] 18  BP: (147-162)/(58-69) 147/58  SpO2:  [98 %-99 %] 99 %  on   ;      Body mass index is 30.34 kg/m².    Physical Exam  Vitals reviewed.   Constitutional:       General: She is not in acute distress.     Appearance: Normal appearance. She is obese. She is not ill-appearing or toxic-appearing.   HENT:      Head: Normocephalic and atraumatic.      Right Ear: External ear normal.      Left Ear: External ear normal.      Nose: Nose  normal.      Mouth/Throat:      Mouth: Mucous membranes are moist.   Eyes:      Extraocular Movements: Extraocular movements intact.   Neck:      Comments: No JVD present  Cardiovascular:      Rate and Rhythm: Normal rate and regular rhythm.      Pulses: Normal pulses.      Heart sounds: Murmur present.   Pulmonary:      Effort: Pulmonary effort is normal.      Breath sounds: Normal breath sounds.   Abdominal:      General: Bowel sounds are normal. There is no distension.      Tenderness: There is no abdominal tenderness.   Musculoskeletal:         General: Normal range of motion.      Cervical back: Normal range of motion.   Skin:     General: Skin is warm and dry.   Neurological:      General: No focal deficit present.      Mental Status: She is alert and oriented to person, place, and time.   Psychiatric:         Mood and Affect: Mood normal.         Behavior: Behavior normal.         Thought Content: Thought content normal.         Judgment: Judgment normal.           Results Review:  I have personally reviewed most recent cardiac tracings, lab results and radiology images and interpretations and agree with findings, most notably: Troponin, CBC, CMP, chest x-ray and EKG.    Results from last 7 days   Lab Units 03/08/21  1928   WBC 10*3/mm3 4.70   HEMOGLOBIN g/dL 14.0   HEMATOCRIT % 40.5   PLATELETS 10*3/mm3 183     Results from last 7 days   Lab Units 03/08/21  1928   SODIUM mmol/L 142   POTASSIUM mmol/L 3.7   CHLORIDE mmol/L 109*   CO2 mmol/L 24.0   BUN mg/dL 19   CREATININE mg/dL 0.81   GLUCOSE mg/dL 101*   CALCIUM mg/dL 8.9   ALT (SGPT) U/L 33   AST (SGOT) U/L 26   TROPONIN T ng/mL <0.010     Estimated Creatinine Clearance: 63 mL/min (by C-G formula based on SCr of 0.81 mg/dL).  Brief Urine Lab Results     None          Microbiology Results (last 10 days)     ** No results found for the last 240 hours. **          ECG/EMG Results (most recent)     Procedure Component Value Units Date/Time    ECG 12 Lead  [[id number]] Collected: 03/08/21 1905     Updated: 03/08/21 1907     QT Interval 386 ms     Narrative:      HEART RATE= 88  bpm  RR Interval= 680  ms  NM Interval= 132  ms  P Horizontal Axis= 2  deg  P Front Axis= 46  deg  QRSD Interval= 95  ms  QT Interval= 386  ms  QRS Axis= -19  deg  T Wave Axis= -27  deg  - ABNORMAL ECG -  Sinus rhythm  Probable anterior infarct, age indeterminate  When compared with ECG of 09-Jan-2019 0:51:02,  Significant axis, voltage or hypertrophy change  Electronically Signed By:   Date and Time of Study: 2021-03-08 19:05:35              Results for orders placed during the hospital encounter of 01/08/20    Adult Transthoracic Echo Complete W/ Cont if Necessary Per Protocol    Interpretation Summary  · Left ventricular systolic function is normal.  · Left atrial cavity size is borderline dilated.  · Mild tricuspid valve regurgitation is present.    Technically satisfactory exam shows grossly normal chamber dimensions with satisfactory global contractility; LVEF 60%.  The aortic valve appears bioprosthetic with adequate opening and coaptation and no perivalvular leak.  Individual leaflets are not sufficiently visualized.  Mild to moderate TR with borderline increased RV systolic pressure quantitated 36 mmHg.      XR Chest 1 View    Result Date: 3/8/2021    1.  Stable cardiomegaly. 2.  No acute cardiopulmonary disease.   Electronically Signed By-Brendon Archibald MD On:3/8/2021 8:03 PM This report was finalized on [id number] by  Brendon Archibald MD.        Estimated Creatinine Clearance: 63 mL/min (by C-G formula based on SCr of 0.81 mg/dL).    Assessment/Plan   Assessment/Plan       Active Hospital Problems    Diagnosis  POA   • **Chest pain [R07.9]  Yes     Priority: High   • Essential hypertension [I10]  Yes     Priority: Medium   • Obesity (BMI 30-39.9) [E66.9]  Yes     Priority: Low   • Situational depression [F43.21]  Yes     Priority: Low   • Mixed hyperlipidemia [E78.2]  Yes      Priority: Low   • Migraine headache [G43.909]  Yes     Priority: Low      Resolved Hospital Problems   No resolved problems to display.     Chest pain  -Troponin: Less than 0.010, trend  -Chest x-ray shows stable cardiomegaly with no acute cardiopulmonary disease noted.    -EKG shows sinus rhythm at 88 with some diffuse T wave inversion.  -In the ED patient given 324 mg aspirin and Nitropaste  -Hold metoprolol  -Check lipid panel  -N.p.o. after midnight  -Stress test ordered  -Continue daily aspirin and cardiac monitoring    A. fib with history of valvular heart disease  -EKG shows sinus rhythm at 88 with some diffuse T wave inversion.  -Hold metoprolol temporarily as above  -Continue cardiac monitoring    Hypertension  -Poorly controlled with a blood pressure on admission of 162/69  -Hold metoprolol  -Hydralazine as needed  - Monitor while admitted    Hyperlipidemia  -Check lipid panel  -Continue statin    Depression  -Elavil and Zoloft    B12 deficiency  -Check B12  -Continue supplementation    History of migraines  -Continue Topamax  -Toradol and Fioricet as needed    Obesity (BMI: 30.34)  -Encourage diet lifestyle modifications            VTE Prophylaxis -Lovenox  Mechanical Order History:     None      Pharmalogical Order History:     None          CODE STATUS: Full  There are no questions and answers to display.         I discussed the patient's findings and my recommendations with patient.      Signature:Electronically signed by Misael Suarez PA-C, 03/08/21, 11:42 PM EST.    [facility] Hospitalist Team

## 2021-03-09 NOTE — ED PROVIDER NOTES
Subjective   History of Present Illness  Chest pain  80-year-old female with history of valvular heart disease had some left-sided chest pain that started this morning and has been intermittent throughout the day with varying intensity and duration.  She reports no relieving or exacerbating factors she states it does raise to left arm.  She reports no diaphoresis or palpitation or syncope  Review of Systems   Constitutional: Negative.    HENT: Negative.    Eyes: Negative.    Respiratory: Negative.    Cardiovascular: Positive for chest pain.   Gastrointestinal: Negative.    Genitourinary: Negative.    Musculoskeletal: Negative.    Skin: Negative.    Neurological: Negative.    Psychiatric/Behavioral: Negative.        Past Medical History:   Diagnosis Date   • Aortic stenosis, severe    • Arthritis    • Excessive daytime sleepiness    • Measles    • Migraine    • Mitral valve insufficiency     Mild to Moderate--S/p MVR   • Paroxysmal atrial fibrillation (CMS/HCC) 11/4/2016   • Thyroid disease    • Valvular heart disease 6/19/2019    AV replacement  Tissue 2/5/2013       Allergies   Allergen Reactions   • Codeine GI Intolerance       Past Surgical History:   Procedure Laterality Date   • AORTIC VALVE REPAIR/REPLACEMENT  2/5/13- [facility]   • APPENDECTOMY     • BREAST BIOPSY     • CARDIAC CATHETERIZATION  08/24/12- [facility]   • HYSTERECTOMY     • REDUCTION MAMMAPLASTY     • STERNOTOMY  02/5/13- [facility]   • THYROID SURGERY         Family History   Problem Relation Age of Onset   • Dementia Mother    • Heart disease Mother    • Heart disease Father    • Cancer Sister    • Multiple sclerosis Sister    • Breast cancer Sister    • Dementia Maternal Grandmother    • Heart disease Maternal Grandfather    • Heart disease Paternal Grandmother    • Migraines Daughter        Social History     Socioeconomic History   • Marital status:      Spouse name: Not on file   • Number of children: 2   • Years of  education: Not on file   • Highest education level: Not on file   Tobacco Use   • Smoking status: Never Smoker   • Smokeless tobacco: Never Used   Substance and Sexual Activity   • Alcohol use: Yes     Comment: Social   • Drug use: No   • Sexual activity: Defer     Birth control/protection: Surgical     Comment: Hyst     Prior to Admission medications    Medication Sig Start Date End Date Taking? Authorizing Provider   amitriptyline (ELAVIL) 25 MG tablet Take 1 tablet by mouth Every Night.  Patient taking differently: Take 25 mg by mouth Every Night. Patient takes 1/2 tablet 6/4/20   Delvin Reed Jr., MD   aspirin 81 MG chewable tablet Chew 81 mg daily.    ProviderKatarina MD   atorvastatin (LIPITOR) 20 MG tablet TAKE ONE TABLET BY MOUTH DAILY 2/17/21   Seema Connell MD   butalbital-acetaminophen-caffeine (Esgic) -40 MG per tablet Take 1 tablet by mouth Every 6 (Six) Hours As Needed for Headache. 10/30/20   Javier Dias APRN   cholecalciferol (VITAMIN D3) 1000 UNITS tablet Take 1,000 Units by mouth daily.    ProviderKatarina MD   methylPREDNISolone (MEDROL) 4 MG dose pack Take as directed on package instructions. 11/2/20   Seema Connell MD   metoprolol tartrate (LOPRESSOR) 50 MG tablet TAKE ONE TABLET BY MOUTH TWICE A DAY 1/7/21   RADHA Joy MD   Multiple Vitamin (MULTI VITAMIN DAILY PO) Take  by mouth.    Katarina Zamorano MD   Omega-3 Fatty Acids (FISH OIL) 1000 MG capsule capsule Take  by mouth Daily With Breakfast.    Katarina Zamorano MD   sertraline (ZOLOFT) 50 MG tablet TAKE ONE TABLET BY MOUTH DAILY 11/9/20   Seema Connell MD   topiramate (TOPAMAX) 100 MG tablet Take 1 tablet by mouth Daily. 6/4/20   Delvin Reed Jr., MD   valACYclovir (Valtrex) 1000 MG tablet Take 1 tablet by mouth 3 (Three) Times a Day. 11/2/20   Seema Connell MD   vitamin B-12 (CYANOCOBALAMIN) 500 MCG tablet Take 500 mcg by mouth daily.    Provider  "MD Katarina     /67   Pulse 72   Temp 97.3 °F (36.3 °C) (Oral)   Resp 18   Ht 167.6 cm (66\")   Wt 85.3 kg (188 lb)   SpO2 100%   BMI 30.34 kg/m²   I examined the patient using the appropriate personal protective equipment.          Objective   Physical Exam  General: Well-developed well-appearing, no acute distress, alert and appropriate  Eyes:  sclera nonicteric  HEENT: Mucous membranes moist, no mucosal swelling  Neck: Supple, no nuchal rigidity, no lymphadenopathy  Respirations: Respirations nonlabored, equal breath sounds bilaterally, clear lungs  Heart regular rate and rhythm, no murmurs rubs or gallops,   Abdomen soft nontender nondistended, no hepatosplenomegaly, no hernia, no mass, normal bowel sounds, no CVA tenderness  Extremities no clubbing cyanosis or edema, calves are symmetric and nontender  Neuro cranial nerves grossly intact, no focal limb deficits  Psych oriented, pleasant affect  Skin no rash, brisk cap refill  Procedures           ED Course      My EKG interpretation sinus rhythm, diffuse T wave abnormalities not significantly changed from previous, rate of 88     Results for orders placed or performed during the hospital encounter of 03/08/21   Comprehensive Metabolic Panel    Specimen: Blood   Result Value Ref Range    Glucose 101 (H) 65 - 99 mg/dL    BUN 19 8 - 23 mg/dL    Creatinine 0.81 0.57 - 1.00 mg/dL    Sodium 142 136 - 145 mmol/L    Potassium 3.7 3.5 - 5.2 mmol/L    Chloride 109 (H) 98 - 107 mmol/L    CO2 24.0 22.0 - 29.0 mmol/L    Calcium 8.9 8.6 - 10.5 mg/dL    Total Protein 7.0 6.0 - 8.5 g/dL    Albumin 4.10 3.50 - 5.20 g/dL    ALT (SGPT) 33 1 - 33 U/L    AST (SGOT) 26 1 - 32 U/L    Alkaline Phosphatase 68 39 - 117 U/L    Total Bilirubin 0.3 0.0 - 1.2 mg/dL    eGFR Non African Amer 68 >60 mL/min/1.73    Globulin 2.9 gm/dL    A/G Ratio 1.4 g/dL    BUN/Creatinine Ratio 23.5 7.0 - 25.0    Anion Gap 9.0 5.0 - 15.0 mmol/L   Troponin    Specimen: Blood   Result Value Ref " Range    Troponin T <0.010 0.000 - 0.030 ng/mL   CBC Auto Differential    Specimen: Blood   Result Value Ref Range    WBC 4.70 3.40 - 10.80 10*3/mm3    RBC 4.26 3.77 - 5.28 10*6/mm3    Hemoglobin 14.0 12.0 - 15.9 g/dL    Hematocrit 40.5 34.0 - 46.6 %    MCV 95.0 79.0 - 97.0 fL    MCH 32.9 26.6 - 33.0 pg    MCHC 34.6 31.5 - 35.7 g/dL    RDW 14.0 12.3 - 15.4 %    RDW-SD 47.3 37.0 - 54.0 fl    MPV 8.4 6.0 - 12.0 fL    Platelets 183 140 - 450 10*3/mm3    Neutrophil % 54.2 42.7 - 76.0 %    Lymphocyte % 33.5 19.6 - 45.3 %    Monocyte % 7.3 5.0 - 12.0 %    Eosinophil % 4.0 0.3 - 6.2 %    Basophil % 1.0 0.0 - 1.5 %    Neutrophils, Absolute 2.50 1.70 - 7.00 10*3/mm3    Lymphocytes, Absolute 1.60 0.70 - 3.10 10*3/mm3    Monocytes, Absolute 0.30 0.10 - 0.90 10*3/mm3    Eosinophils, Absolute 0.20 0.00 - 0.40 10*3/mm3    Basophils, Absolute 0.00 0.00 - 0.20 10*3/mm3    nRBC 0.1 0.0 - 0.2 /100 WBC   ECG 12 Lead   Result Value Ref Range    QT Interval 386 ms   Light Blue Top   Result Value Ref Range    Extra Tube hold for add-on    Green Top (Gel)   Result Value Ref Range    Extra Tube Hold for add-ons.    Lavender Top   Result Value Ref Range    Extra Tube hold for add-on      XR Chest 1 View    Result Date: 3/8/2021    1.  Stable cardiomegaly. 2.  No acute cardiopulmonary disease.   Electronically Signed By-Brendon Archibald MD On:3/8/2021 8:03 PM This report was finalized on [id number] by  Brendon Archibald MD.                                    MDM  Patient presents with a nonspecific chest pain.  EKG has some chronic T wave abnormalities that are unchanged.  Her initial troponin is normal.  She is not describing symptoms of DVT or dissection or pneumonia.  She does have moderate risk heart score as well as valvular heart disease.  She was ordered aspirin and nitroglycerin resting comfortably reexamination.  Hospital service was paged for discussion and patient will be placed hospitalization for further chest pain  evaluation.  Final diagnoses:   Chest pain, unspecified type            Lee Morgan MD  03/08/21 4231

## 2021-03-10 ENCOUNTER — TRANSITIONAL CARE MANAGEMENT TELEPHONE ENCOUNTER (OUTPATIENT)
Dept: CALL CENTER | Facility: HOSPITAL | Age: 81
End: 2021-03-10

## 2021-03-10 LAB
BH CV ECHO MEAS - AO MAX PG (FULL): 20.3 MMHG
BH CV ECHO MEAS - AO MAX PG: 22.6 MMHG
BH CV ECHO MEAS - AO MEAN PG (FULL): 11.4 MMHG
BH CV ECHO MEAS - AO MEAN PG: 12.8 MMHG
BH CV ECHO MEAS - AO ROOT AREA (BSA CORRECTED): 1.5
BH CV ECHO MEAS - AO ROOT AREA: 6.7 CM^2
BH CV ECHO MEAS - AO ROOT DIAM: 2.9 CM
BH CV ECHO MEAS - AO V2 MAX: 237.8 CM/SEC
BH CV ECHO MEAS - AO V2 MEAN: 168.1 CM/SEC
BH CV ECHO MEAS - AO V2 VTI: 57.4 CM
BH CV ECHO MEAS - AVA(I,A): 0.85 CM^2
BH CV ECHO MEAS - AVA(I,D): 0.85 CM^2
BH CV ECHO MEAS - AVA(V,A): 0.68 CM^2
BH CV ECHO MEAS - AVA(V,D): 0.68 CM^2
BH CV ECHO MEAS - BSA(HAYCOCK): 2 M^2
BH CV ECHO MEAS - BSA: 2 M^2
BH CV ECHO MEAS - BZI_BMI: 30.7 KILOGRAMS/M^2
BH CV ECHO MEAS - BZI_METRIC_HEIGHT: 167.6 CM
BH CV ECHO MEAS - BZI_METRIC_WEIGHT: 86.2 KG
BH CV ECHO MEAS - EDV(CUBED): 106.6 ML
BH CV ECHO MEAS - EDV(MOD-SP4): 68.1 ML
BH CV ECHO MEAS - EDV(TEICH): 104.5 ML
BH CV ECHO MEAS - EF(CUBED): 61.5 %
BH CV ECHO MEAS - EF(MOD-BP): 59 %
BH CV ECHO MEAS - EF(MOD-SP4): 59.4 %
BH CV ECHO MEAS - EF(TEICH): 53 %
BH CV ECHO MEAS - ESV(CUBED): 41 ML
BH CV ECHO MEAS - ESV(MOD-SP4): 27.6 ML
BH CV ECHO MEAS - ESV(TEICH): 49.1 ML
BH CV ECHO MEAS - FS: 27.3 %
BH CV ECHO MEAS - IVS/LVPW: 0.96
BH CV ECHO MEAS - IVSD: 1.1 CM
BH CV ECHO MEAS - LA DIMENSION(2D): 4.5 CM
BH CV ECHO MEAS - LV DIASTOLIC VOL/BSA (35-75): 34.8 ML/M^2
BH CV ECHO MEAS - LV MASS(C)D: 190.2 GRAMS
BH CV ECHO MEAS - LV MASS(C)DI: 97.2 GRAMS/M^2
BH CV ECHO MEAS - LV MAX PG: 2.3 MMHG
BH CV ECHO MEAS - LV MEAN PG: 1.4 MMHG
BH CV ECHO MEAS - LV SYSTOLIC VOL/BSA (12-30): 14.1 ML/M^2
BH CV ECHO MEAS - LV V1 MAX: 76.1 CM/SEC
BH CV ECHO MEAS - LV V1 MEAN: 57.6 CM/SEC
BH CV ECHO MEAS - LV V1 VTI: 23.1 CM
BH CV ECHO MEAS - LVIDD: 4.7 CM
BH CV ECHO MEAS - LVIDS: 3.4 CM
BH CV ECHO MEAS - LVOT AREA: 2.1 CM^2
BH CV ECHO MEAS - LVOT DIAM: 1.6 CM
BH CV ECHO MEAS - LVPWD: 1.1 CM
BH CV ECHO MEAS - MV A MAX VEL: 117.1 CM/SEC
BH CV ECHO MEAS - MV DEC SLOPE: 435.8 CM/SEC^2
BH CV ECHO MEAS - MV DEC TIME: 0.17 SEC
BH CV ECHO MEAS - MV E MAX VEL: 75.6 CM/SEC
BH CV ECHO MEAS - MV E/A: 0.65
BH CV ECHO MEAS - MV MAX PG: 5.7 MMHG
BH CV ECHO MEAS - MV MEAN PG: 2.4 MMHG
BH CV ECHO MEAS - MV V2 MAX: 119.5 CM/SEC
BH CV ECHO MEAS - MV V2 MEAN: 72 CM/SEC
BH CV ECHO MEAS - MV V2 VTI: 30.2 CM
BH CV ECHO MEAS - MVA(VTI): 1.6 CM^2
BH CV ECHO MEAS - PA MAX PG (FULL): 0.97 MMHG
BH CV ECHO MEAS - PA MAX PG: 4.6 MMHG
BH CV ECHO MEAS - PA V2 MAX: 106.5 CM/SEC
BH CV ECHO MEAS - PVA(V,A): 4.9 CM^2
BH CV ECHO MEAS - PVA(V,D): 4.9 CM^2
BH CV ECHO MEAS - QP/QS: 2.4
BH CV ECHO MEAS - RV MAX PG: 3.6 MMHG
BH CV ECHO MEAS - RV MEAN PG: 1.7 MMHG
BH CV ECHO MEAS - RV V1 MAX: 94.8 CM/SEC
BH CV ECHO MEAS - RV V1 MEAN: 58.6 CM/SEC
BH CV ECHO MEAS - RV V1 VTI: 21.1 CM
BH CV ECHO MEAS - RVDD: 2.7 CM
BH CV ECHO MEAS - RVOT AREA: 5.5 CM^2
BH CV ECHO MEAS - RVOT DIAM: 2.6 CM
BH CV ECHO MEAS - SI(AO): 195.3 ML/M^2
BH CV ECHO MEAS - SI(CUBED): 33.5 ML/M^2
BH CV ECHO MEAS - SI(LVOT): 25 ML/M^2
BH CV ECHO MEAS - SI(MOD-SP4): 20.7 ML/M^2
BH CV ECHO MEAS - SI(TEICH): 28.3 ML/M^2
BH CV ECHO MEAS - SV(AO): 382.1 ML
BH CV ECHO MEAS - SV(CUBED): 65.6 ML
BH CV ECHO MEAS - SV(LVOT): 49 ML
BH CV ECHO MEAS - SV(MOD-SP4): 40.5 ML
BH CV ECHO MEAS - SV(RVOT): 115.5 ML
BH CV ECHO MEAS - SV(TEICH): 55.4 ML
BH CV ECHO MEAS - TR MAX VEL: 256.9 CM/SEC
BH CV NUCLEAR PRIOR STUDY: 3
BH CV REST NUCLEAR ISOTOPE DOSE: 7.5 MCI
BH CV STRESS BP STAGE 1: NORMAL
BH CV STRESS BP STAGE 2: NORMAL
BH CV STRESS DURATION MIN STAGE 1: 3
BH CV STRESS DURATION MIN STAGE 2: 3
BH CV STRESS DURATION SEC STAGE 1: 0
BH CV STRESS DURATION SEC STAGE 2: 0
BH CV STRESS GRADE STAGE 1: 10
BH CV STRESS GRADE STAGE 2: 12
BH CV STRESS HR STAGE 1: 124
BH CV STRESS HR STAGE 2: 124
BH CV STRESS METS STAGE 1: 5
BH CV STRESS METS STAGE 2: 7.5
BH CV STRESS NUCLEAR ISOTOPE DOSE: 20.4 MCI
BH CV STRESS PROTOCOL 1: NORMAL
BH CV STRESS RECOVERY BP: NORMAL MMHG
BH CV STRESS RECOVERY HR: 112 BPM
BH CV STRESS SPEED STAGE 1: 1.7
BH CV STRESS SPEED STAGE 2: 2.5
BH CV STRESS STAGE 1: 1
BH CV STRESS STAGE 2: 2
LV EF NUC BP: 57 %
MAXIMAL PREDICTED HEART RATE: 142 BPM
PERCENT MAX PREDICTED HR: 87.32 %
STRESS BASELINE BP: NORMAL MMHG
STRESS BASELINE HR: 103 BPM
STRESS PERCENT HR: 103 %
STRESS POST ESTIMATED WORKLOAD: 3.9 METS
STRESS POST EXERCISE DUR MIN: 6 MIN
STRESS POST EXERCISE DUR SEC: 0 SEC
STRESS POST PEAK BP: NORMAL MMHG
STRESS POST PEAK HR: 124 BPM
STRESS TARGET HR: 121 BPM

## 2021-03-10 NOTE — OUTREACH NOTE
Prep Survey      Responses   [facility] patient discharged from?  [facility]   Is LACE score < 7 ?  Yes   Emergency Room discharge w/ pulse ox?  No   Eligibility  [facility]   Date of Admission  03/08/21   Date of Discharge  03/09/21   Discharge Disposition  Home or Self Care   Discharge diagnosis  chest pain resolved, HTN   Does the patient have one of the following disease processes/diagnoses(primary or secondary)?  Other   Does the patient have Home health ordered?  No   Is there a DME ordered?  No   Prep survey completed?  Yes          Yuki Contreras, TERI

## 2021-03-10 NOTE — OUTREACH NOTE
Call Center TCM Note      Responses   [facility] patient discharged from?  [facility]   Does the patient have one of the following disease processes/diagnoses(primary or secondary)?  Other   TCM attempt successful?  Yes   Discharge diagnosis  chest pain resolved, HTN   Meds reviewed with patient/caregiver?  Yes   Does the patient have all medications ordered at discharge?  N/A   Prescription comments  No changes made to pt meds   Does the patient have a primary care provider?   Yes   Does the patient have an appointment with their PCP within 7 days of discharge?  Yes   Comments regarding PCP  TCM FWP with PCP Fam Connell MD is 03/16/2021   Has the patient kept scheduled appointments due by today?  N/A   Has home health visited the patient within 72 hours of discharge?  N/A   Psychosocial issues?  No   Did the patient receive a copy of their discharge instructions?  Yes   Nursing interventions  Reviewed instructions with patient   What is the patient's perception of their health status since discharge?  Improving   Is the patient/caregiver able to teach back signs and symptoms related to disease process for when to call PCP?  Yes   Is the patient/caregiver able to teach back signs and symptoms related to disease process for when to call 911?  Yes   Is the patient/caregiver able to teach back the hierarchy of who to call/visit for symptoms/problems? PCP, Specialist, Home health nurse, Urgent Care, ED, 911  Yes   If the patient is a current smoker, are they able to teach back resources for cessation?  Not a smoker   TCM call completed?  Yes   Wrap up additional comments  Pt states she did not feel great last night or this morning, but has begun to feel better throughout today. Milder and more intermittent chest pain. No nausea, weakness. No med changes. TCM FWP with PCP Fam Connell MD is 03/16/2021          Argentina Nino MA    3/10/2021, 16:15 EST

## 2021-03-10 NOTE — PROGRESS NOTES
Case Management Discharge Note          Selected Continued Care - Discharged on 3/9/2021 Admission date: 3/8/2021 - Discharge disposition: Home or Self Care     Final Discharge Disposition Code: 01 - home or self-care

## 2021-03-11 LAB — QT INTERVAL: 386 MS

## 2021-03-16 ENCOUNTER — LAB (OUTPATIENT)
Dept: FAMILY MEDICINE CLINIC | Facility: CLINIC | Age: 81
End: 2021-03-16

## 2021-03-16 ENCOUNTER — OFFICE VISIT (OUTPATIENT)
Dept: FAMILY MEDICINE CLINIC | Facility: CLINIC | Age: 81
End: 2021-03-16

## 2021-03-16 VITALS
TEMPERATURE: 97.3 F | RESPIRATION RATE: 10 BRPM | SYSTOLIC BLOOD PRESSURE: 123 MMHG | HEART RATE: 74 BPM | BODY MASS INDEX: 31.27 KG/M2 | HEIGHT: 66 IN | DIASTOLIC BLOOD PRESSURE: 76 MMHG | WEIGHT: 194.6 LBS

## 2021-03-16 DIAGNOSIS — E55.9 VITAMIN D DEFICIENCY: ICD-10-CM

## 2021-03-16 DIAGNOSIS — R20.2 PARESTHESIA: ICD-10-CM

## 2021-03-16 DIAGNOSIS — Z76.89 ENCOUNTER FOR SUPPORT AND COORDINATION OF TRANSITION OF CARE: ICD-10-CM

## 2021-03-16 DIAGNOSIS — R07.89 OTHER CHEST PAIN: ICD-10-CM

## 2021-03-16 DIAGNOSIS — E04.1 THYROID NODULE: Primary | ICD-10-CM

## 2021-03-16 LAB
25(OH)D3 SERPL-MCNC: 71.2 NG/ML
T4 FREE SERPL-MCNC: 0.97 NG/DL (ref 0.93–1.7)
TSH SERPL DL<=0.05 MIU/L-ACNC: 1.09 UIU/ML (ref 0.27–4.2)

## 2021-03-16 PROCEDURE — 84443 ASSAY THYROID STIM HORMONE: CPT | Performed by: INTERNAL MEDICINE

## 2021-03-16 PROCEDURE — 82306 VITAMIN D 25 HYDROXY: CPT | Performed by: INTERNAL MEDICINE

## 2021-03-16 PROCEDURE — 99495 TRANSJ CARE MGMT MOD F2F 14D: CPT | Performed by: INTERNAL MEDICINE

## 2021-03-16 PROCEDURE — 84439 ASSAY OF FREE THYROXINE: CPT | Performed by: INTERNAL MEDICINE

## 2021-03-16 NOTE — PROGRESS NOTES
Transitional Care Follow Up Visit  Subjective     Brooke Molina is a 80 y.o. female who presents for a transitional care management visit.    Within 48 business hours after discharge our office contacted her via telephone to coordinate her care and needs.      I reviewed and discussed the details of that call along with the discharge summary, hospital problems, inpatient lab results, inpatient diagnostic studies, and consultation reports with Brooke.     Current outpatient and discharge medications have been reconciled for the patient.  Reviewed by: Seema Connell MD      Date of TCM Phone Call 3/9/2021   [facility]   Date of Admission 3/8/2021   Date of Discharge 3/9/2021   Discharge Disposition Home or Self Care     Risk for Readmission (LACE) Score: 4 (3/9/2021  6:01 AM)      History of Present Illness   Course During Hospital Stay:  Pt was admitted to [facility] for left sided chest pain That would shoot to the back- was more persistent that day than usual. Pt states feels more lazy than used to be- could sit in couch all day and feels like gets weaker with activity. Feet feel funny like they have socks on and they don't. Arms and legs full more heavy.    Had normal echo ( with aortic valve replacement) and stress test.  Reviewed all labs with pt today    Unsure if because was more home last year and didn't go/do things. neuologically good.  emotionally feels well. No depression or anxiety. Tried to get back walking but just can;t seem to stay with it.  On vD and V12.   Had both COVID vaccines      The following portions of the patient's history were reviewed and updated as appropriate: current medications, past family history, past medical history, past social history, past surgical history and problem list.r    Review of Systems    Objective   Physical Exam  Vitals and nursing note reviewed.   Constitutional:       Appearance: Normal appearance. She is well-developed.   HENT:      Head:  Normocephalic and atraumatic.      Right Ear: Tympanic membrane normal.      Left Ear: Tympanic membrane normal.      Nose: No rhinorrhea.      Mouth/Throat:      Pharynx: No posterior oropharyngeal erythema.   Eyes:      Pupils: Pupils are equal, round, and reactive to light.   Cardiovascular:      Rate and Rhythm: Normal rate and regular rhythm.      Pulses: Normal pulses.      Heart sounds: Normal heart sounds. No murmur heard.     Pulmonary:      Effort: Pulmonary effort is normal.      Breath sounds: Normal breath sounds.   Abdominal:      General: Bowel sounds are normal. There is no distension.      Palpations: Abdomen is soft.   Musculoskeletal:         General: No tenderness.      Cervical back: Normal range of motion and neck supple.   Skin:     Capillary Refill: Capillary refill takes less than 2 seconds.   Neurological:      Mental Status: She is alert and oriented to person, place, and time.   Psychiatric:         Mood and Affect: Mood normal.         Behavior: Behavior normal.         Assessment/Plan   Diagnoses and all orders for this visit:    1. Thyroid nodule (Primary)  Comments:  check labs  Orders:  -     TSH  -     T4, free    2. Vitamin D deficiency  -     Vitamin D 25 Hydroxy    3. Encounter for support and coordination of transition of care  Comments:  reviewed all results    4. Other chest pain  Assessment & Plan:   Doing  Well- no further symtpoms        5. Paresthesia  Comments:  check labs  Assessment & Plan:   Check labs today for vd and thyroid        They were informed of the diagnosis and treatment plan and directed to f/u for any further problems or concerns.

## 2021-03-29 RX ORDER — ATORVASTATIN CALCIUM 20 MG/1
TABLET, FILM COATED ORAL
Qty: 30 TABLET | Refills: 4 | Status: SHIPPED | OUTPATIENT
Start: 2021-03-29 | End: 2021-07-06

## 2021-04-22 ENCOUNTER — OFFICE VISIT (OUTPATIENT)
Dept: FAMILY MEDICINE CLINIC | Facility: CLINIC | Age: 81
End: 2021-04-22

## 2021-04-22 VITALS
DIASTOLIC BLOOD PRESSURE: 62 MMHG | SYSTOLIC BLOOD PRESSURE: 98 MMHG | OXYGEN SATURATION: 97 % | HEIGHT: 66 IN | BODY MASS INDEX: 31.6 KG/M2 | WEIGHT: 196.6 LBS | RESPIRATION RATE: 10 BRPM | HEART RATE: 70 BPM | TEMPERATURE: 97.1 F

## 2021-04-22 DIAGNOSIS — Z00.00 MEDICARE ANNUAL WELLNESS VISIT, SUBSEQUENT: ICD-10-CM

## 2021-04-22 DIAGNOSIS — I48.0 PAROXYSMAL ATRIAL FIBRILLATION (HCC): ICD-10-CM

## 2021-04-22 DIAGNOSIS — I10 ESSENTIAL HYPERTENSION: ICD-10-CM

## 2021-04-22 DIAGNOSIS — M85.89 OSTEOPENIA OF MULTIPLE SITES: ICD-10-CM

## 2021-04-22 DIAGNOSIS — Z13.820 ENCOUNTER FOR SCREENING FOR OSTEOPOROSIS: Primary | ICD-10-CM

## 2021-04-22 PROCEDURE — 99213 OFFICE O/P EST LOW 20 MIN: CPT | Performed by: INTERNAL MEDICINE

## 2021-04-22 PROCEDURE — 1159F MED LIST DOCD IN RCRD: CPT | Performed by: INTERNAL MEDICINE

## 2021-04-22 PROCEDURE — G0439 PPPS, SUBSEQ VISIT: HCPCS | Performed by: INTERNAL MEDICINE

## 2021-04-22 RX ORDER — CEPHALEXIN 500 MG/1
CAPSULE ORAL
COMMUNITY
Start: 2021-04-13 | End: 2021-08-07

## 2021-04-22 RX ORDER — CLINDAMYCIN PHOSPHATE 10 UG/ML
LOTION TOPICAL
COMMUNITY
Start: 2021-04-13 | End: 2022-02-15

## 2021-04-22 NOTE — PROGRESS NOTES
The ABCs of the Annual Wellness Visit  Subsequent Medicare Wellness Visit    Chief Complaint   Patient presents with   • Medicare Wellness-subsequent       Subjective   History of Present Illness:  Brooke Molina is a 80 y.o. female who presents for a Subsequent Medicare Wellness Visit and other concerns. BP low today- not dizzy  Or anything- more of a lazy day  Got COVID vaccines already - no issues    sleeping well with meds-   on topamax/elavil and headaches stable- no side effects from topamax. Will occasionally get aura and take ibuprofen and controls it. Had transient amensia from it- was very scared about CVA last time-   Had recent stress test- that was fine. Walks mile around neighborhood fine- but can't carry anything while walking  New rash for last 2 months seen banet on legs- on keflex and clindamycin lotion-  Getting worse  Hasn't had thyroid checked for awhile   Gets griselda horses at night    HEALTH RISK ASSESSMENT    Recent Hospitalizations:  Recently treated at the following:  [facility]     Current Medical Providers:  Patient Care Team:  Seema Connell MD as PCP - General (Internal Medicine)    Smoking Status:  Social History     Tobacco Use   Smoking Status Never Smoker   Smokeless Tobacco Never Used       Alcohol Consumption:  Social History     Substance and Sexual Activity   Alcohol Use Yes    Comment: Social       Depression Screen:   PHQ-2/PHQ-9 Depression Screening 4/22/2021   Little interest or pleasure in doing things 0   Feeling down, depressed, or hopeless 0   Total Score 0       Fall Risk Screen:  CLAYADI Fall Risk Assessment was completed, and patient is at LOW risk for falls.Assessment completed on:4/22/2021    Health Habits and Functional and Cognitive Screening:  Functional & Cognitive Status 4/22/2021   Do you have difficulty preparing food and eating? No   Do you have difficulty bathing yourself, getting dressed or grooming yourself? No   Do you have  difficulty using the toilet? No   Do you have difficulty moving around from place to place? No   Do you have trouble with steps or getting out of a bed or a chair? No   Current Diet Well Balanced Diet   Dental Exam Up to date   Eye Exam Not up to date   Exercise (times per week) 2 times per week   Current Exercise Activities Include Walking   Do you need help using the phone?  No   Are you deaf or do you have serious difficulty hearing?  No   Do you need help with transportation? No   Do you need help shopping? No   Do you need help preparing meals?  No   Do you need help with housework?  No   Do you need help with laundry? No   Do you need help taking your medications? No   Do you need help managing money? No   Do you ever drive or ride in a car without wearing a seat belt? No   Have you felt unusual stress, anger or loneliness in the last month? No   Who do you live with? Spouse   If you need help, do you have trouble finding someone available to you? No   Do you have difficulty concentrating, remembering or making decisions? No         Does the patient have evidence of cognitive impairment? No    Asprin use counseling:Taking ASA appropriately as indicated    Age-appropriate Screening Schedule:  Refer to the list below for future screening recommendations based on patient's age, sex and/or medical conditions. Orders for these recommended tests are listed in the plan section. The patient has been provided with a written plan.    Health Maintenance   Topic Date Due   • DXA SCAN  Never done   • TDAP/TD VACCINES (1 - Tdap) Never done   • ZOSTER VACCINE (1 of 2) Never done   • INFLUENZA VACCINE  08/01/2021   • LIPID PANEL  03/09/2022   • MAMMOGRAM  09/10/2022   • COLONOSCOPY  05/02/2027          The following portions of the patient's history were reviewed and updated as appropriate: allergies, current medications, past family history, past medical history, past social history, past surgical history and problem  list.    Outpatient Medications Prior to Visit   Medication Sig Dispense Refill   • amitriptyline (ELAVIL) 25 MG tablet Take 12.5 mg by mouth Every Night.     • aspirin 81 MG chewable tablet Chew 81 mg daily.     • atorvastatin (LIPITOR) 20 MG tablet TAKE ONE TABLET BY MOUTH DAILY 30 tablet 4   • cephalexin (KEFLEX) 500 MG capsule      • cholecalciferol (VITAMIN D3) 1000 UNITS tablet Take 1,000 Units by mouth daily.     • clindamycin (CLEOCIN T) 1 % lotion      • metoprolol tartrate (LOPRESSOR) 50 MG tablet TAKE ONE TABLET BY MOUTH TWICE A  tablet 2   • multivitamin (Multi Vitamin Daily) tablet tablet Take 1 tablet by mouth Daily.     • Omega-3 Fatty Acids (FISH OIL) 1000 MG capsule capsule Take 1,000 mg by mouth Daily With Breakfast.     • topiramate (TOPAMAX) 100 MG tablet Take 1 tablet by mouth Daily. 90 tablet 3   • vitamin B-12 (CYANOCOBALAMIN) 500 MCG tablet Take 500 mcg by mouth daily.     • sertraline (ZOLOFT) 50 MG tablet TAKE ONE TABLET BY MOUTH DAILY 30 tablet 3     No facility-administered medications prior to visit.       Patient Active Problem List   Diagnosis   • Migraine with aura and without status migrainosus, not intractable   • Transient cerebral ischemia   • Hyperlipidemia LDL goal <130   • DDD (degenerative disc disease), lumbosacral   • DJD (degenerative joint disease) of knee   • Laryngeal spasm   • GERD (gastroesophageal reflux disease)   • Situational depression   • Aortic insufficiency   • Carotid bruit   • Encounter for support and coordination of transition of care   • Essential hypertension   • Paroxysmal atrial fibrillation (CMS/HCC)   • Valvular heart disease   • Vitamin D deficiency   • Osteoarthritis   • Gastroesophageal reflux disease   • Mixed hyperlipidemia   • Migraine headache   • Temporary cerebral vascular dysfunction   • Headache   • Atypical migraine   • Paroxysmal atrial fibrillation (CMS/HCC)   • Palpitations   • Obesity (BMI 30-39.9)   • Chest pain   • Other chest  "pain   • Paresthesia   • Medicare annual wellness visit, subsequent       Advanced Care Planning:  ACP discussion was held with the patient during this visit. Patient has an advance directive in EMR which is still valid.     Review of Systems    Compared to one year ago, the patient feels her physical health is the same.  Compared to one year ago, the patient feels her mental health is the same.    Reviewed chart for potential of high risk medication in the elderly: yes  Reviewed chart for potential of harmful drug interactions in the elderly:yes    Objective         Vitals:    04/22/21 1433   BP: 98/62   Pulse: 70   Resp: 10   Temp: 97.1 °F (36.2 °C)   SpO2: 97%   Weight: 89.2 kg (196 lb 9.6 oz)   Height: 167.6 cm (66\")       Body mass index is 31.73 kg/m².  Discussed the patient's BMI with her. The BMI is above average; BMI management plan is completed.    Physical Exam  Vitals and nursing note reviewed.   Constitutional:       Appearance: Normal appearance. She is well-developed.   HENT:      Head: Normocephalic and atraumatic.      Right Ear: Tympanic membrane normal.      Left Ear: Tympanic membrane normal.      Nose: No rhinorrhea.      Mouth/Throat:      Pharynx: No posterior oropharyngeal erythema.   Eyes:      Pupils: Pupils are equal, round, and reactive to light.   Neck:      Comments: + goiter  Cardiovascular:      Rate and Rhythm: Normal rate and regular rhythm.      Pulses: Normal pulses.      Heart sounds: Normal heart sounds. No murmur heard.     Pulmonary:      Effort: Pulmonary effort is normal.      Breath sounds: Normal breath sounds.   Abdominal:      General: Bowel sounds are normal. There is no distension.      Palpations: Abdomen is soft.   Musculoskeletal:         General: No tenderness.      Cervical back: Normal range of motion and neck supple.   Skin:     Capillary Refill: Capillary refill takes less than 2 seconds.   Neurological:      Mental Status: She is alert and oriented to person, " place, and time.   Psychiatric:         Mood and Affect: Mood normal.         Behavior: Behavior normal.         Lab Results   Component Value Date    TRIG 70 03/09/2021    HDL 67 (H) 03/09/2021    LDL 89 03/09/2021    VLDL 13 03/09/2021        Assessment/Plan   Medicare Risks and Personalized Health Plan  CMS Preventative Services Quick Reference  Cardiovascular risk  Dementia/Memory   Fall Risk    The above risks/problems have been discussed with the patient.  Pertinent information has been shared with the patient in the After Visit Summary.  Follow up plans and orders are seen below in the Assessment/Plan Section.    Diagnoses and all orders for this visit:    1. Encounter for screening for osteoporosis (Primary)  -     DEXA Bone Density Axial; Future    2. Osteopenia of multiple sites  -     DEXA Bone Density Axial; Future    3. Essential hypertension    4. Paroxysmal atrial fibrillation (CMS/[facility])    5. Medicare annual wellness visit, subsequent  Assessment & Plan:  Discussed all recommendations      Other orders  -     sertraline (ZOLOFT) 50 MG tablet; Take 1 tablet by mouth Daily.  Dispense: 90 tablet; Refill: 3    Follow Up:  Return in about 6 months (around 10/22/2021), or if symptoms worsen or fail to improve.     An After Visit Summary and PPPS were given to the patient.

## 2021-04-24 PROBLEM — I42.9 CARDIOMYOPATHY (HCC): Status: RESOLVED | Noted: 2019-06-19 | Resolved: 2021-04-24

## 2021-04-24 PROBLEM — Z00.00 MEDICARE ANNUAL WELLNESS VISIT, SUBSEQUENT: Status: ACTIVE | Noted: 2021-04-24

## 2021-05-03 ENCOUNTER — HOSPITAL ENCOUNTER (OUTPATIENT)
Dept: BONE DENSITY | Facility: HOSPITAL | Age: 81
Discharge: HOME | End: 2021-05-03
Admitting: INTERNAL MEDICINE

## 2021-05-03 DIAGNOSIS — M85.89 OSTEOPENIA OF MULTIPLE SITES: ICD-10-CM

## 2021-05-03 DIAGNOSIS — Z13.820 ENCOUNTER FOR SCREENING FOR OSTEOPOROSIS: ICD-10-CM

## 2021-05-03 PROCEDURE — 77080 DXA BONE DENSITY AXIAL: CPT

## 2021-06-22 RX ORDER — TOPIRAMATE 100 MG/1
100 TABLET, FILM COATED ORAL DAILY
Qty: 90 TABLET | Refills: 3 | Status: SHIPPED | OUTPATIENT
Start: 2021-06-22 | End: 2022-06-14

## 2021-06-22 NOTE — TELEPHONE ENCOUNTER
Caller: Ly Molina    Relationship: Self    Best call back number: [phone]    Medication needed:   Requested Prescriptions     Pending Prescriptions Disp Refills   • topiramate (TOPAMAX) 100 MG tablet 90 tablet 3     Sig: Take 1 tablet by mouth Daily.       When do you need the refill by: NEXT TUESDAY    What additional details did the patient provide when requesting the medication: LY WOULD LIKE A 90 DAY SUPPLY    Does the patient have less than a 3 day supply:  [] Yes  [x] No    What is the patient's preferred pharmacy: [facility]

## 2021-07-06 RX ORDER — ATORVASTATIN CALCIUM 20 MG/1
TABLET, FILM COATED ORAL
Qty: 90 TABLET | Refills: 3 | Status: SHIPPED | OUTPATIENT
Start: 2021-07-06 | End: 2022-02-15

## 2021-07-08 RX ORDER — AMITRIPTYLINE HYDROCHLORIDE 25 MG/1
25 TABLET, FILM COATED ORAL NIGHTLY
Qty: 90 TABLET | Refills: 0 | Status: SHIPPED | OUTPATIENT
Start: 2021-07-08 | End: 2021-12-14

## 2021-07-12 ENCOUNTER — TELEPHONE (OUTPATIENT)
Dept: FAMILY MEDICINE CLINIC | Facility: CLINIC | Age: 81
End: 2021-07-12

## 2021-07-12 NOTE — TELEPHONE ENCOUNTER
Caller: Brooke Molina    Relationship to patient: Self    Best call back number: [phone]   Chief complaint:HEADACHE , NAUSEA     Type of visit: OFFICE    Requested date: SOONEST AVAILABLE        Additional notes:    POLINA PAULA IS SCHEDULED TO SEE DR. WHEAT ON 8/6/2021, SHE WOULD LIKE TO BE SOONER, IF POSSIBLE. SHE WOULD LIKE TO SEE DR. WHEAT BECAUSE SHE KNOWS HER HISTORY.

## 2021-08-06 ENCOUNTER — OFFICE VISIT (OUTPATIENT)
Dept: FAMILY MEDICINE CLINIC | Facility: CLINIC | Age: 81
End: 2021-08-06

## 2021-08-06 VITALS
SYSTOLIC BLOOD PRESSURE: 104 MMHG | BODY MASS INDEX: 32.14 KG/M2 | RESPIRATION RATE: 16 BRPM | TEMPERATURE: 96.8 F | OXYGEN SATURATION: 96 % | HEIGHT: 66 IN | HEART RATE: 71 BPM | DIASTOLIC BLOOD PRESSURE: 70 MMHG | WEIGHT: 200 LBS

## 2021-08-06 DIAGNOSIS — R51.9 NONINTRACTABLE EPISODIC HEADACHE, UNSPECIFIED HEADACHE TYPE: ICD-10-CM

## 2021-08-06 DIAGNOSIS — R20.2 PARESTHESIA: Primary | ICD-10-CM

## 2021-08-06 PROCEDURE — 99213 OFFICE O/P EST LOW 20 MIN: CPT | Performed by: INTERNAL MEDICINE

## 2021-08-06 NOTE — PROGRESS NOTES
"Rooming Tab(CC,VS,Pt Hx,Fall Screen)  Chief Complaint   Patient presents with   • Headache       Subjective   Pt here for headaches and neck pain-  Doing stretches- seen  PT- awakes tight for no reason   legs weaker- can't travel as much as can't walk much- trying to start doing exercises more- bike and treadmill some.   has fallen twice in last 4 months- foot caught on elevated walk- fell on face and injured front teeth. Second time tripped over vacuum cord    I have reviewed and updated her medications, medical history and problem list during today's office visit.     Patient Care Team:  Seema Connell MD as PCP - General (Internal Medicine)    Problem List Tab  Medications Tab  Synopsis Tab  Chart Review Tab  Care Everywhere Tab  Immunizations Tab  Patient History Tab    Social History     Tobacco Use   • Smoking status: Never Smoker   • Smokeless tobacco: Never Used   Substance Use Topics   • Alcohol use: Yes     Comment: Social       Review of Systems    Objective     Rooming Tab(CC,VS,Pt Hx,Fall Screen)  /70 (BP Location: Left arm)   Pulse 71   Temp 96.8 °F (36 °C) (Temporal)   Resp 16   Ht 167.6 cm (66\")   Wt 90.7 kg (200 lb)   SpO2 96%   BMI 32.28 kg/m²     Body mass index is 32.28 kg/m².    Physical Exam  Vitals and nursing note reviewed.   Constitutional:       Appearance: Normal appearance. She is well-developed.   HENT:      Head: Normocephalic and atraumatic.      Right Ear: Tympanic membrane normal.      Left Ear: Tympanic membrane normal.      Nose: No rhinorrhea.      Mouth/Throat:      Pharynx: No posterior oropharyngeal erythema.   Eyes:      Pupils: Pupils are equal, round, and reactive to light.   Cardiovascular:      Rate and Rhythm: Normal rate and regular rhythm.      Pulses: Normal pulses.      Heart sounds: Normal heart sounds. No murmur heard.     Pulmonary:      Effort: Pulmonary effort is normal.      Breath sounds: Normal breath sounds.   Abdominal:      General: " Bowel sounds are normal. There is no distension.      Palpations: Abdomen is soft.   Musculoskeletal:         General: No tenderness.      Cervical back: Normal range of motion and neck supple.   Skin:     Capillary Refill: Capillary refill takes less than 2 seconds.   Neurological:      Mental Status: She is alert and oriented to person, place, and time.   Psychiatric:         Mood and Affect: Mood normal.         Behavior: Behavior normal.          Statin Choice Calculator  Data Reviewed:                   Assessment/Plan   Order Review Tab  Health Maintenance Tab  Patient Plan/Order Tab  Diagnoses and all orders for this visit:    1. Paresthesia (Primary)    2. Nonintractable episodic headache, unspecified headache type        Wrapup Tab  Return if symptoms worsen or fail to improve.       They were informed of the diagnosis and treatment plan and directed to f/u for any further problems or concerns.

## 2021-09-29 ENCOUNTER — TELEPHONE (OUTPATIENT)
Dept: CARDIOLOGY | Facility: CLINIC | Age: 81
End: 2021-09-29

## 2021-09-29 RX ORDER — METOPROLOL TARTRATE 50 MG/1
50 TABLET, FILM COATED ORAL 2 TIMES DAILY
Qty: 60 TABLET | Refills: 0 | Status: SHIPPED | OUTPATIENT
Start: 2021-09-29 | End: 2021-10-05 | Stop reason: SDUPTHER

## 2021-09-29 NOTE — TELEPHONE ENCOUNTER
Needs a refill on metoprolol 50 mg sent to [facility] in [facility]  She has an appointment with Ilda on Tuesday

## 2021-10-05 ENCOUNTER — OFFICE VISIT (OUTPATIENT)
Dept: CARDIOLOGY | Facility: CLINIC | Age: 81
End: 2021-10-05

## 2021-10-05 VITALS
HEART RATE: 71 BPM | OXYGEN SATURATION: 98 % | SYSTOLIC BLOOD PRESSURE: 160 MMHG | BODY MASS INDEX: 31.82 KG/M2 | WEIGHT: 198 LBS | HEIGHT: 66 IN | DIASTOLIC BLOOD PRESSURE: 88 MMHG

## 2021-10-05 DIAGNOSIS — I10 ESSENTIAL HYPERTENSION: Primary | ICD-10-CM

## 2021-10-05 DIAGNOSIS — I48.0 PAROXYSMAL ATRIAL FIBRILLATION (HCC): ICD-10-CM

## 2021-10-05 DIAGNOSIS — I38 VALVULAR HEART DISEASE: ICD-10-CM

## 2021-10-05 PROBLEM — G43.009 ATYPICAL MIGRAINE: Status: RESOLVED | Noted: 2019-09-24 | Resolved: 2021-10-05

## 2021-10-05 PROBLEM — E78.2 MIXED HYPERLIPIDEMIA: Status: RESOLVED | Noted: 2019-06-19 | Resolved: 2021-10-05

## 2021-10-05 PROBLEM — R07.9 CHEST PAIN: Status: RESOLVED | Noted: 2021-03-08 | Resolved: 2021-10-05

## 2021-10-05 PROBLEM — Z00.00 MEDICARE ANNUAL WELLNESS VISIT, SUBSEQUENT: Status: RESOLVED | Noted: 2021-04-24 | Resolved: 2021-10-05

## 2021-10-05 PROBLEM — J38.5 LARYNGEAL SPASM: Status: RESOLVED | Noted: 2019-06-19 | Resolved: 2021-10-05

## 2021-10-05 PROCEDURE — 93000 ELECTROCARDIOGRAM COMPLETE: CPT | Performed by: NURSE PRACTITIONER

## 2021-10-05 PROCEDURE — 99213 OFFICE O/P EST LOW 20 MIN: CPT | Performed by: NURSE PRACTITIONER

## 2021-10-05 RX ORDER — MULTIVIT WITH MINERALS/LUTEIN
250 TABLET ORAL DAILY
COMMUNITY

## 2021-10-05 RX ORDER — METOPROLOL TARTRATE 50 MG/1
50 TABLET, FILM COATED ORAL 2 TIMES DAILY
Qty: 180 TABLET | Refills: 3 | Status: SHIPPED | OUTPATIENT
Start: 2021-10-05 | End: 2022-11-23 | Stop reason: SDUPTHER

## 2021-10-05 NOTE — PROGRESS NOTES
Cardiology Office Follow Up Visit      Primary Care Provider:  Seema Connell MD    Reason for f/u:     Aortic stenosis with previous tissue AVR in 2012  Hypertension  Paroxysmal atrial fibrillation      Subjective     CC:    Denies chest pain or dyspnea    History of Present Illness       Brooke Molina is a 80 y.o. female.  Sonia is a very pleasant 80-year-old female who has a history of aortic stenosis with previous tissue aortic valve replacement in 2012.  Cardiac catheterization done prior to her surgery showed no obstructive coronary artery disease.    During her hospitalization in March 2021 the patient had a stress Myoview that showed no evidence of reversible ischemia.  Echocardiogram done in March 2021 showed her ejection fraction to be 55 to 60%, mild to moderate concentric LVH, diastolic dysfunction and bioprosthetic aortic valve with appropriate function present.    She also has a past medical history of hypertension, paroxysmal atrial fibrillation and migraines    She denies any current or recent chest pain, dyspnea, PND, orthopnea, palpitations, near syncope, lower extremity edema or feelings of her heart racing.    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   • Excessive daytime sleepiness    • Hyperlipidemia    • Hypertension    • Measles    • Migraine    • Mitral valve insufficiency     Mild to Moderate--S/p MVR   • Paroxysmal atrial fibrillation (HCC) 11/4/2016   • Thyroid disease    • Valvular heart disease 6/19/2019    AV replacement  Tissue 2/5/2013       Past Surgical History:   Procedure Laterality Date   • AORTIC VALVE REPAIR/REPLACEMENT  2/5/13- [facility]   • APPENDECTOMY     • BREAST BIOPSY     • CARDIAC CATHETERIZATION  08/24/12- [facility]   • HYSTERECTOMY     • REDUCTION MAMMAPLASTY     • STERNOTOMY  02/5/13- [facility]   • THYROID  SURGERY           Current Outpatient Medications:   •  amitriptyline (ELAVIL) 25 MG tablet, Take 1 tablet by mouth Every Night., Disp: 90 tablet, Rfl: 0  •  aspirin 81 MG chewable tablet, Chew 81 mg daily., Disp: , Rfl:   •  atorvastatin (LIPITOR) 20 MG tablet, TAKE ONE TABLET BY MOUTH DAILY, Disp: 90 tablet, Rfl: 3  •  cholecalciferol (VITAMIN D3) 1000 UNITS tablet, Take 1,000 Units by mouth daily., Disp: , Rfl:   •  clindamycin (CLEOCIN T) 1 % lotion, , Disp: , Rfl:   •  metoprolol tartrate (LOPRESSOR) 50 MG tablet, Take 1 tablet by mouth 2 (Two) Times a Day., Disp: 180 tablet, Rfl: 3  •  multivitamin (Multi Vitamin Daily) tablet tablet, Take 1 tablet by mouth Daily., Disp: , Rfl:   •  Omega-3 Fatty Acids (FISH OIL) 1000 MG capsule capsule, Take 1,000 mg by mouth Daily With Breakfast., Disp: , Rfl:   •  sertraline (ZOLOFT) 50 MG tablet, Take 1 tablet by mouth Daily., Disp: 90 tablet, Rfl: 3  •  topiramate (TOPAMAX) 100 MG tablet, Take 1 tablet by mouth Daily., Disp: 90 tablet, Rfl: 3  •  vitamin B-12 (CYANOCOBALAMIN) 500 MCG tablet, Take 500 mcg by mouth daily., Disp: , Rfl:   •  vitamin C (ASCORBIC ACID) 250 MG tablet, Take 250 mg by mouth Daily., Disp: , Rfl:   •  Zinc Sulfate (ZINC 15 PO), Take  by mouth., Disp: , Rfl:     Social History     Socioeconomic History   • Marital status:      Spouse name: Not on file   • Number of children: 2   • Years of education: Not on file   • Highest education level: Not on file   Tobacco Use   • Smoking status: Never Smoker   • Smokeless tobacco: Never Used   Vaping Use   • Vaping Use: Never used   Substance and Sexual Activity   • Alcohol use: Yes     Comment: Social   • Drug use: No   • Sexual activity: Defer     Birth control/protection: Surgical     Comment: Hyst       Family History   Problem Relation Age of Onset   • Dementia Mother    • Heart disease Mother    • Heart disease Father    • Cancer Sister    • Multiple sclerosis Sister    • Breast cancer Sister    •  "Dementia Maternal Grandmother    • Heart disease Maternal Grandfather    • Heart disease Paternal Grandmother    • Migraines Daughter        The following portions of the patient's history were reviewed and updated as appropriate: allergies, current medications, past family history, past medical history, past social history, past surgical history and problem list.    Review of Systems   Constitutional: Negative for decreased appetite and diaphoresis.   HENT: Negative for congestion, hearing loss and nosebleeds.    Cardiovascular: Negative for chest pain, claudication, dyspnea on exertion, irregular heartbeat, leg swelling, near-syncope, orthopnea, palpitations, paroxysmal nocturnal dyspnea and syncope.   Respiratory: Negative for cough, shortness of breath and sleep disturbances due to breathing.    Endocrine: Negative for polyuria.   Hematologic/Lymphatic: Does not bruise/bleed easily.   Skin: Negative for itching and rash.   Musculoskeletal: Negative for back pain, muscle weakness and myalgias.   Gastrointestinal: Negative for abdominal pain, change in bowel habit and nausea.   Genitourinary: Negative for dysuria, flank pain, frequency and hesitancy.   Neurological: Negative for dizziness, tremors and weakness.   Psychiatric/Behavioral: Negative for altered mental status. The patient does not have insomnia.      /88   Pulse 71   Ht 167.6 cm (66\")   Wt 89.8 kg (198 lb)   SpO2 98%   BMI 31.96 kg/m² .  Objective     Vitals reviewed.   Constitutional:       General: Not in acute distress.     Appearance: Normal appearance. Well-developed.   Eyes:      Pupils: Pupils are equal, round, and reactive to light.   HENT:      Head: Normocephalic and atraumatic.   Neck:      Vascular: No JVD.   Pulmonary:      Effort: Pulmonary effort is normal.      Breath sounds: Normal breath sounds.   Cardiovascular:      Normal rate. Regular rhythm.   Pulses:     Intact distal pulses.   Edema:     Peripheral edema absent. "   Abdominal:      General: There is no distension.      Palpations: Abdomen is soft.      Tenderness: There is no abdominal tenderness.   Musculoskeletal: Normal range of motion.      Cervical back: Normal range of motion and neck supple. Skin:     General: Skin is warm and dry.   Neurological:      Mental Status: Alert and oriented to person, place, and time.             ECG 12 Lead    Date/Time: 10/5/2021 12:39 PM  Performed by: Ilda Goldman APRN  Authorized by: Ilda Goldman APRN   Comparison: not compared with previous ECG   Rhythm: sinus rhythm  BPM: 72  Conduction: conduction normal  T inversion: V1, V2 and V3    Clinical impression: abnormal EKG  Comments: Unchanged from previous EKG March 2021            EKG ordered by and reviewed by me in office         Diagnoses and all orders for this visit:    1. Essential hypertension (Primary)  Comments:  Blood pressure mildly elevated today though generally well controlled    2. Valvular heart disease  Comments:  History of aortic insufficiency with previous aortic valve replacement    3. Paroxysmal atrial fibrillation (HCC)  Comments:  Maintaining sinus rhythm    Other orders  -     metoprolol tartrate (LOPRESSOR) 50 MG tablet; Take 1 tablet by mouth 2 (Two) Times a Day.  Dispense: 180 tablet; Refill: 3           MEDICAL DECISION MAKING:           Patient has a history of aortic stenosis with previous bioprosthetic aortic valve replacement in 2012.  Recent echocardiogram shows stable valve function and preserved LV function.    Patient had recent noninvasive ischemic evaluation March 2021 that is reviewed and shows no reversible ischemia.    Her blood pressure is mildly elevated today though generally is well controlled.  Goal blood pressure would be systolic in the 130s or below and diastolic less than 80.  Patient will keep an eye on her blood pressure at home with monitoring.    She has had no recent palpitations or recurrence of any atrial arrhythmias  by clinical report.    She is in sinus rhythm today.    I have refilled her metoprolol as prescribed.    Last lipid profile was reviewed and LDL cholesterol was less than 100 HDL 67 total cholesterol 169.    I made no changes in her medicine we will continue the current treatment.  If the patient develops any new or worsening problems of asked her to contact the office sooner otherwise we will see her back for scheduled follow-up in 1 year

## 2021-12-14 RX ORDER — AMITRIPTYLINE HYDROCHLORIDE 25 MG/1
TABLET, FILM COATED ORAL
Qty: 90 TABLET | Refills: 0 | Status: SHIPPED | OUTPATIENT
Start: 2021-12-14 | End: 2022-06-07

## 2022-02-04 ENCOUNTER — TRANSCRIBE ORDERS (OUTPATIENT)
Dept: ADMINISTRATIVE | Facility: HOSPITAL | Age: 82
End: 2022-02-04

## 2022-02-04 DIAGNOSIS — Z12.31 VISIT FOR SCREENING MAMMOGRAM: Primary | ICD-10-CM

## 2022-02-15 ENCOUNTER — LAB (OUTPATIENT)
Dept: FAMILY MEDICINE CLINIC | Facility: CLINIC | Age: 82
End: 2022-02-15

## 2022-02-15 ENCOUNTER — OFFICE VISIT (OUTPATIENT)
Dept: FAMILY MEDICINE CLINIC | Facility: CLINIC | Age: 82
End: 2022-02-15

## 2022-02-15 VITALS
BODY MASS INDEX: 33.2 KG/M2 | SYSTOLIC BLOOD PRESSURE: 110 MMHG | HEART RATE: 81 BPM | OXYGEN SATURATION: 99 % | HEIGHT: 66 IN | DIASTOLIC BLOOD PRESSURE: 76 MMHG | WEIGHT: 206.6 LBS | TEMPERATURE: 96 F | RESPIRATION RATE: 16 BRPM

## 2022-02-15 DIAGNOSIS — E78.41 ELEVATED LIPOPROTEIN(A): ICD-10-CM

## 2022-02-15 DIAGNOSIS — I73.9 PAD (PERIPHERAL ARTERY DISEASE): ICD-10-CM

## 2022-02-15 DIAGNOSIS — I48.0 PAROXYSMAL ATRIAL FIBRILLATION: ICD-10-CM

## 2022-02-15 DIAGNOSIS — G43.009 ATYPICAL MIGRAINE: Primary | ICD-10-CM

## 2022-02-15 PROBLEM — M19.019 OSTEOARTHROSIS, LOCALIZED, PRIMARY, INVOLVING SHOULDER REGION: Status: ACTIVE | Noted: 2021-08-17

## 2022-02-15 PROBLEM — M48.03 SPINAL STENOSIS OF CERVICOTHORACIC REGION: Status: ACTIVE | Noted: 2022-02-15

## 2022-02-15 PROCEDURE — 36415 COLL VENOUS BLD VENIPUNCTURE: CPT | Performed by: INTERNAL MEDICINE

## 2022-02-15 PROCEDURE — 80053 COMPREHEN METABOLIC PANEL: CPT | Performed by: INTERNAL MEDICINE

## 2022-02-15 PROCEDURE — 99214 OFFICE O/P EST MOD 30 MIN: CPT | Performed by: INTERNAL MEDICINE

## 2022-02-15 PROCEDURE — 80061 LIPID PANEL: CPT | Performed by: INTERNAL MEDICINE

## 2022-02-15 RX ORDER — ONDANSETRON 4 MG/1
4 TABLET, ORALLY DISINTEGRATING ORAL EVERY 8 HOURS PRN
Qty: 45 TABLET | Refills: 1 | Status: SHIPPED | OUTPATIENT
Start: 2022-02-15

## 2022-02-15 NOTE — PROGRESS NOTES
Rooming Tab(CC,VS,Pt Hx,Fall Screen)  Chief Complaint   Patient presents with   • Headache   • Vomiting       Subjective   Pt here for headache- been going on for months/years- had multiple tests and diagnoses with atypical migraine- was in clusters more- had physiotherapy and helped- sees ENT as well. Headaches coming back- spoke to ortho- and checked xrays of spine- was significant arthritis with mild scolosis- now with MRI that showed significant as well- to get facet block tomorrow. Had steroid shot in glut and helped last     The patient presents today for evaluation of a headache.    Headaches   The patient reports that her history of atypical migraines began approximately 3 years ago. She states that they came in clusters for a while but have since become more frequent. She states that she feels a constant sickness of nausea and fatigue, and this bothers her more than her headaches. She also complains of neck tightness and neck pain. She was referred to physiotherapy by Dr. Mcgill for her neck which has improved her migraines to an extent. She is going to physiotherapy twice weekly at present. She is also following with Dr. Reina for her knee. She mentioned to him her neck pain and he ordered x-rays of her cervical and thoracic spine. Her x-rays revealed that she has scoliosis with a curvature of 15 degrees and severe arthritis. She was told that her scoliosis is not severe enough for surgery. She states that there was no evidence of stenosis as far as she knows. The patient reports that Dr. Reina thinks her neck pain is being caused by a combination of scoliosis and arthritis, causing her neck to be relatively immobile, and in turn causing her migraines. She notes that when she first began having migraines her neck pain was not severe. She reports her neck pain now is awful. She reports that Dr. Reina is still unsure what is causing her to feel sick all the time. She states that she does not vomit but she is  "constantly nauseous and fatigued. She reports she got up early today, went to physiotherapy, and had to sleep for 2 more hours when she got home. The patient reports that she sleeps much more than she used to, and she does not feel like doing anything anymore. She reports that she has had some neck pain relief with oral steroids and a steroid injection. However, the relief was only temporary, and her pain returned with vengeance. She reports Dr. Reina also ordered an MRI which she had done last week in [place], and she is scheduled to get a facet injection tomorrow at 3:00 PM.      Leg weakness and shortness of breath.   The patient reports that her legs become weak easily and she gets short of breath with household chores. She reports that her legs became weak today after walking from her car to the elevator and she had to take a minute to recover. She states that she can still go to the mall and grocery store but is too weak to clean her house. She states that she can still walk and do exercises. She states she does not get out of breath badly and describes her shortness of breath as \"short little episodes\". She denies that her legs feel cold and denies burning in her legs. She does not have any heart racing associated with her symptoms. She states she can tell when she is in atrial fibrillation and does not think that her symptoms are associated with that. She reports that she follows up on her heart and valves regularly. Her last echocardiogram was done in 03/2021.     Cholesterol.   The patient questions if her cholesterol has increased as she does not take her cholesterol medication daily. She states that she was trying to take her medication every other day but feels that her weakness is worse on the days that she takes it. She reports she last took her medication last night. She has not eaten anything today.     Blood pressure.   The patient states that she takes her blood pressure medication every once " "in a while. She denies that her blood pressure gets super low. Her systolic blood pressure today in clinic is 110 mmHg. She reports that her systolic blood pressure is usually 120 to 130 mmHg.     Arthritis.   She is not taking any arthritis medication.     Topamax.   She reports that her Topamax was increased to 100 mg a day after her last transient amnesia episode. She questions if she needs this high of a dose as she has only had 5 transient amnesia episodes in the last 20 years.     Depression.   She reports that her children think she is sleeping all the time because she is depressed and also question if she needs medication for anxiety. She notes that she understands why her children are concerned as she never used to take naps. She states that she does not feel depressed or anxious. She states that she is taking her Zoloft and it works well for her.       I have reviewed and updated her medications, medical history and problem list during today's office visit.     Patient Care Team:  Seema Connell MD as PCP - General (Internal Medicine)    Problem List Tab  Medications Tab  Synopsis Tab  Chart Review Tab  Care Everywhere Tab  Immunizations Tab  Patient History Tab    Social History     Tobacco Use   • Smoking status: Never Smoker   • Smokeless tobacco: Never Used   Substance Use Topics   • Alcohol use: Yes     Comment: Social       Review of Systems     A review of systems was performed, and the pertinent positives are noted in the HPI.     Objective     Rooming Tab(CC,VS,Pt Hx,Fall Screen)  /76 (BP Location: Right arm, Patient Position: Sitting, Cuff Size: Adult)   Pulse 81   Temp 96 °F (35.6 °C) (Temporal)   Resp 16   Ht 167.6 cm (65.98\")   Wt 93.7 kg (206 lb 9.6 oz)   SpO2 99%   BMI 33.36 kg/m²     Body mass index is 33.36 kg/m².    Physical Exam  Nursing note reviewed. Vitals reviewed: Pulse present but difficult to feel.   Constitutional:       Appearance: Normal appearance. She is " well-developed.   HENT:      Head: Normocephalic and atraumatic.      Right Ear: Tympanic membrane normal.      Left Ear: Tympanic membrane normal.      Nose: No rhinorrhea.      Mouth/Throat:      Pharynx: No posterior oropharyngeal erythema.   Eyes:      Pupils: Pupils are equal, round, and reactive to light.   Cardiovascular:      Rate and Rhythm: Normal rate and regular rhythm.      Pulses: Normal pulses.      Heart sounds: Normal heart sounds. No murmur heard.      Pulmonary:      Effort: Pulmonary effort is normal.      Breath sounds: Normal breath sounds.   Abdominal:      General: Bowel sounds are normal. There is no distension.      Palpations: Abdomen is soft.   Musculoskeletal:         General: Tenderness present.      Cervical back: Normal range of motion and neck supple.   Skin:     Capillary Refill: Capillary refill takes less than 2 seconds.   Neurological:      Mental Status: She is alert and oriented to person, place, and time.   Psychiatric:         Mood and Affect: Mood normal.         Behavior: Behavior normal.          Statin Choice Calculator  Data Reviewed:    Mammo Screening Digital Tomosynthesis Bilateral With CAD    Result Date: 2/18/2022  Impression: Benign findings. No mammographic evidence of malignancy. Recommend routine mammographic screening.  BI-RADS ASSESSMENT: BI-RADS 2. Benign findings.  The patient's information is entered into a computerized reminder system with a targeted due date for the next mammogram.  Note:  It has been reported that there is approximately a 15% false negative in mammography.  Therefore, management of a palpable abnormality should not be deferred because of a negative mammogram.  Patients with mammographically dense breasts will be notified by mail, according to [place] law.  We believe these women should undergo risk assessment, taking into consideration breast density and other factors, including but not limited to, family history of breast cancer and other  malignancies and personal history of breast cancer or high risk lesions.  Women who are found to have lifetime risk of greater than 20-25% may benefit from additional screening, such as with Breast MRI.   Electronically Signed By-Victoriano Dale MD On:2/18/2022 12:06 PM This report was finalized on [id number] by  Victoriano Dale MD.      The data below has been reviewed by Seema Connell MD on 02/15/2022.      Lab Results   Component Value Date    BUN 21 02/15/2022    CREATININE 0.72 02/15/2022    EGFRIFNONA 78 02/15/2022     02/15/2022    K 4.1 02/15/2022     02/15/2022    CALCIUM 9.7 02/15/2022    ALBUMIN 4.40 02/15/2022    BILITOT 0.3 02/15/2022    ALKPHOS 74 02/15/2022    AST 17 02/15/2022    ALT 22 02/15/2022    TRIG 114 02/15/2022    HDL 76 (H) 02/15/2022    VLDL 20 02/15/2022     (H) 02/15/2022    LDLHDL 1.96 02/15/2022      Assessment/Plan   Order Review Tab  Health Maintenance Tab  Patient Plan/Order Tab  Diagnoses and all orders for this visit:    1. Atypical migraine (Primary)        -  She will continue physical therapy.        -  We will obtain a copy of her MRI to see what is going on.        -  She will get her facet injection as scheduled. I am hopeful this will make a significant difference in her pain.     2. Paroxysmal atrial fibrillation (HCC)  -     Lipid Panel  -     Comprehensive Metabolic Panel  -  Her echocardiogram done in 03/2021 looked good. No signs of heart failure.     3. PAD (peripheral artery disease) (HCC)  -     US Ankle / Brachial Indices Extremity Complete; Future    4. Elevated lipoprotein(a)  -     Lipid Panel    If she does well with her injection, we will wean down on her Topamax and see if it helps with her nausea.  We will follow up on her depression after her injection as her energy level may improve if her pain improves.     Other orders  -     ondansetron ODT (Zofran ODT) 4 MG disintegrating tablet; Place 1 tablet on the tongue Every 8 (Eight) Hours  As Needed for Nausea or Vomiting.  Dispense: 45 tablet; Refill: 1        Wrapup Tab  Return if symptoms worsen or fail to improve.       They were informed of the diagnosis and treatment plan and directed to f/u for any further problems or concerns.        Patient's Body mass index is 33.36 kg/m². indicating that she is obese (BMI >30). Obesity-related health conditions include the following: hypertension and coronary heart disease. Obesity is unchanged. BMI is is above average; BMI management plan is completed. We discussed portion control and increasing exercise..       Transcribed from ambient dictation for Seema Connell MD by Lulú Chirinos.   02/17/22   13:25 EST    Patient verbalized consent to the visit recording.  I have personally performed the services described in this document as transcribed by the above individual, and it is both accurate and complete.  Seema Connell MD  2/26/2022  21:20 EST

## 2022-02-16 DIAGNOSIS — I73.9 PAD (PERIPHERAL ARTERY DISEASE): Primary | ICD-10-CM

## 2022-02-16 LAB
ALBUMIN SERPL-MCNC: 4.4 G/DL (ref 3.5–5.2)
ALBUMIN/GLOB SERPL: 1.8 G/DL
ALP SERPL-CCNC: 74 U/L (ref 39–117)
ALT SERPL W P-5'-P-CCNC: 22 U/L (ref 1–33)
ANION GAP SERPL CALCULATED.3IONS-SCNC: 5.4 MMOL/L (ref 5–15)
AST SERPL-CCNC: 17 U/L (ref 1–32)
BILIRUB SERPL-MCNC: 0.3 MG/DL (ref 0–1.2)
BUN SERPL-MCNC: 21 MG/DL (ref 8–23)
BUN/CREAT SERPL: 29.2 (ref 7–25)
CALCIUM SPEC-SCNC: 9.7 MG/DL (ref 8.6–10.5)
CHLORIDE SERPL-SCNC: 106 MMOL/L (ref 98–107)
CHOLEST SERPL-MCNC: 248 MG/DL (ref 0–200)
CO2 SERPL-SCNC: 30.6 MMOL/L (ref 22–29)
CREAT SERPL-MCNC: 0.72 MG/DL (ref 0.57–1)
GFR SERPL CREATININE-BSD FRML MDRD: 78 ML/MIN/1.73
GLOBULIN UR ELPH-MCNC: 2.4 GM/DL
GLUCOSE SERPL-MCNC: 100 MG/DL (ref 65–99)
HDLC SERPL-MCNC: 76 MG/DL (ref 40–60)
LDLC SERPL CALC-MCNC: 152 MG/DL (ref 0–100)
LDLC/HDLC SERPL: 1.96 {RATIO}
POTASSIUM SERPL-SCNC: 4.1 MMOL/L (ref 3.5–5.2)
PROT SERPL-MCNC: 6.8 G/DL (ref 6–8.5)
SODIUM SERPL-SCNC: 142 MMOL/L (ref 136–145)
TRIGL SERPL-MCNC: 114 MG/DL (ref 0–150)
VLDLC SERPL-MCNC: 20 MG/DL (ref 5–40)

## 2022-02-18 ENCOUNTER — HOSPITAL ENCOUNTER (OUTPATIENT)
Dept: MAMMOGRAPHY | Facility: HOSPITAL | Age: 82
Discharge: HOME | End: 2022-02-18
Admitting: INTERNAL MEDICINE

## 2022-02-18 DIAGNOSIS — Z12.31 VISIT FOR SCREENING MAMMOGRAM: ICD-10-CM

## 2022-02-18 PROCEDURE — 77067 SCR MAMMO BI INCL CAD: CPT

## 2022-02-18 PROCEDURE — 77063 BREAST TOMOSYNTHESIS BI: CPT

## 2022-02-21 RX ORDER — EZETIMIBE 10 MG/1
10 TABLET ORAL DAILY
Qty: 30 TABLET | Refills: 3 | Status: SHIPPED | OUTPATIENT
Start: 2022-02-21 | End: 2022-12-20

## 2022-02-26 PROBLEM — I73.9 PAD (PERIPHERAL ARTERY DISEASE): Status: ACTIVE | Noted: 2022-02-26

## 2022-02-26 PROBLEM — E78.41 ELEVATED LIPOPROTEIN(A): Status: ACTIVE | Noted: 2022-02-26

## 2022-03-09 ENCOUNTER — TELEPHONE (OUTPATIENT)
Dept: FAMILY MEDICINE CLINIC | Facility: CLINIC | Age: 82
End: 2022-03-09

## 2022-03-09 NOTE — TELEPHONE ENCOUNTER
Caller: Brooke Molina    Relationship to patient: Self    Best call back number: [phone]    Patient is needing:     PATIENT HAD SEVERE HEADACHE, SEVERE CHEST PAIN AND HIGH BLOOD PRESSURE, 188/109    SHE SAID THIS LASTED ABOUT 20 MINUTES AND SHE WAS UNABLE TO DRIVE     SHE SAID SHE ALMOST HAD AN EPISODE OF URINARY INCONTINENCE WITH IT, THAT HAS NOT HAPPENED BEFORE TO HER     SHE SAID IT WAS AROUND 7 PM ON 03/08/22    SHE SAID AFTER THE BLOOD PRESSURE CAME BACK DOWN AND THE PAIN SUBSIDED SHE FELT NORMAL AGAIN, SHE DID NOT TAKE ANY MEDICATION TO CAUSE THAT     SHE HAD A PROCEDURE ON HER SPINE LAST Friday, 03/04 AND SHE SPOKE TO THAT DOCTOR AND THEY DIDN'T THINK THIS EPISODE WOULD HAVE BEEN RELATED TO THAT PROCEDURE     SHE SAID SHE SPOKE TO HER CARDIOLOGIST TODAY AS WELL AND SHE SAID SHE SHOULD GET IN TO SEE DR. WHEAT FIRST BECAUSE OF THE SCOPE OF THE SYMPTOMS, SHE SAID THE CARDIOLOGIST DIDN'T FEEL IT WAS A HEART ATTACK     HUB ADVISED THAT IF IT HAPPENS AGAIN TO HAVE SOMEONE OR CALL 911

## 2022-03-10 ENCOUNTER — OFFICE VISIT (OUTPATIENT)
Dept: FAMILY MEDICINE CLINIC | Facility: CLINIC | Age: 82
End: 2022-03-10

## 2022-03-10 VITALS
SYSTOLIC BLOOD PRESSURE: 162 MMHG | DIASTOLIC BLOOD PRESSURE: 84 MMHG | OXYGEN SATURATION: 99 % | RESPIRATION RATE: 16 BRPM | HEIGHT: 66 IN | TEMPERATURE: 96.8 F | BODY MASS INDEX: 32.3 KG/M2 | WEIGHT: 201 LBS | HEART RATE: 80 BPM

## 2022-03-10 DIAGNOSIS — R00.2 PALPITATIONS: Primary | ICD-10-CM

## 2022-03-10 DIAGNOSIS — G43.C1 INTRACTABLE PERIODIC HEADACHE SYNDROME: ICD-10-CM

## 2022-03-10 DIAGNOSIS — I10 ESSENTIAL HYPERTENSION: ICD-10-CM

## 2022-03-10 PROCEDURE — 99214 OFFICE O/P EST MOD 30 MIN: CPT | Performed by: INTERNAL MEDICINE

## 2022-03-10 PROCEDURE — 93000 ELECTROCARDIOGRAM COMPLETE: CPT | Performed by: INTERNAL MEDICINE

## 2022-03-10 NOTE — TELEPHONE ENCOUNTER
I left patient a voicemail this morning and went ahead and scheduled her. I let her know in the voicemail that I would place her on schedule so the appt would not get taken and to give us a call back and ask for you or me.

## 2022-03-10 NOTE — PROGRESS NOTES
"Rooming Tab(CC,VS,Pt Hx,Fall Screen)  Chief Complaint   Patient presents with   • Chest Pain   • Headache   • Hypertension       Subjective   3/8 around 2200 - was coming home from "[employer] club sitting in her car and suddenly had a headache that was bilateral without visual changes. Patient has atypical migraines and since the episode she has had more migranes. On topamax and amytriptyline as well as physiotherapy and cervical nerve blocks.     Entire upper thorax and back pain and radiated to the inner arms on both sides - described as \"excruciating\". Started to subside after 3-4 minutes. Realized at this time she almost had an episode of urinary incontinence but did not. By the time she got home she was completely back to normal. Has had aortic valve replacement and paroxysmal atrial fibrillation and felt tachycardic on the way home. By the time she got home she took BP and it was 188/110 and HR 92. - BP fell on it's own at home. Called Dr. Espino s/p cervial nerve block - thought not associated with block. Called cardiologist who recommended seeing PCP.      Patient unsure if she had dyspnea during the event. no numbness, tingling, weakness noted, but she does state that pain was so severe she didn't want to move. No loss of consciousness/syncope or confusion during episode. No changes in vision. No residual pain. Denies palpitations.     I have reviewed and updated her medications, medical history and problem list during today's office visit.     Patient Care Team:  Seema Connell MD as PCP - General (Internal Medicine)    Problem List Tab  Medications Tab  Synopsis Tab  Chart Review Tab  Care Everywhere Tab  Immunizations Tab  Patient History Tab    Social History     Tobacco Use   • Smoking status: Never Smoker   • Smokeless tobacco: Never Used   Substance Use Topics   • Alcohol use: Yes     Comment: Social       Review of Systems   Constitutional: Negative for activity change, appetite change, fatigue and " "fever.   HENT: Negative for congestion, rhinorrhea and sore throat.    Eyes: Negative for visual disturbance.   Respiratory: Positive for chest tightness (with episode). Negative for cough.    Cardiovascular: Positive for chest pain (with episode).   Gastrointestinal: Negative for abdominal pain, constipation, diarrhea, nausea and vomiting.   Genitourinary: Positive for urgency (one episode during event). Negative for dysuria and frequency.   Skin: Negative for pallor and rash.   Neurological: Positive for headache (migraines). Negative for confusion.         ECG 12 Lead    Date/Time: 3/10/2022 11:39 AM  Performed by: Seema Connell MD  Authorized by: Seema Connell MD   Comparison: compared with previous ECG from 10/5/2021  Comparison to previous ECG: Rhythm: sinus rhythm  BPM: 72  Conduction: conduction normal  T inversion: V1, V2 and V3    Rhythm: sinus rhythm  Rate: normal  Conduction: conduction normal  T inversion: V1, V2 and V3  QRS axis: normal    Clinical impression: normal ECG            Objective     Rooming Tab(CC,VS,Pt Hx,Fall Screen)  /84   Pulse 80   Temp 96.8 °F (36 °C) (Temporal)   Resp 16   Ht 167.6 cm (65.98\")   Wt 91.2 kg (201 lb)   SpO2 99%   BMI 32.46 kg/m²     Body mass index is 32.46 kg/m².    Physical Exam  Vitals and nursing note reviewed.   Constitutional:       General: She is not in acute distress.     Appearance: Normal appearance. She is well-developed. She is obese.   HENT:      Head: Normocephalic and atraumatic.      Right Ear: Tympanic membrane and external ear normal.      Left Ear: Tympanic membrane and external ear normal.      Nose: Nose normal. No congestion or rhinorrhea.      Mouth/Throat:      Mouth: Mucous membranes are moist.      Pharynx: Oropharynx is clear. No oropharyngeal exudate or posterior oropharyngeal erythema.   Eyes:      Conjunctiva/sclera: Conjunctivae normal.      Pupils: Pupils are equal, round, and reactive to light.   Neck:     "  Comments: Small firm mobile L cervical lymph node at site of injection   Cardiovascular:      Rate and Rhythm: Normal rate and regular rhythm.      Pulses: Normal pulses.      Heart sounds: Murmur heard.   Pulmonary:      Effort: Pulmonary effort is normal.      Breath sounds: Normal breath sounds.   Abdominal:      General: Bowel sounds are normal. There is no distension.      Palpations: Abdomen is soft.   Musculoskeletal:         General: No tenderness.      Cervical back: Normal range of motion and neck supple.   Skin:     General: Skin is warm.      Capillary Refill: Capillary refill takes less than 2 seconds.   Neurological:      Mental Status: She is alert and oriented to person, place, and time.   Psychiatric:         Mood and Affect: Mood normal.         Behavior: Behavior normal.            Statin Choice Calculator  Data Reviewed:    Mammo Screening Digital Tomosynthesis Bilateral With CAD    Result Date: 2/18/2022  Impression: Benign findings. No mammographic evidence of malignancy. Recommend routine mammographic screening.  BI-RADS ASSESSMENT: BI-RADS 2. Benign findings.  The patient's information is entered into a computerized reminder system with a targeted due date for the next mammogram.  Note:  It has been reported that there is approximately a 15% false negative in mammography.  Therefore, management of a palpable abnormality should not be deferred because of a negative mammogram.  Patients with mammographically dense breasts will be notified by mail, according to [place] law.  We believe these women should undergo risk assessment, taking into consideration breast density and other factors, including but not limited to, family history of breast cancer and other malignancies and personal history of breast cancer or high risk lesions.  Women who are found to have lifetime risk of greater than 20-25% may benefit from additional screening, such as with Breast MRI.   Electronically Signed By-Victoriano Dale MD  On:2/18/2022 12:06 PM This report was finalized on [id number] by  Victoriano Dale MD.      The data below has been reviewed by Seema Connell MD on 03/10/2022.      Lab Results   Component Value Date    BUN 21 02/15/2022    CREATININE 0.72 02/15/2022    EGFRIFNONA 78 02/15/2022     02/15/2022    K 4.1 02/15/2022     02/15/2022    CALCIUM 9.7 02/15/2022    ALBUMIN 4.40 02/15/2022    BILITOT 0.3 02/15/2022    ALKPHOS 74 02/15/2022    AST 17 02/15/2022    ALT 22 02/15/2022    TRIG 114 02/15/2022    HDL 76 (H) 02/15/2022    VLDL 20 02/15/2022     (H) 02/15/2022    LDLHDL 1.96 02/15/2022      Assessment/Plan      Order Review Tab  Health Maintenance Tab  Patient Plan/Order Tab  Diagnoses and all orders for this visit:    1. Palpitations (Primary)  Comments:  EKG unchanged- understands if happens again would get cardiac testing. could be from recent injection in left side of neck- will talk to pain management at next  Orders:  -     ECG 12 Lead  -     ECG 12 Lead  -     TSH    2. Intractable periodic headache syndrome    3. Essential hypertension        Wrapup Tab  Return if symptoms worsen or fail to improve.       They were informed of the diagnosis and treatment plan and directed to f/u for any further problems or concerns.      I have seen and examined Brooke Molina with resident and agree with findings and assessment and plan    This document has been electronically signed by Seema Connell MD on March 12, 2022 20:44 EST

## 2022-03-11 ENCOUNTER — HOSPITAL ENCOUNTER (OUTPATIENT)
Dept: CARDIOLOGY | Facility: HOSPITAL | Age: 82
Discharge: HOME | End: 2022-03-11
Admitting: INTERNAL MEDICINE

## 2022-03-11 DIAGNOSIS — I73.9 PAD (PERIPHERAL ARTERY DISEASE): ICD-10-CM

## 2022-03-11 LAB
BH CV LOWER ARTERIAL LEFT ABI RATIO: 1.19
BH CV LOWER ARTERIAL LEFT DORSALIS PEDIS SYS MAX: 196
BH CV LOWER ARTERIAL LEFT GREAT TOE SYS MAX: 160
BH CV LOWER ARTERIAL LEFT POST TIBIAL SYS MAX: 179
BH CV LOWER ARTERIAL LEFT TBI RATIO: 0.97
BH CV LOWER ARTERIAL RIGHT ABI RATIO: 1.24
BH CV LOWER ARTERIAL RIGHT DORSALIS PEDIS SYS MAX: 204
BH CV LOWER ARTERIAL RIGHT GREAT TOE SYS MAX: 158
BH CV LOWER ARTERIAL RIGHT POST TIBIAL SYS MAX: 191
BH CV LOWER ARTERIAL RIGHT TBI RATIO: 0.96
MAXIMAL PREDICTED HEART RATE: 141 BPM
STRESS TARGET HR: 120 BPM
UPPER ARTERIAL LEFT ARM BRACHIAL SYS MAX: 165
UPPER ARTERIAL RIGHT ARM BRACHIAL SYS MAX: 160

## 2022-03-11 PROCEDURE — 93922 UPR/L XTREMITY ART 2 LEVELS: CPT

## 2022-04-26 ENCOUNTER — TELEPHONE (OUTPATIENT)
Dept: FAMILY MEDICINE CLINIC | Facility: CLINIC | Age: 82
End: 2022-04-26

## 2022-04-26 NOTE — TELEPHONE ENCOUNTER
Caller: Brooke Molina    Relationship to patient: Self    Best call back number: [phone]    Chief complaint: EPISODE OF WEAKNESS, ESPECIALLY IN LEGS    Type of visit: OFFICE, SAME DAY    Requested date: ASAP    If rescheduling, when is the original appointment: N/A     Additional notes:PATIENT CALLED AND SAID SHE WOULD LIKE TO SEE IF DR. WHEAT CAN WORK HER IN  SOONER THAN 05/10/22

## 2022-05-05 ENCOUNTER — OFFICE VISIT (OUTPATIENT)
Dept: FAMILY MEDICINE CLINIC | Facility: CLINIC | Age: 82
End: 2022-05-05

## 2022-05-05 VITALS
OXYGEN SATURATION: 97 % | DIASTOLIC BLOOD PRESSURE: 72 MMHG | RESPIRATION RATE: 16 BRPM | TEMPERATURE: 96.8 F | WEIGHT: 200 LBS | SYSTOLIC BLOOD PRESSURE: 116 MMHG | BODY MASS INDEX: 32.14 KG/M2 | HEART RATE: 82 BPM | HEIGHT: 66 IN

## 2022-05-05 DIAGNOSIS — R29.898 WEAKNESS OF BOTH LEGS: Primary | ICD-10-CM

## 2022-05-05 PROCEDURE — 99214 OFFICE O/P EST MOD 30 MIN: CPT | Performed by: INTERNAL MEDICINE

## 2022-05-05 RX ORDER — CLINDAMYCIN PHOSPHATE 10 UG/ML
LOTION TOPICAL
COMMUNITY
Start: 2022-04-15 | End: 2022-12-20

## 2022-05-05 RX ORDER — AMOXICILLIN 500 MG/1
CAPSULE ORAL
COMMUNITY
Start: 2022-04-14 | End: 2022-05-05

## 2022-05-05 NOTE — PROGRESS NOTES
Rooming Tab(CC,VS,Pt Hx,Fall Screen)  Chief Complaint   Patient presents with   • Extremity Weakness       Subjective      Wellness  The patient reports that her appetite has improved. She eats a [employer] frozen dinners often, and also drinks Boost. She takes women's Centrum as well. She is not currently taking cough syrup or nausea medication. She did have a mammogram done in 01/2022 this year. She denies any chest pain. She denies any bowel or bladder issues. The patient lives alone and does not have a life alert, but is contacted daily by her next door neighbor. She smokes a half a pack of cigarettes a day.     Depression  The patient is currently taking 20 mg dosage of her depression medication and is managing her symptoms well.     Cyst  The patient reports that she has had a cyst for quite some time, but it recently appears to have gotten bigger. The cyst was drained and was not cancerous.     Chronic lymphoid leukemia  The patient's recent blood work shows and improvement in her platelet count. Dr. Jimenez has prescribed Rituximab. She last met with Dr. Jimenez in 01/2022. Her appetite has improved.     Atrial fibrillation  The patient denies any recent palpitations. She states that she wears an apple watch that monitors her heart rate. She will follow-up with cardiology in 07/2022.     Medication management  The patient is not currently taking Eliquis.     Immunizations   The patient received her fourth COVID-19 injection on 04/07/2022. She is unsure if she has had the pneumonia vaccine.     I have reviewed and updated her medications, medical history and problem list during today's office visit.     Patient Care Team:  Seema Connell MD as PCP - General (Internal Medicine)    Problem List Tab  Medications Tab  Synopsis Tab  Chart Review Tab  Care Everywhere Tab  Immunizations Tab  Patient History Tab    Social History     Tobacco Use   • Smoking status: Never Smoker   • Smokeless tobacco: Never  "Used   Substance Use Topics   • Alcohol use: Yes     Comment: Social       Review of Systems  A review of systems was performed, and the pertinent positives are noted in the HPI.    Objective     Rooming Tab(CC,VS,Pt Hx,Fall Screen)  /72 (BP Location: Left arm, Patient Position: Sitting, Cuff Size: Adult)   Pulse 82   Temp 96.8 °F (36 °C) (Infrared)   Resp 16   Ht 167.6 cm (66\")   Wt 90.7 kg (200 lb)   SpO2 97%   BMI 32.28 kg/m²     Body mass index is 32.28 kg/m².    Physical Exam  Vitals and nursing note reviewed.   Constitutional:       Appearance: Normal appearance. She is well-developed.   HENT:      Head: Normocephalic and atraumatic.      Right Ear: Tympanic membrane normal.      Left Ear: Tympanic membrane normal.      Nose: No rhinorrhea.      Mouth/Throat:      Pharynx: No posterior oropharyngeal erythema.   Eyes:      Pupils: Pupils are equal, round, and reactive to light.   Cardiovascular:      Rate and Rhythm: Normal rate and regular rhythm.      Pulses: Normal pulses.      Heart sounds: Normal heart sounds. No murmur heard.  Pulmonary:      Effort: Pulmonary effort is normal.      Breath sounds: Normal breath sounds.   Abdominal:      General: Bowel sounds are normal. There is no distension.      Palpations: Abdomen is soft.   Musculoskeletal:         General: Tenderness present.      Cervical back: Normal range of motion and neck supple.   Skin:     Capillary Refill: Capillary refill takes less than 2 seconds.   Neurological:      Mental Status: She is alert and oriented to person, place, and time.      Motor: Weakness present.   Psychiatric:         Mood and Affect: Mood normal.         Behavior: Behavior normal.          Statin Choice Calculator  Data Reviewed:         The data below has been reviewed by Seema Connell MD on 05/05/2022.          Assessment & Plan   Order Review Tab  Health Maintenance Tab  Patient Plan/Order Tab  Diagnoses and all orders for this visit:    1. " Weakness of both legs (Primary)  Comments:  known DDD will get  MRI  Assessment & Plan:  - I will order an MRI of her lower back.  - I advised the patient to sleep on her side, and put a pillow between her knees to help open up the back and hips.   - I recommend that the patient use a cane to help her get around. I advised her not to drive if she is having muscle weakness in her legs.      Orders:  -     MRI Lumbar Spine Without Contrast; Future      Wrapup Tab  Return if symptoms worsen or fail to improve.       They were informed of the diagnosis and treatment plan and directed to f/u for any further problems or concerns.        Transcribed from ambient dictation for Seema Connell MD by Lucy Carrero.  05/05/22   19:02 EDT    Patient verbalized consent to the visit recording.

## 2022-05-05 NOTE — ASSESSMENT & PLAN NOTE
- I will order an MRI of her lower back.  - I advised the patient to sleep on her side, and put a pillow between her knees to help open up the back and hips.   - I recommend that the patient use a cane to help her get around. I advised her not to drive if she is having muscle weakness in her legs.

## 2022-06-03 ENCOUNTER — HOSPITAL ENCOUNTER (OUTPATIENT)
Dept: MRI IMAGING | Facility: HOSPITAL | Age: 82
Discharge: HOME | End: 2022-06-03
Admitting: INTERNAL MEDICINE

## 2022-06-03 DIAGNOSIS — R29.898 WEAKNESS OF BOTH LEGS: ICD-10-CM

## 2022-06-03 PROCEDURE — 72148 MRI LUMBAR SPINE W/O DYE: CPT

## 2022-06-07 RX ORDER — AMITRIPTYLINE HYDROCHLORIDE 25 MG/1
TABLET, FILM COATED ORAL
Qty: 90 TABLET | Refills: 0 | Status: SHIPPED | OUTPATIENT
Start: 2022-06-07 | End: 2022-11-07

## 2022-06-09 NOTE — PROGRESS NOTES
Spoke with patient she said she read what was on mychart. She doesn't have any pain just that her legs are very weak. She said she is going to see orthopedic surgeon today.

## 2022-06-14 RX ORDER — TOPIRAMATE 100 MG/1
TABLET, FILM COATED ORAL
Qty: 90 TABLET | Refills: 0 | Status: SHIPPED | OUTPATIENT
Start: 2022-06-14 | End: 2022-09-12 | Stop reason: SDUPTHER

## 2022-06-20 ENCOUNTER — TELEPHONE (OUTPATIENT)
Dept: FAMILY MEDICINE CLINIC | Facility: CLINIC | Age: 82
End: 2022-06-20

## 2022-06-20 NOTE — TELEPHONE ENCOUNTER
Caller: Ly Molina    Relationship: Self    Best call back number: [phone]       What is the best time to reach you: ANYTIME    Who are you requesting to speak with (clinical staff, provider,  specific staff member): CLINICAL     Do you know the name of the person who called: LY     What was the call regarding: LY SEEN AT URGENT CARE  , FOR AN INSECT BITE FROM 1 MONTH AGO, SHE WAS GIVEN ANTIBIOTICS, TOPICAL CREAM FOR 10 DAYS. IT IS PIMPLE SIZE,  WIDER  WITH RED [place] AROUND AREA. SHE WAS SEEN AGAIN AT URGENT CARE AGAIN  6/20/2022 TO SEE IF IT CAN BE TREATED . TOLD THEY DID NOT KNOW WHAT IT IS , BUT RECOMMENDED SEEING DR. WHEAT OR DERMATOLOGIST . SHE WOULD LIKE TO KNOW WHAT DR. WHEAT SUGGEST.    Do you require a callback:YES

## 2022-06-21 ENCOUNTER — OFFICE VISIT (OUTPATIENT)
Dept: FAMILY MEDICINE CLINIC | Facility: CLINIC | Age: 82
End: 2022-06-21

## 2022-06-21 VITALS
BODY MASS INDEX: 31.66 KG/M2 | OXYGEN SATURATION: 99 % | DIASTOLIC BLOOD PRESSURE: 82 MMHG | WEIGHT: 197 LBS | SYSTOLIC BLOOD PRESSURE: 130 MMHG | HEIGHT: 66 IN | HEART RATE: 74 BPM | RESPIRATION RATE: 18 BRPM | TEMPERATURE: 96.8 F
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
## 2022-06-21 DIAGNOSIS — L08.9 INFECTED INSECT BITE OF LEFT UPPER EXTREMITY, INITIAL ENCOUNTER: Primary | ICD-10-CM

## 2022-06-21 DIAGNOSIS — S40.862A INFECTED INSECT BITE OF LEFT UPPER EXTREMITY, INITIAL ENCOUNTER: Primary | ICD-10-CM

## 2022-06-21 PROCEDURE — 99214 OFFICE O/P EST MOD 30 MIN: CPT | Performed by: INTERNAL MEDICINE

## 2022-06-21 NOTE — PROGRESS NOTES
"Rooming Tab(CC,VS,Pt Hx,Fall Screen)  Chief Complaint   Patient presents with   • Insect Bite     Lt arm x 5 weeks       Subjective    HPI  The patient presents today for a follow-up for an insect bite.    Skin lesion  The patient reports that 5 weeks ago, she pulled what she thought was a tick off her left arm. She states that she looked down and saw a bump. She states that 5 to 6 days later, it had ring and was red. She states that she went to urgent care and she was given doxycycline and a topical. she notes it took some of the redness down but not all of it. She states that it sometimes gets swollen. She states that if anything touches it or irritates it, it gets very itchy. She states that it is beginning to become painful with any palpation.    The review of systems is negative for chest pain, shortness of breath, headache, vision change, nausea, vomiting, diarrhea, abdominal pain, bowel or bladder problems, significant joint problems, or skin related issues    I have reviewed and updated her medications, medical history and problem list during today's office visit.     Patient Care Team:  Seema Connell MD as PCP - General (Internal Medicine)    Problem List Tab  Medications Tab  Synopsis Tab  Chart Review Tab  Care Everywhere Tab  Immunizations Tab  Patient History Tab    Social History     Tobacco Use   • Smoking status: Never Smoker   • Smokeless tobacco: Never Used   Substance Use Topics   • Alcohol use: Yes     Comment: Social       Review of Systems   A review of systems was performed and the pertinent positives are noted in the HPI.    Objective     Rooming Tab(CC,VS,Pt Hx,Fall Screen)  /82 (BP Location: Right arm, Patient Position: Sitting, Cuff Size: Adult)   Pulse 74   Temp 96.8 °F (36 °C) (Infrared)   Resp 18   Ht 166.4 cm (65.5\")   Wt 89.4 kg (197 lb)   SpO2 99%   BMI 32.28 kg/m²     Body mass index is 32.28 kg/m².    Physical Exam  Vitals and nursing note reviewed. "   Constitutional:       Appearance: Normal appearance. She is well-developed.   HENT:      Head: Normocephalic and atraumatic.      Right Ear: Tympanic membrane normal.      Left Ear: Tympanic membrane normal.      Nose: No rhinorrhea.      Mouth/Throat:      Pharynx: No posterior oropharyngeal erythema.   Eyes:      Pupils: Pupils are equal, round, and reactive to light.   Cardiovascular:      Rate and Rhythm: Normal rate and regular rhythm.      Pulses: Normal pulses.      Heart sounds: Normal heart sounds. No murmur heard.  Pulmonary:      Effort: Pulmonary effort is normal.      Breath sounds: Normal breath sounds.   Abdominal:      General: Bowel sounds are normal. There is no distension.      Palpations: Abdomen is soft.   Musculoskeletal:         General: No tenderness.      Cervical back: Normal range of motion and neck supple.   Skin:     Capillary Refill: Capillary refill takes less than 2 seconds.      Comments: Left fore arm with raised red fluctuant lesion- area numbed and 11 blade open with hard core removed, n foreign body seen   Neurological:      Mental Status: She is alert and oriented to person, place, and time.   Psychiatric:         Mood and Affect: Mood normal.         Behavior: Behavior normal.          Statin Choice Calculator  Data Reviewed:    MRI Lumbar Spine Without Contrast    Result Date: 6/3/2022  Impression: Mild-to-moderate multilevel disc and facet joint degeneration resulting in varying degrees of neural foraminal narrowing and mild narrowing of the spinal canal at the L3-L4 level.  Electronically Signed By-Mg Zavala MD On:6/3/2022 4:09 PM This report was finalized on [id number] by  Mg Zavala MD.      The data below has been reviewed by Seema Connell MD on 06/21/2022.          Assessment & Plan      Diagnoses and all orders for this visit:    1. Infected insect bite of left upper extremity, initial encounter (Primary)  Assessment & Plan:  Lidocaine 1% for  numbing then lanced with 11 blade. Hemostasis obtained        Order Review Tab  Health Maintenance Tab  Patient Plan/Order Tab  Diagnoses and all orders for this visit:    1. Infected insect bite of left upper extremity, initial encounter (Primary)  Assessment & Plan:  Lidocaine 1% for numbing then lanced with 11 blade. Hemostasis obtained           -      The area was numbed and the lesion was lanced.     Transcribed from ambient dictation for Seema Connell MD by ROSEANNA RIVERA.  06/21/22   16:36 EDT    Patient verbalized consent to the visit recording.      Wrapup Tab  Return if symptoms worsen or fail to improve.       They were informed of the diagnosis and treatment plan and directed to f/u for any further problems or concerns.    Time 40 minutes total

## 2022-06-21 NOTE — PROGRESS NOTES
"Rooming Tab(CC,VS,Pt Hx,Fall Screen)  Chief Complaint   Patient presents with   • Insect Bite     Lt arm x 5 weeks       Subjective     I have reviewed and updated her medications, medical history and problem list during today's office visit.     Patient Care Team:  Seema Connell MD as PCP - General (Internal Medicine)    Problem List Tab  Medications Tab  Synopsis Tab  Chart Review Tab  Care Everywhere Tab  Immunizations Tab  Patient History Tab    Social History     Tobacco Use   • Smoking status: Never Smoker   • Smokeless tobacco: Never Used   Substance Use Topics   • Alcohol use: Yes     Comment: Social       Review of Systems    Objective     Rooming Tab(CC,VS,Pt Hx,Fall Screen)  /82 (BP Location: Right arm, Patient Position: Sitting, Cuff Size: Adult)   Pulse 74   Temp 96.8 °F (36 °C) (Infrared)   Resp 18   Ht 166.4 cm (65.5\")   Wt 89.4 kg (197 lb)   SpO2 99%   BMI 32.28 kg/m²     Body mass index is 32.28 kg/m².    Physical Exam     Statin Choice Calculator  Data Reviewed:    MRI Lumbar Spine Without Contrast    Result Date: 6/3/2022  Impression: Mild-to-moderate multilevel disc and facet joint degeneration resulting in varying degrees of neural foraminal narrowing and mild narrowing of the spinal canal at the L3-L4 level.  Electronically Signed By-Mg Zavala MD On:6/3/2022 4:09 PM This report was finalized on [id number] by  Mg Zavala MD.      {AMBULATORY LABS (Optional):10324}          Assessment & Plan   Order Review Tab  Health Maintenance Tab  Patient Plan/Order Tab  There are no diagnoses linked to this encounter.    Wrapup Tab  No follow-ups on file.       They were informed of the diagnosis and treatment plan and directed to f/u for any further problems or concerns.      During this visit for their annual exam, we reviewed their personal history, social history and family history.  We went over their medications and all the recommended health maintenence items for " their age group. They were given the opportunity to ask questions and discuss other concerns.

## 2022-06-26 PROBLEM — L08.9 INFECTED INSECT BITE OF LEFT ARM: Status: ACTIVE | Noted: 2022-06-26

## 2022-06-26 PROBLEM — S40.862A INFECTED INSECT BITE OF LEFT ARM: Status: ACTIVE | Noted: 2022-06-26
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
## 2022-09-07 RX ORDER — TOPIRAMATE 100 MG/1
TABLET, FILM COATED ORAL
Qty: 90 TABLET | Refills: 0 | OUTPATIENT
Start: 2022-09-07

## 2022-09-12 NOTE — TELEPHONE ENCOUNTER
Caller: Brooke Molina    Relationship: Self    Best call back number: [phone]    Requested Prescriptions:   Requested Prescriptions     Pending Prescriptions Disp Refills   • topiramate (TOPAMAX) 100 MG tablet 90 tablet 0     Sig: Take 1 tablet by mouth Daily.        Pharmacy where request should be sent: [facility] [place] - [address] - [phone]  - [phone] FX     Additional details provided by patient: PATIENT HAS AN APPOINTMENT 10/4/22. WILL BE OUT OF MEDICATION BEFORE THAT APPOINTMENT. WOULD LIKE A REFILL SENT IN TILL SHE CAN BE SEEN.     Does the patient have less than a 3 day supply:  [] Yes  [x] No    Juan Causey   09/12/22 11:03 EDT

## 2022-09-21 ENCOUNTER — TELEPHONE (OUTPATIENT)
Dept: FAMILY MEDICINE CLINIC | Facility: CLINIC | Age: 82
End: 2022-09-21

## 2022-09-21 RX ORDER — TOPIRAMATE 100 MG/1
100 TABLET, FILM COATED ORAL DAILY
Qty: 90 TABLET | Refills: 2 | Status: SHIPPED | OUTPATIENT
Start: 2022-09-21

## 2022-09-21 NOTE — TELEPHONE ENCOUNTER
PATIENT CALLED ON 9/12 TO REQUEST A REFILL OF TOPAMAX AND CALLED BACK TODAY TO CHECK ON THE STATUS. I TOLD HER IT LOOKS LIKE ANOTHER DR FILLED IT FOR HER IN June AND SHE SAID YES SHE HAD BEEN GETTING IT FROM A NEUROLOGIST THAT SHE IS NO LONGER SEEING AND WANTED TO KNOW IF DR WHEAT WOULD START PRESCRIBING IT.

## 2022-09-22 ENCOUNTER — OFFICE (AMBULATORY)
Dept: URBAN - METROPOLITAN AREA PATHOLOGY 4 | Facility: PATHOLOGY | Age: 82
End: 2022-09-22

## 2022-09-22 ENCOUNTER — OFFICE (AMBULATORY)
Dept: URBAN - METROPOLITAN AREA PATHOLOGY 4 | Facility: PATHOLOGY | Age: 82
End: 2022-09-22
Payer: COMMERCIAL

## 2022-09-22 ENCOUNTER — ON CAMPUS - OUTPATIENT (AMBULATORY)
Dept: URBAN - METROPOLITAN AREA HOSPITAL 2 | Facility: HOSPITAL | Age: 82
End: 2022-09-22

## 2022-09-22 VITALS
RESPIRATION RATE: 21 BRPM | OXYGEN SATURATION: 96 % | HEART RATE: 81 BPM | TEMPERATURE: 97.5 F | OXYGEN SATURATION: 95 % | OXYGEN SATURATION: 97 % | DIASTOLIC BLOOD PRESSURE: 85 MMHG | HEART RATE: 84 BPM | HEART RATE: 71 BPM | WEIGHT: 197 LBS | SYSTOLIC BLOOD PRESSURE: 133 MMHG | HEIGHT: 66 IN | OXYGEN SATURATION: 98 % | HEART RATE: 72 BPM | HEART RATE: 77 BPM | RESPIRATION RATE: 17 BRPM | SYSTOLIC BLOOD PRESSURE: 142 MMHG | RESPIRATION RATE: 16 BRPM | SYSTOLIC BLOOD PRESSURE: 132 MMHG | DIASTOLIC BLOOD PRESSURE: 81 MMHG | SYSTOLIC BLOOD PRESSURE: 141 MMHG | DIASTOLIC BLOOD PRESSURE: 80 MMHG | DIASTOLIC BLOOD PRESSURE: 77 MMHG | DIASTOLIC BLOOD PRESSURE: 76 MMHG | DIASTOLIC BLOOD PRESSURE: 78 MMHG | HEART RATE: 74 BPM | HEART RATE: 70 BPM | SYSTOLIC BLOOD PRESSURE: 139 MMHG | DIASTOLIC BLOOD PRESSURE: 64 MMHG | OXYGEN SATURATION: 99 % | HEART RATE: 82 BPM | DIASTOLIC BLOOD PRESSURE: 96 MMHG | RESPIRATION RATE: 19 BRPM | SYSTOLIC BLOOD PRESSURE: 129 MMHG | SYSTOLIC BLOOD PRESSURE: 150 MMHG | SYSTOLIC BLOOD PRESSURE: 130 MMHG | SYSTOLIC BLOOD PRESSURE: 140 MMHG | RESPIRATION RATE: 18 BRPM | RESPIRATION RATE: 22 BRPM

## 2022-09-22 DIAGNOSIS — D12.5 BENIGN NEOPLASM OF SIGMOID COLON: ICD-10-CM

## 2022-09-22 DIAGNOSIS — Z86.010 PERSONAL HISTORY OF COLONIC POLYPS: ICD-10-CM

## 2022-09-22 PROBLEM — K63.5 POLYP OF COLON: Status: ACTIVE | Noted: 2022-09-22

## 2022-09-22 LAB
GI HISTOLOGY: A. UNSPECIFIED: (no result)
GI HISTOLOGY: PDF REPORT: (no result)

## 2022-09-22 PROCEDURE — 45385 COLONOSCOPY W/LESION REMOVAL: CPT | Performed by: INTERNAL MEDICINE

## 2022-09-22 PROCEDURE — 88305 TISSUE EXAM BY PATHOLOGIST: CPT | Mod: 26 | Performed by: INTERNAL MEDICINE

## 2022-09-22 RX ORDER — DICYCLOMINE HYDROCHLORIDE 20 MG/1
40 TABLET ORAL
Qty: 60 | Refills: 11 | Status: COMPLETED
Start: 2022-09-22 | End: 2023-02-01

## 2022-09-22 NOTE — SERVICENOTES
PATIENT REPORTS FECAL INCONTINENCE.  BEGIN METAMUCIL AND DICYCLOMINE AND FOLLOW UP IN OFFICE.  CONSIDER PNE IF SHE FAILS TO IMPROVE.

## 2022-09-22 NOTE — SERVICEHPINOTES
ERICK SOTOMAYOR  is a  81  female   who presents today for a  Colonoscopy   for   the indications listed below. The updated Patient Profile was reviewed prior to the procedure, in conjunction with the Physical Exam, including medical conditions, surgical procedures, medications, allergies, family history and social history. See Physical Exam time stamp below for date and time of HPI completion.Pre-operatively, I reviewed the indication(s) for the procedure, the risks of the procedure [including but not limited to: unexpected bleeding possibly requiring hospitalization and/or unplanned repeat procedures, perforation possibly requiring surgical treatment, missed lesions and complications of sedation/MAC (also explained by anesthesia staff)]. I have evaluated the patient for risks associated with the planned anesthesia and the procedure to be performed and find the patient an acceptable candidate for IV sedation.Multiple opportunities were provided for any questions or concerns, and all questions were answered satisfactorily before any anesthesia was administered. We will proceed with the planned procedure.br

## 2022-09-28 PROBLEM — M54.16 LUMBAR RADICULOPATHY: Status: ACTIVE | Noted: 2022-06-09

## 2022-09-28 RX ORDER — TRIAMCINOLONE ACETONIDE 1 MG/G
CREAM TOPICAL
COMMUNITY
Start: 2022-07-05 | End: 2022-12-20

## 2022-09-28 RX ORDER — DICYCLOMINE HCL 20 MG
TABLET ORAL
COMMUNITY
Start: 2022-09-22 | End: 2022-12-20

## 2022-10-11 ENCOUNTER — OFFICE VISIT (OUTPATIENT)
Dept: FAMILY MEDICINE CLINIC | Facility: CLINIC | Age: 82
End: 2022-10-11

## 2022-10-11 ENCOUNTER — LAB (OUTPATIENT)
Dept: FAMILY MEDICINE CLINIC | Facility: CLINIC | Age: 82
End: 2022-10-11

## 2022-10-11 VITALS
RESPIRATION RATE: 16 BRPM | WEIGHT: 199.4 LBS | DIASTOLIC BLOOD PRESSURE: 80 MMHG | TEMPERATURE: 96.9 F | BODY MASS INDEX: 32.05 KG/M2 | HEIGHT: 66 IN | SYSTOLIC BLOOD PRESSURE: 110 MMHG | OXYGEN SATURATION: 97 % | HEART RATE: 77 BPM

## 2022-10-11 DIAGNOSIS — E55.9 VITAMIN D DEFICIENCY: ICD-10-CM

## 2022-10-11 DIAGNOSIS — E78.41 ELEVATED LIPOPROTEIN(A): Primary | ICD-10-CM

## 2022-10-11 DIAGNOSIS — R53.82 CHRONIC FATIGUE: ICD-10-CM

## 2022-10-11 DIAGNOSIS — E04.1 THYROID NODULE: ICD-10-CM

## 2022-10-11 LAB
25(OH)D3 SERPL-MCNC: 144 NG/ML (ref 30–100)
ALBUMIN SERPL-MCNC: 4.3 G/DL (ref 3.5–5.2)
ALBUMIN/GLOB SERPL: 2 G/DL
ALP SERPL-CCNC: 66 U/L (ref 39–117)
ALT SERPL W P-5'-P-CCNC: 13 U/L (ref 1–33)
ANION GAP SERPL CALCULATED.3IONS-SCNC: 8.6 MMOL/L (ref 5–15)
AST SERPL-CCNC: 16 U/L (ref 1–32)
BASOPHILS # BLD AUTO: 0.04 10*3/MM3 (ref 0–0.2)
BASOPHILS NFR BLD AUTO: 0.8 % (ref 0–1.5)
BILIRUB SERPL-MCNC: 0.3 MG/DL (ref 0–1.2)
BUN SERPL-MCNC: 21 MG/DL (ref 8–23)
BUN/CREAT SERPL: 26.3 (ref 7–25)
CALCIUM SPEC-SCNC: 9.8 MG/DL (ref 8.6–10.5)
CHLORIDE SERPL-SCNC: 105 MMOL/L (ref 98–107)
CO2 SERPL-SCNC: 27.4 MMOL/L (ref 22–29)
CREAT SERPL-MCNC: 0.8 MG/DL (ref 0.57–1)
DEPRECATED RDW RBC AUTO: 42.3 FL (ref 37–54)
EGFRCR SERPLBLD CKD-EPI 2021: 74.6 ML/MIN/1.73
EOSINOPHIL # BLD AUTO: 0.16 10*3/MM3 (ref 0–0.4)
EOSINOPHIL NFR BLD AUTO: 3.2 % (ref 0.3–6.2)
ERYTHROCYTE [DISTWIDTH] IN BLOOD BY AUTOMATED COUNT: 12.5 % (ref 12.3–15.4)
GLOBULIN UR ELPH-MCNC: 2.1 GM/DL
GLUCOSE SERPL-MCNC: 87 MG/DL (ref 65–99)
HCT VFR BLD AUTO: 40.4 % (ref 34–46.6)
HGB BLD-MCNC: 13.9 G/DL (ref 12–15.9)
IMM GRANULOCYTES # BLD AUTO: 0.01 10*3/MM3 (ref 0–0.05)
IMM GRANULOCYTES NFR BLD AUTO: 0.2 % (ref 0–0.5)
LYMPHOCYTES # BLD AUTO: 1.72 10*3/MM3 (ref 0.7–3.1)
LYMPHOCYTES NFR BLD AUTO: 34.1 % (ref 19.6–45.3)
MCH RBC QN AUTO: 31.6 PG (ref 26.6–33)
MCHC RBC AUTO-ENTMCNC: 34.4 G/DL (ref 31.5–35.7)
MCV RBC AUTO: 91.8 FL (ref 79–97)
MONOCYTES # BLD AUTO: 0.42 10*3/MM3 (ref 0.1–0.9)
MONOCYTES NFR BLD AUTO: 8.3 % (ref 5–12)
NEUTROPHILS NFR BLD AUTO: 2.7 10*3/MM3 (ref 1.7–7)
NEUTROPHILS NFR BLD AUTO: 53.4 % (ref 42.7–76)
NRBC BLD AUTO-RTO: 0 /100 WBC (ref 0–0.2)
PLATELET # BLD AUTO: 213 10*3/MM3 (ref 140–450)
PMV BLD AUTO: 10.2 FL (ref 6–12)
POTASSIUM SERPL-SCNC: 4 MMOL/L (ref 3.5–5.2)
PROT SERPL-MCNC: 6.4 G/DL (ref 6–8.5)
RBC # BLD AUTO: 4.4 10*6/MM3 (ref 3.77–5.28)
SODIUM SERPL-SCNC: 141 MMOL/L (ref 136–145)
T4 FREE SERPL-MCNC: 0.96 NG/DL (ref 0.93–1.7)
TSH SERPL DL<=0.05 MIU/L-ACNC: 1.88 UIU/ML (ref 0.27–4.2)
WBC NRBC COR # BLD: 5.05 10*3/MM3 (ref 3.4–10.8)

## 2022-10-11 PROCEDURE — 80053 COMPREHEN METABOLIC PANEL: CPT | Performed by: INTERNAL MEDICINE

## 2022-10-11 PROCEDURE — 85025 COMPLETE CBC W/AUTO DIFF WBC: CPT | Performed by: INTERNAL MEDICINE

## 2022-10-11 PROCEDURE — 84443 ASSAY THYROID STIM HORMONE: CPT | Performed by: INTERNAL MEDICINE

## 2022-10-11 PROCEDURE — 36415 COLL VENOUS BLD VENIPUNCTURE: CPT | Performed by: INTERNAL MEDICINE

## 2022-10-11 PROCEDURE — 99214 OFFICE O/P EST MOD 30 MIN: CPT | Performed by: INTERNAL MEDICINE

## 2022-10-11 PROCEDURE — 82306 VITAMIN D 25 HYDROXY: CPT | Performed by: INTERNAL MEDICINE

## 2022-10-11 PROCEDURE — 84439 ASSAY OF FREE THYROXINE: CPT | Performed by: INTERNAL MEDICINE

## 2022-10-11 NOTE — PROGRESS NOTES
"Rooming Tab(CC,VS,Pt Hx,Fall Screen)  Chief Complaint   Patient presents with   • medication refills     Patient also states she has no energy       Subjective     I have reviewed and updated her medications, medical history and problem list during today's office visit.     Patient indicated their consent to have their visit recorded and processed for the purpose of documenting their care today. The patient presents for follow-up.    Leg pain  The patient reports that the only pain she is having currently is in her bilateral legs and notes that she has a different doctor that is going to help her with that. The patient reports that she has a cane, but she conveys that she does not use it often    Migraines  The patient reports that her migraines are well controlled now. However, she states that she had cataract surgery that triggered her migraines. The patient reports that she had her bilateral eye cataracts have resolved already and confirms being able to drive again. The patient reports she had migraines 2 to 3 days after her cataract surgery.    Colonoscopy   The patient reports that she had a colonoscopy recently that revealed no abnormalities.     Hyperlipidemia  The patient reports that she has not been taking any medicine to control her cholesterol. The patient states that she has an appointment with her cardiologist in 2 weeks.    Mental health  The patient reports that she has gone on a hike with her friend. The patient reports that she is \"either really lazy or has no motivation whatsoever\". The patient reports that she has trouble beginning activities. The patient adds that even coming to this appointment today, on 10/11/2022, was difficult, and she notes she is frequently late to places that she goes. The patient reports that she showers every 2 days, but washes her hair every day. The patient states that she does not cook often because Enmanuel does not eat often. The patient reports that she ate chicken " "noodle soup last night.    Vitamin D deficiency  The patient reports that she is still taking vitamin D supplements 1 in the morning and 1 at night.    Urine  The patient  reports that for about a week her urine was a greenish yellow color. The patient notes that after noticing that she drank adequate amounts of water and has not had greenish yellow colored urine since.     Patient Care Team:  Seema Connell MD as PCP - General (Internal Medicine)    Problem List Tab  Medications Tab  Synopsis Tab  Chart Review Tab  Care Everywhere Tab  Immunizations Tab  Patient History Tab    Social History     Tobacco Use   • Smoking status: Never   • Smokeless tobacco: Never   Substance Use Topics   • Alcohol use: Yes     Comment: Social       Review of Systems    Objective     Rooming Tab(CC,VS,Pt Hx,Fall Screen)  /80   Pulse 77   Temp 96.9 °F (36.1 °C) (Infrared)   Resp 16   Ht 166.4 cm (65.51\")   Wt 90.4 kg (199 lb 6.4 oz)   SpO2 97%   BMI 32.67 kg/m²     Body mass index is 32.67 kg/m².    Physical Exam  Vitals and nursing note reviewed.   Constitutional:       Appearance: Normal appearance. She is well-developed.   HENT:      Head: Normocephalic and atraumatic.      Right Ear: Tympanic membrane normal.      Left Ear: Tympanic membrane normal.      Nose: No rhinorrhea.      Mouth/Throat:      Pharynx: No posterior oropharyngeal erythema.   Eyes:      Pupils: Pupils are equal, round, and reactive to light.   Cardiovascular:      Rate and Rhythm: Normal rate and regular rhythm.      Pulses: Normal pulses.      Heart sounds: Normal heart sounds. No murmur heard.  Pulmonary:      Effort: Pulmonary effort is normal.      Breath sounds: Normal breath sounds.   Abdominal:      General: Bowel sounds are normal. There is no distension.      Palpations: Abdomen is soft.   Musculoskeletal:         General: Tenderness present.      Cervical back: Normal range of motion and neck supple.   Skin:     Capillary Refill: " Capillary refill takes less than 2 seconds.   Neurological:      Mental Status: She is alert and oriented to person, place, and time.   Psychiatric:         Mood and Affect: Mood normal.         Behavior: Behavior normal.          Statin Choice Calculator  Data Reviewed:         The data below has been reviewed by Seema Connell MD on 10/11/2022.          Assessment & Plan   Order Review Tab  Health Maintenance Tab  Patient Plan/Order Tab  Diagnoses and all orders for this visit:    1. Elevated lipoprotein(a) (Primary)  Comments:  trial of repatha  Orders:  -     Evolocumab (REPATHA) solution prefilled syringe injection; Inject 1 mL under the skin into the appropriate area as directed Every 14 (Fourteen) Days.  Dispense: 2 mL; Refill: 1    2. Vitamin D deficiency  Comments:  still on meds  Orders:  -     Vitamin D 25 Hydroxy    3. Thyroid nodule  -     TSH  -     T4, Free    4. Chronic fatigue  Comments:   check levels feel a part is of depression  Orders:  -     Comprehensive Metabolic Panel  -     CBC Auto Differential        Wrapup Tab  Return if symptoms worsen or fail to improve.       They were informed of the diagnosis and treatment plan and directed to f/u for any further problems or concerns.      Transcribed from ambient dictation for Seema Connell MD by Trisha Fermin.  10/11/22   12:21 EDT    Patient or patient representative verbalized consent to the visit recording.  I have personally performed the services described in this document as transcribed by the above individual, and it is both accurate and complete.  Seema Connell MD  10/12/2022  21:51 EDT

## 2022-10-17 NOTE — PROGRESS NOTES
I spoke with Brooke and relayed that her labs results showed. vD is too high stop vD altoegther until end of December then recheck please

## 2022-10-24 ENCOUNTER — OFFICE VISIT (OUTPATIENT)
Dept: CARDIOLOGY | Facility: CLINIC | Age: 82
End: 2022-10-24

## 2022-10-24 VITALS
WEIGHT: 199 LBS | BODY MASS INDEX: 31.98 KG/M2 | OXYGEN SATURATION: 99 % | DIASTOLIC BLOOD PRESSURE: 72 MMHG | HEART RATE: 74 BPM | HEIGHT: 66 IN | SYSTOLIC BLOOD PRESSURE: 120 MMHG

## 2022-10-24 DIAGNOSIS — I48.0 PAROXYSMAL ATRIAL FIBRILLATION: ICD-10-CM

## 2022-10-24 DIAGNOSIS — I10 ESSENTIAL HYPERTENSION: ICD-10-CM

## 2022-10-24 DIAGNOSIS — R00.2 PALPITATIONS: ICD-10-CM

## 2022-10-24 DIAGNOSIS — I06.1 RHEUMATIC AORTIC VALVE INSUFFICIENCY: Primary | ICD-10-CM

## 2022-10-24 DIAGNOSIS — E78.2 MIXED HYPERLIPIDEMIA: ICD-10-CM

## 2022-10-24 PROCEDURE — 99214 OFFICE O/P EST MOD 30 MIN: CPT | Performed by: INTERNAL MEDICINE

## 2022-10-24 PROCEDURE — 93000 ELECTROCARDIOGRAM COMPLETE: CPT | Performed by: INTERNAL MEDICINE

## 2022-10-31 NOTE — PROGRESS NOTES
Cardiology Clinic Note  Evert Bernstein MD, PhD    Subjective:     Encounter Date:10/24/2022      Patient ID: Brooke Molina is a 82 y.o. female.    Chief Complaint:  Chief Complaint   Patient presents with   • Atrial Fibrillation   • Hypertension   • Hyperlipidemia   • Palpitations   • Follow-up       HPI:  I the pleasure to see this very pleasant 82-year-old female in follow-up today established with cardiology with history of aortic valve stenosis and mitral valve insufficiency, paroxysmal atrial fibrillation hypertension hyperlipidemia, last stress test and echo evaluation was in March 2021.  She received valve replacement with 21 mm bioprosthetic stentless Mckeon valve previously which was seen to be normal functioning.  EF 55 to 60% with mild to moderate concentric LVH, grade 1 diastolic dysfunction.  She is also had ABIs in the last 12 months which demonstrated normal bilateral ABIs no evidence of obstructive disease.  Stress testing in March 2021 revealed an EF of 55%, normal myocardial perfusion with a low risk study.  She presents back today otherwise blood pressure well controlled 120 systolic heart rates in the 70s.  She is doing well.  She is in sinus rhythm today with nonspecific ST-T wave abnormalities in the precordial leads.  She denies any chest pain shortness of breath and otherwise says she is doing well, she is euvolemic with no heart failure signs or symptoms    Review of systems otherwise negative x14 point review of systems except was mentioned above      Historical data copied forward from previous encounters in EMR including the history, exam, and assessment/plan has been reviewed and is unchanged unless noted otherwise.    Cardiac medicines reviewed with risk, benefits, and necessity of each discussed.    Risk and benefit of cardiac testing reviewed including death heart attack stroke pain bleeding infection need for vascular /cardiovascular surgery were discussed and the patient  "    Objective:         /72 (BP Location: Left arm, Patient Position: Sitting, Cuff Size: Large Adult)   Pulse 74   Ht 166.4 cm (65.51\")   Wt 90.3 kg (199 lb)   SpO2 99%   BMI 32.60 kg/m²     Physical Exam  Regular rate and rhythm with no rubs gallops heave or lift  1 out of 6 systolic ejection murmur lower sternal border  Sternotomy clean and dry  Well-healed  Radial pulses 2+ equal bilaterally next normal cap refill  No carotid bruits or JVD  Clear to auscultation    Assessment:         Diagnoses and all orders for this visit:    1. Rheumatic aortic valve insufficiency (Primary)  -     ECG 12 Lead    2. Essential hypertension  -     ECG 12 Lead    3. Paroxysmal atrial fibrillation (HCC)  -     ECG 12 Lead    4. Mixed hyperlipidemia  -     ECG 12 Lead    5. Palpitations  -     ECG 12 Lead      Severe aortic stenosis, bioprosthetic AVR remotely 2013, normal functioning  Aspirin for life  Antibiotic prophylaxis prior to any surgical procedure    Paroxysmal A. fib, sinus rhythm, doing well without recurrence, aspirin, beta-blocker    Hyperlipidemia, Repatha on board    Palpitations, asymptomatic presently, continue beta-blocker    Essential hypertension well-controlled    History of PAD, normal ABIs within the last year    Diet and exercise per HI guidelines  Good functional status  EKG is unchanged  Low-cholesterol Mediterranean diet recommended  Routine activity  See her back in 1 year    The pleasure to be involved in this patient's cardiovascular care.  Please call with any questions or concerns  Evert Bernstein MD, PhD    Most recent EKG as reviewed and interpreted by me:    ECG 12 Lead    Date/Time: 10/24/2022 9:38 AM  Performed by: Evert Bernstein MD  Authorized by: Evert Bernstein MD   Comparison: not compared with previous ECG   Previous ECG: no previous ECG available  Rhythm: sinus rhythm  Rate: normal  QRS axis: normal  Other findings: non-specific ST-T wave changes    Clinical " impression: non-specific ECG             Most recent echo as reviewed and interpreted by me:  Results for orders placed during the hospital encounter of 03/08/21    Adult Transthoracic Echo Complete W/ Cont if Necessary Per Protocol    Interpretation Summary  · Left ventricular systolic function is normal.  · Left ventricular ejection fraction is 55 to 60%  · Left ventricular wall thickness is consistent with mild to moderate concentric hypertrophy.  · Left ventricular diastolic function is consistent with (grade I) impaired relaxation.  · There is a bioprosthetic aortic valve present. It appears to be stentless bioprosthetic valve.      Most recent stress test as reviewed and interpreted by me:  Results for orders placed during the hospital encounter of 03/08/21    Stress Test With Myocardial Perfusion One Day    Interpretation Summary  · Findings consistent with an abnormal ECG stress test.  · Breast attenuation artifact is present.  · Left ventricular ejection fraction is normal. (Calculated EF = 57%).  · Myocardial perfusion imaging indicates a normal myocardial perfusion study with no evidence of ischemia.  · Impressions are consistent with a low risk study.  · This is normal Cardiolite imaging stress test with no evidence of ischemia or myocardial infarction. Small apical thinning is noted which showed normal thickening and contractility consistent with attenuation artifact. Left ventricle size and function is normal on gated SPECT imaging. No wall motion abnormality was noted. Clinical correlation recommended. Further recommendation as per ordering physician..      Most recent cardiac catheterization as reviewed interpreted by me:  No results found for this or any previous visit.    The following portions of the patient's history were reviewed and updated as appropriate: allergies, current medications, past family history, past medical history, past social history, past surgical history and problem  list.      ROS:  14 point review of systems negative except as mentioned above    Current Outpatient Medications:   •  amitriptyline (ELAVIL) 25 MG tablet, TAKE ONE TABLET BY MOUTH ONCE NIGHTLY, Disp: 90 tablet, Rfl: 0  •  aspirin 81 MG chewable tablet, Chew 81 mg daily., Disp: , Rfl:   •  betamethasone dipropionate (DIPROLENE) 0.05 % lotion, Apply  topically to the appropriate area as directed 2 (Two) Times a Day. 2-3 times daily PRN, Disp: 60 mL, Rfl: 0  •  clindamycin (CLEOCIN T) 1 % lotion, , Disp: , Rfl:   •  dicyclomine (BENTYL) 20 MG tablet, , Disp: , Rfl:   •  Evolocumab (REPATHA) solution prefilled syringe injection, Inject 1 mL under the skin into the appropriate area as directed Every 14 (Fourteen) Days., Disp: 2 mL, Rfl: 1  •  ezetimibe (Zetia) 10 MG tablet, Take 1 tablet by mouth Daily., Disp: 30 tablet, Rfl: 3  •  metoprolol tartrate (LOPRESSOR) 50 MG tablet, Take 1 tablet by mouth 2 (Two) Times a Day., Disp: 180 tablet, Rfl: 3  •  multivitamin (THERAGRAN) tablet tablet, Take 1 tablet by mouth Daily., Disp: , Rfl:   •  Omega-3 Fatty Acids (FISH OIL) 1000 MG capsule capsule, Take 1,000 mg by mouth Daily With Breakfast., Disp: , Rfl:   •  ondansetron ODT (Zofran ODT) 4 MG disintegrating tablet, Place 1 tablet on the tongue Every 8 (Eight) Hours As Needed for Nausea or Vomiting., Disp: 45 tablet, Rfl: 1  •  sertraline (ZOLOFT) 50 MG tablet, TAKE ONE TABLET BY MOUTH DAILY, Disp: 90 tablet, Rfl: 1  •  topiramate (TOPAMAX) 100 MG tablet, Take 1 tablet by mouth Daily., Disp: 90 tablet, Rfl: 2  •  triamcinolone (KENALOG) 0.1 % cream, , Disp: , Rfl:   •  vitamin B-12 (CYANOCOBALAMIN) 500 MCG tablet, Take 500 mcg by mouth daily., Disp: , Rfl:   •  vitamin C (ASCORBIC ACID) 250 MG tablet, Take 250 mg by mouth Daily., Disp: , Rfl:   •  Zinc Sulfate (ZINC 15 PO), Take  by mouth., Disp: , Rfl:     Problem List:  Patient Active Problem List   Diagnosis   • Migraine with aura and without status migrainosus, not  intractable   • Transient cerebral ischemia   • DDD (degenerative disc disease), lumbosacral   • DJD (degenerative joint disease) of knee   • GERD (gastroesophageal reflux disease)   • Situational depression   • Aortic insufficiency   • Carotid bruit   • Essential hypertension   • Paroxysmal atrial fibrillation (HCC)   • Valvular heart disease   • Vitamin D deficiency   • Osteoarthritis   • Gastroesophageal reflux disease   • Mixed hyperlipidemia   • Migraine headache   • Temporary cerebral vascular dysfunction   • Headache   • Atypical migraine   • Palpitations   • Obesity (BMI 30-39.9)   • Other chest pain   • Paresthesia   • Osteoarthrosis, localized, primary, involving shoulder region   • Spinal stenosis of cervicothoracic region   • PAD (peripheral artery disease) (HCC)   • Elevated lipoprotein(a)   • Weakness of both legs   • Infected insect bite of left arm   • Lumbar radiculopathy   • Thyroid nodule   • Chronic fatigue     Past Medical History:  Past Medical History:   Diagnosis Date   • Aortic stenosis, severe    • Arthritis    • Atypical migraine 9/24/2019   • Excessive daytime sleepiness    • Hyperlipidemia    • Hypertension    • Measles    • Migraine    • Mitral valve insufficiency     Mild to Moderate--S/p MVR   • Paroxysmal atrial fibrillation (HCC) 11/4/2016   • Thyroid disease    • Valvular heart disease 6/19/2019    AV replacement  Tissue 2/5/2013     Past Surgical History:  Past Surgical History:   Procedure Laterality Date   • AORTIC VALVE REPAIR/REPLACEMENT  2/5/13- [facility]   • APPENDECTOMY     • BREAST BIOPSY     • CARDIAC CATHETERIZATION  08/24/12- [facility]   • HYSTERECTOMY     • REDUCTION MAMMAPLASTY     • STERNOTOMY  02/5/13- [facility]   • THYROID SURGERY       Social History:  Social History     Socioeconomic History   • Marital status:    • Number of children: 2   Tobacco Use   • Smoking status: Never   • Smokeless tobacco: Never   Vaping Use   • Vaping Use:  Never used   Substance and Sexual Activity   • Alcohol use: Yes     Comment: Social   • Drug use: No   • Sexual activity: Defer     Birth control/protection: Surgical     Comment: Hyst     Allergies:  Allergies   Allergen Reactions   • Codeine GI Intolerance     Immunizations:  Immunization History   Administered Date(s) Administered   • COVID-19 (MODERNA) 1st, 2nd, 3rd Dose Only 01/18/2021, 02/17/2021   • Fluzone High Dose =>65 Years ([facility] ONLY) 11/04/2013, 10/14/2019, 09/22/2020   • Hepatitis A 04/21/2018, 12/20/2018   • Pneumococcal Polysaccharide (PPSV23) 12/23/2019            In-Office Procedure(s):  ECG 12 Lead    (Results Pending)        ASCVD RIsk Score::  The ASCVD Risk score (Es HUGO, et al., 2019) failed to calculate for the following reasons:    The 2019 ASCVD risk score is only valid for ages 40 to 79    The patient has a prior MI or stroke diagnosis    Imaging:    Results for orders placed in visit on 02/10/22    SCANNED - IMAGING       Results for orders placed during the hospital encounter of 08/28/19    CT Head Without Contrast    Narrative  CT HEAD WO CONTRAST-    Date of Exam: 8/29/2019 10:05 AM    Indication: Neuroendocrine sign / symptoms.    Comparison: None available.    Technique:  Without contrast, contiguous axial CT images of the head  were obtained from skull base to vertex.  Coronal and sagittal  reconstructions were performed.  Automated exposure control and  iterative reconstruction methods were used.    FINDINGS  No evidence of intracranial hemorrhage, mass or midline shift. Sulci and  ventricles are symmetric. Brain volume is appropriate for patient's age.  The gray-white matter differentiation is intact. The mastoid air cells  and paranasal sinuses are well aerated. Globes and extraocular muscles  are normal. No displaced or depressed skull fractures. No suspicious  lytic or sclerotic osseous lesions.    Impression  No acute intracranial abnormality. No change from previous  examination.  Brain MRI is more sensitive to evaluate for acute or subacute infarcts  and to evaluate for intracranial metastatic disease.    Electronically Signed By-Bailey Lane On:8/29/2019 10:11 AM  This report was finalized on [id number] by  Bailey Lane, .      Results for orders placed during the hospital encounter of 08/28/19    CT Head Without Contrast    Narrative  CT HEAD WO CONTRAST-    Date of Exam: 8/29/2019 10:05 AM    Indication: Neuroendocrine sign / symptoms.    Comparison: None available.    Technique:  Without contrast, contiguous axial CT images of the head  were obtained from skull base to vertex.  Coronal and sagittal  reconstructions were performed.  Automated exposure control and  iterative reconstruction methods were used.    FINDINGS  No evidence of intracranial hemorrhage, mass or midline shift. Sulci and  ventricles are symmetric. Brain volume is appropriate for patient's age.  The gray-white matter differentiation is intact. The mastoid air cells  and paranasal sinuses are well aerated. Globes and extraocular muscles  are normal. No displaced or depressed skull fractures. No suspicious  lytic or sclerotic osseous lesions.    Impression  No acute intracranial abnormality. No change from previous examination.  Brain MRI is more sensitive to evaluate for acute or subacute infarcts  and to evaluate for intracranial metastatic disease.    Electronically Signed By-Bailey Lane On:8/29/2019 10:11 AM  This report was finalized on [id number] by  Bailey Lane, .      Lab Review:   Office Visit on 10/11/2022   Component Date Value   • Glucose 10/11/2022 87    • BUN 10/11/2022 21    • Creatinine 10/11/2022 0.80    • Sodium 10/11/2022 141    • Potassium 10/11/2022 4.0    • Chloride 10/11/2022 105    • CO2 10/11/2022 27.4    • Calcium 10/11/2022 9.8    • Total Protein 10/11/2022 6.4    • Albumin 10/11/2022 4.30    • ALT (SGPT) 10/11/2022 13    • AST (SGOT) 10/11/2022 16    • Alkaline Phosphatase  10/11/2022 66    • Total Bilirubin 10/11/2022 0.3    • Globulin 10/11/2022 2.1    • A/G Ratio 10/11/2022 2.0    • BUN/Creatinine Ratio 10/11/2022 26.3 (H)    • Anion Gap 10/11/2022 8.6    • eGFR 10/11/2022 74.6    • 25 Hydroxy, Vitamin D 10/11/2022 144.0 (H)    • Free T4 10/11/2022 0.96    • WBC 10/11/2022 5.05    • RBC 10/11/2022 4.40    • Hemoglobin 10/11/2022 13.9    • Hematocrit 10/11/2022 40.4    • MCV 10/11/2022 91.8    • MCH 10/11/2022 31.6    • MCHC 10/11/2022 34.4    • RDW 10/11/2022 12.5    • RDW-SD 10/11/2022 42.3    • MPV 10/11/2022 10.2    • Platelets 10/11/2022 213    • Neutrophil % 10/11/2022 53.4    • Lymphocyte % 10/11/2022 34.1    • Monocyte % 10/11/2022 8.3    • Eosinophil % 10/11/2022 3.2    • Basophil % 10/11/2022 0.8    • Immature Grans % 10/11/2022 0.2    • Neutrophils, Absolute 10/11/2022 2.70    • Lymphocytes, Absolute 10/11/2022 1.72    • Monocytes, Absolute 10/11/2022 0.42    • Eosinophils, Absolute 10/11/2022 0.16    • Basophils, Absolute 10/11/2022 0.04    • Immature Grans, Absolute 10/11/2022 0.01    • nRBC 10/11/2022 0.0      Recent labs reviewed and interpreted for clinical significance and application            Level of Care:           Evert Bernstein MD  10/31/22  .

## 2022-11-07 RX ORDER — AMITRIPTYLINE HYDROCHLORIDE 25 MG/1
TABLET, FILM COATED ORAL
Qty: 90 TABLET | Refills: 0 | Status: SHIPPED | OUTPATIENT
Start: 2022-11-07

## 2022-11-23 RX ORDER — METOPROLOL TARTRATE 50 MG/1
50 TABLET, FILM COATED ORAL 2 TIMES DAILY
Qty: 180 TABLET | Refills: 3 | Status: SHIPPED | OUTPATIENT
Start: 2022-11-23

## 2022-12-02 NOTE — TELEPHONE ENCOUNTER
Caller: Brooke Molina AMILCAR    Relationship: Self    Best call back number:[phone]     Requested Prescriptions:   Requested Prescriptions     Pending Prescriptions Disp Refills   • sertraline (ZOLOFT) 50 MG tablet 90 tablet 1     Sig: Take 1 tablet by mouth Daily.        Pharmacy where request should be sent: [facility] [id number] - [place], [place] - [address] - [phone] [facility] [phone] FX     Additional details provided by patient: PATIENT STATES SHE DOES NOT HAVE ANY OF THE MEDICATION.  SHE STATES SHE HAS BEEN OUT OF THE MEDICATION FOR A COUPLE OF DAYS.  SHE STATES THE PHARMACY TOLD HER THEY HAVE SENT MULTIPLE REQUESTS, BUT I DID NOT SEE ONE.    Does the patient have less than a 3 day supply:  [x] Yes  [] No    Would you like a call back once the refill request has been completed: [x] Yes [] No    If the office needs to give you a call back, can they leave a voicemail: [x] Yes [] No    Kellee Garcia, Maryjo Rep   12/02/22 09:01 EST     PLEASE ADVISE.

## 2022-12-06 DIAGNOSIS — E78.41 ELEVATED LIPOPROTEIN(A): ICD-10-CM

## 2022-12-06 RX ORDER — EVOLOCUMAB 140 MG/ML
INJECTION, SOLUTION SUBCUTANEOUS
Qty: 2 ML | Refills: 1 | Status: SHIPPED | OUTPATIENT
Start: 2022-12-06 | End: 2023-02-14 | Stop reason: SDUPTHER

## 2022-12-12 ENCOUNTER — OFFICE VISIT (OUTPATIENT)
Dept: FAMILY MEDICINE CLINIC | Facility: CLINIC | Age: 82
End: 2022-12-12

## 2022-12-12 VITALS
HEART RATE: 87 BPM | DIASTOLIC BLOOD PRESSURE: 58 MMHG | BODY MASS INDEX: 31.98 KG/M2 | OXYGEN SATURATION: 97 % | WEIGHT: 199 LBS | HEIGHT: 66 IN | SYSTOLIC BLOOD PRESSURE: 98 MMHG

## 2022-12-12 DIAGNOSIS — R05.1 ACUTE COUGH: Primary | ICD-10-CM

## 2022-12-12 PROCEDURE — 99213 OFFICE O/P EST LOW 20 MIN: CPT | Performed by: NURSE PRACTITIONER

## 2022-12-12 RX ORDER — METHYLPREDNISOLONE 4 MG/1
TABLET ORAL
Qty: 1 EACH | Refills: 0 | Status: SHIPPED | OUTPATIENT
Start: 2022-12-12 | End: 2022-12-20

## 2022-12-12 RX ORDER — BENZONATATE 200 MG/1
200 CAPSULE ORAL 3 TIMES DAILY PRN
Qty: 15 CAPSULE | Refills: 0 | Status: SHIPPED | OUTPATIENT
Start: 2022-12-12 | End: 2022-12-20

## 2022-12-12 NOTE — PROGRESS NOTES
"Chief Complaint  Cough (That won't stop, keeping her from sleeping and coughs so much she can't get her breath) and Nasal Congestion    Subjective          Brooke Molina presents to [facility]  History of Present Illness    Is a patienit of Dr. Connell, previously unknown to me  She is here today with c/o cough    She was sick with a similar illness 2 weeks ago which got better until she was at the hospital with her  for the past 2 weeks  On Friday she started with a headache, nasal stuffiness, and a terrible racking cough    She has h/o AI, carotid bruit, HTN, p a-fib, hyperlipidemia, pad, obesity, gerd, oa, djd, depression, migraine, chronic fatigue  Her current prescription medications are elavil, betamethasone lotion, zetia, metoprolol, repathy, zoloft, topamax and she also takes otc supplements as well    She has increased water consumption, has tried hot tea, cough medicine recommended per uc provider    Review of Systems   Constitutional: Negative.  Negative for appetite change and fever.   Respiratory: Positive for cough. Negative for shortness of breath and wheezing.         Cough is dry   Gastrointestinal: Negative.  Negative for constipation, diarrhea, nausea and vomiting.   Neurological: Negative.  Negative for headaches. Speech difficulty:      Psychiatric/Behavioral: Positive for sleep disturbance.        Sleep is disrupted by coughing     Objective   Vital Signs:  BP 98/58   Pulse 87   Ht 166.4 cm (65.51\")   Wt 90.3 kg (199 lb)   SpO2 97%   BMI 32.60 kg/m²     BP Readings from Last 3 Encounters:   12/12/22 98/58   10/24/22 120/72   10/11/22 110/80        Wt Readings from Last 3 Encounters:   12/12/22 90.3 kg (199 lb)   10/24/22 90.3 kg (199 lb)   10/11/22 90.4 kg (199 lb 6.4 oz)              Physical Exam  Vitals reviewed.   Constitutional:       Appearance: Normal appearance.   HENT:      Nose: Nose normal.      Mouth/Throat:      Mouth: Mucous " membranes are moist.      Pharynx: Oropharynx is clear.   Cardiovascular:      Rate and Rhythm: Normal rate and regular rhythm.      Pulses: Normal pulses.      Heart sounds: Normal heart sounds.   Pulmonary:      Effort: Pulmonary effort is normal.      Breath sounds: Normal breath sounds.   Musculoskeletal:      Cervical back: Neck supple. No tenderness.   Lymphadenopathy:      Cervical: No cervical adenopathy.   Skin:     General: Skin is warm.   Neurological:      Mental Status: She is alert and oriented to person, place, and time.        Result Review :                 Assessment and Plan    Diagnoses and all orders for this visit:    1. Acute cough (Primary)  -     methylPREDNISolone (MEDROL) 4 MG dose pack; Take as directed on package instructions.  Dispense: 1 each; Refill: 0  -     benzonatate (TESSALON) 200 MG capsule; Take 1 capsule by mouth 3 (Three) Times a Day As Needed for Cough.  Dispense: 15 capsule; Refill: 0    continue with increased fluids, tea with lemon and honey, can use cough drops/throat lozenges       Follow Up   Return if symptoms worsen or fail to improve.  Patient was given instructions and counseling regarding her condition or for health maintenance advice. Please see specific information pulled into the AVS if appropriate.

## 2022-12-20 ENCOUNTER — LAB (OUTPATIENT)
Dept: FAMILY MEDICINE CLINIC | Facility: CLINIC | Age: 82
End: 2022-12-20

## 2022-12-20 ENCOUNTER — OFFICE VISIT (OUTPATIENT)
Dept: FAMILY MEDICINE CLINIC | Facility: CLINIC | Age: 82
End: 2022-12-20

## 2022-12-20 VITALS
HEART RATE: 71 BPM | DIASTOLIC BLOOD PRESSURE: 79 MMHG | SYSTOLIC BLOOD PRESSURE: 151 MMHG | OXYGEN SATURATION: 99 % | WEIGHT: 198 LBS | RESPIRATION RATE: 16 BRPM | BODY MASS INDEX: 31.82 KG/M2 | TEMPERATURE: 96.8 F | HEIGHT: 66 IN

## 2022-12-20 DIAGNOSIS — T45.2X1D POISONING BY VITAMIN D, ACCIDENTAL OR UNINTENTIONAL, SUBSEQUENT ENCOUNTER: Primary | ICD-10-CM

## 2022-12-20 DIAGNOSIS — E55.9 VITAMIN D DEFICIENCY, UNSPECIFIED: ICD-10-CM

## 2022-12-20 DIAGNOSIS — E78.41 ELEVATED LIPOPROTEIN(A): ICD-10-CM

## 2022-12-20 DIAGNOSIS — M35.01 SJOGREN'S SYNDROME WITH KERATOCONJUNCTIVITIS SICCA: ICD-10-CM

## 2022-12-20 PROCEDURE — 80053 COMPREHEN METABOLIC PANEL: CPT | Performed by: INTERNAL MEDICINE

## 2022-12-20 PROCEDURE — 99214 OFFICE O/P EST MOD 30 MIN: CPT | Performed by: INTERNAL MEDICINE

## 2022-12-20 PROCEDURE — 80061 LIPID PANEL: CPT | Performed by: INTERNAL MEDICINE

## 2022-12-20 PROCEDURE — 36415 COLL VENOUS BLD VENIPUNCTURE: CPT | Performed by: INTERNAL MEDICINE

## 2022-12-20 PROCEDURE — 82306 VITAMIN D 25 HYDROXY: CPT | Performed by: INTERNAL MEDICINE

## 2022-12-20 RX ORDER — HYDROXYCHLOROQUINE SULFATE 200 MG/1
200 TABLET, FILM COATED ORAL DAILY
Qty: 90 TABLET | Refills: 2 | Status: SHIPPED | OUTPATIENT
Start: 2022-12-20 | End: 2023-03-20

## 2022-12-20 RX ORDER — SERTRALINE HYDROCHLORIDE 100 MG/1
100 TABLET, FILM COATED ORAL DAILY
Qty: 30 TABLET | Refills: 4 | Status: SHIPPED | OUTPATIENT
Start: 2022-12-20

## 2022-12-20 NOTE — PROGRESS NOTES
"Rooming Tab(CC,VS,Pt Hx,Fall Screen)  Chief Complaint   Patient presents with   • Hyperlipidemia   • Vitamin D Deficiency       Subjective    Vitamin D deficiency  The patient would like to see if her vitamin D levels and cholesterol have improved since she has been administering Repatha. She reports that she is not having any trouble with the injections.    Sinus infection  The patient states that she has had 3 recurring sinus infections over the last few months. She states that she went to urgent care once, and they did not treat her for anything. Another time she saw Owen, nurse practitioner, and she was given steroids. She notes that the steroids give her a significant amount of energy, and she finally could function. She notes that she had a nonproductive cough, and she almost passed out once. She reports that she drank tea with honey and administered cough medicine, and she finally got rid of the dry cough. She notes that she still has bouts of it, and her voice goes \"in and out\" occasionally. The patient did not have pneumonia.    Situational depression  The patient is administering sertraline for anxiety. She has significant stress in her life right now. Her  is recovering from multiple falls, and she is having a hard time dealing with it, as he does not want to follow doctor's orders. She states that he tries to get around without his walker, which causes him to fall. He also is always fighting with her because he wants to drive.    Dry eyes and mouth  She reports that she has been treated for dry eyes for quite a while. She reports that she has tried drops with steroids, but she has not tried Restasis. She was given a nose spray to apply 2 to 3 times per day, and it is very expensive. She did not use it regularly because she had been spending most of her time with her  in rehabilitation. The patient reports that she has had cataract surgery with Dr. Springer, and she notes that her dry eyes " "are worse since the surgery. She also notes that suddenly her mouth would become so dry that she cannot move her mouth. She mentioned this to Dr. Springer, and he suggested that it could be Sjogren's. The patient researched it, and Sjogren's is in the family of lupus and autoimmune disorders. She notes that she has also been losing her sense of taste. She reports that she has been on amitriptyline for 4 to 5 years, which can cause some drying. She reports that she has had dry for a while, and she has been able to treat it with just the Refresh Tears. She notes that her dry eyes have become worse since she had her cataracts removed. She reports that they are so sensitive to light. She reports during the day her mouth will suddenly become so dry that she cannot swallow. She reports that she gets up multiple times at night to drink something.     Rash  The patient reports that she develops a rash on her lower extremities every year in April that resolves in September.     Weakness in lower extremities  The patient notes that she experiences weakness in her lower extremities. She reports that a doctor gave her a stem cell injection in her left knee and her knee has improved.       I have reviewed and updated her medications, medical history and problem list during today's office visit.     Patient Care Team:  Seema Connell MD as PCP - General (Internal Medicine)    Problem List Tab  Medications Tab  Synopsis Tab  Chart Review Tab  Care Everywhere Tab  Immunizations Tab  Patient History Tab    Social History     Tobacco Use   • Smoking status: Never   • Smokeless tobacco: Never   Substance Use Topics   • Alcohol use: Yes     Comment: Social       Review of Systems    Objective     Rooming Tab(CC,VS,Pt Hx,Fall Screen)  /79   Pulse 71   Temp 96.8 °F (36 °C)   Resp 16   Ht 166.4 cm (65.5\")   Wt 89.8 kg (198 lb)   SpO2 99%   BMI 32.45 kg/m²     Body mass index is 32.45 kg/m².    Physical Exam  Vitals and " nursing note reviewed.   Constitutional:       Appearance: Normal appearance. She is well-developed.   HENT:      Head: Normocephalic and atraumatic.      Nose: No rhinorrhea.      Mouth/Throat:      Pharynx: No posterior oropharyngeal erythema.   Eyes:      Pupils: Pupils are equal, round, and reactive to light.      Comments: Dry eyes, red    Cardiovascular:      Rate and Rhythm: Normal rate and regular rhythm.      Pulses: Normal pulses.      Heart sounds: Normal heart sounds. No murmur heard.  Pulmonary:      Effort: Pulmonary effort is normal.      Breath sounds: Normal breath sounds.   Musculoskeletal:         General: Tenderness present.      Cervical back: Normal range of motion and neck supple.   Skin:     Capillary Refill: Capillary refill takes less than 2 seconds.   Neurological:      Mental Status: She is alert and oriented to person, place, and time.   Psychiatric:         Mood and Affect: Mood normal.         Behavior: Behavior normal.          Statin Choice Calculator  Data Reviewed:         The data below has been reviewed by Seema Connell MD on 12/20/2022.      Lab Results   Component Value Date    BUN 21 12/20/2022    CREATININE 0.74 12/20/2022     12/20/2022    K 3.9 12/20/2022     12/20/2022    CALCIUM 9.3 12/20/2022    ALBUMIN 4.20 12/20/2022    BILITOT 0.3 12/20/2022    ALKPHOS 61 12/20/2022    AST 19 12/20/2022    ALT 15 12/20/2022    TRIG 101 12/20/2022    HDL 72 (H) 12/20/2022    VLDL 18 12/20/2022     (H) 12/20/2022    LDLHDL 1.37 12/20/2022    WBC 5.05 10/11/2022    RBC 4.40 10/11/2022    HCT 40.4 10/11/2022    MCV 91.8 10/11/2022    MCH 31.6 10/11/2022    TSH 1.880 10/11/2022    FREET4 0.96 10/11/2022    RIXF44LU 69.6 12/20/2022      Assessment & Plan   Order Review Tab  Health Maintenance Tab  Patient Plan/Order Tab  Diagnoses and all orders for this visit:    1. Poisoning by vitamin D, accidental or unintentional, subsequent encounter (Primary)  -     Vitamin  D,25-Hydroxy    2. Elevated lipoprotein(a)  -     Lipid Panel  -     Comprehensive Metabolic Panel    3. Vitamin D deficiency, unspecified  -     Vitamin D,25-Hydroxy    4. Sjogren's syndrome with keratoconjunctivitis sicca (HCC)  Assessment & Plan:  Will try plaquenil with all her symtpoms      Other orders  -     sertraline (Zoloft) 100 MG tablet; Take 1 tablet by mouth Daily.  Dispense: 30 tablet; Refill: 4  -     hydroxychloroquine (PLAQUENIL) 200 MG tablet; Take 1 tablet by mouth Daily for 90 days. Indications: Sjogren's Syndrome  Dispense: 90 tablet; Refill: 2  1. Vitamin D deficiency  I will order laboratory tests today.    2. Depression  She will continue administering sertraline.    Wrapup Tab  Return if symptoms worsen or fail to improve.       They were informed of the diagnosis and treatment plan and directed to f/u for any further problems or concerns.    Transcribed from ambient dictation for Seema Connell MD by Destiny Guaman.  12/20/22   18:29 EST    Patient or patient representative verbalized consent to the visit recording.  I have personally performed the services described in this document as transcribed by the above individual, and it is both accurate and complete.  Seema Connell MD  12/23/2022  14:03 EST

## 2022-12-21 LAB
25(OH)D3 SERPL-MCNC: 69.6 NG/ML (ref 30–100)
ALBUMIN SERPL-MCNC: 4.2 G/DL (ref 3.5–5.2)
ALBUMIN/GLOB SERPL: 1.7 G/DL
ALP SERPL-CCNC: 61 U/L (ref 39–117)
ALT SERPL W P-5'-P-CCNC: 15 U/L (ref 1–33)
ANION GAP SERPL CALCULATED.3IONS-SCNC: 8.3 MMOL/L (ref 5–15)
AST SERPL-CCNC: 19 U/L (ref 1–32)
BILIRUB SERPL-MCNC: 0.3 MG/DL (ref 0–1.2)
BUN SERPL-MCNC: 21 MG/DL (ref 8–23)
BUN/CREAT SERPL: 28.4 (ref 7–25)
CALCIUM SPEC-SCNC: 9.3 MG/DL (ref 8.6–10.5)
CHLORIDE SERPL-SCNC: 106 MMOL/L (ref 98–107)
CHOLEST SERPL-MCNC: 191 MG/DL (ref 0–200)
CO2 SERPL-SCNC: 28.7 MMOL/L (ref 22–29)
CREAT SERPL-MCNC: 0.74 MG/DL (ref 0.57–1)
EGFRCR SERPLBLD CKD-EPI 2021: 81.9 ML/MIN/1.73
GLOBULIN UR ELPH-MCNC: 2.5 GM/DL
GLUCOSE SERPL-MCNC: 102 MG/DL (ref 65–99)
HDLC SERPL-MCNC: 72 MG/DL (ref 40–60)
LDLC SERPL CALC-MCNC: 101 MG/DL (ref 0–100)
LDLC/HDLC SERPL: 1.37 {RATIO}
POTASSIUM SERPL-SCNC: 3.9 MMOL/L (ref 3.5–5.2)
PROT SERPL-MCNC: 6.7 G/DL (ref 6–8.5)
SODIUM SERPL-SCNC: 143 MMOL/L (ref 136–145)
TRIGL SERPL-MCNC: 101 MG/DL (ref 0–150)
VLDLC SERPL-MCNC: 18 MG/DL (ref 5–40)

## 2022-12-23 PROBLEM — M35.01 SJOGREN'S SYNDROME WITH KERATOCONJUNCTIVITIS SICCA: Status: ACTIVE | Noted: 2022-12-23

## 2022-12-23 PROBLEM — E55.9 VITAMIN D DEFICIENCY, UNSPECIFIED: Status: ACTIVE | Noted: 2022-12-23

## 2022-12-23 PROBLEM — T45.2X1A POISONING BY VITAMIN D: Status: ACTIVE | Noted: 2022-12-23

## 2023-02-01 ENCOUNTER — OFFICE (AMBULATORY)
Dept: URBAN - METROPOLITAN AREA CLINIC 64 | Facility: CLINIC | Age: 83
End: 2023-02-01

## 2023-02-01 VITALS
HEIGHT: 66 IN | SYSTOLIC BLOOD PRESSURE: 128 MMHG | HEART RATE: 75 BPM | WEIGHT: 193 LBS | DIASTOLIC BLOOD PRESSURE: 86 MMHG

## 2023-02-01 DIAGNOSIS — R11.0 NAUSEA: ICD-10-CM

## 2023-02-01 DIAGNOSIS — Z79.82 LONG TERM (CURRENT) USE OF ASPIRIN: ICD-10-CM

## 2023-02-01 DIAGNOSIS — E78.00 PURE HYPERCHOLESTEROLEMIA, UNSPECIFIED: ICD-10-CM

## 2023-02-01 DIAGNOSIS — R01.1 CARDIAC MURMUR, UNSPECIFIED: ICD-10-CM

## 2023-02-01 DIAGNOSIS — Z95.2 PRESENCE OF PROSTHETIC HEART VALVE: ICD-10-CM

## 2023-02-01 DIAGNOSIS — R15.2 FECAL URGENCY: ICD-10-CM

## 2023-02-01 DIAGNOSIS — R19.4 CHANGE IN BOWEL HABIT: ICD-10-CM

## 2023-02-01 DIAGNOSIS — Z90.710 ACQUIRED ABSENCE OF BOTH CERVIX AND UTERUS: ICD-10-CM

## 2023-02-01 DIAGNOSIS — R15.9 FULL INCONTINENCE OF FECES: ICD-10-CM

## 2023-02-01 PROCEDURE — 99213 OFFICE O/P EST LOW 20 MIN: CPT | Performed by: NURSE PRACTITIONER

## 2023-02-01 RX ORDER — HYOSCYAMINE SULFATE EXTENDED-RELEASE 0.38 MG/1
0.75 TABLET ORAL
Qty: 60 | Refills: 11 | Status: ACTIVE
Start: 2023-02-01

## 2023-02-13 RX ORDER — AMITRIPTYLINE HYDROCHLORIDE 25 MG/1
TABLET, FILM COATED ORAL
Qty: 90 TABLET | Refills: 0 | OUTPATIENT
Start: 2023-02-13

## 2023-02-14 DIAGNOSIS — E78.41 ELEVATED LIPOPROTEIN(A): ICD-10-CM

## 2023-02-14 RX ORDER — EVOLOCUMAB 140 MG/ML
140 INJECTION, SOLUTION SUBCUTANEOUS
Qty: 2 ML | Refills: 3 | Status: SHIPPED | OUTPATIENT
Start: 2023-02-14

## 2023-02-14 NOTE — TELEPHONE ENCOUNTER
Caller: Brooke Molina    Relationship: Self    Best call back number: [phone]    Requested Prescriptions:   Requested Prescriptions     Pending Prescriptions Disp Refills   • Evolocumab (Repatha) solution prefilled syringe injection 2 mL 1        Pharmacy where request should be sent: [facility] [id number] - [place], [place] - [address] - [phone]  - [phone] FX     Additional details provided by patient: PATIENT WAS ON SAMPLES, NEEDS TO CONTINUE     Does the patient have less than a 3 day supply:  [x] Yes  [] No    Would you like a call back once the refill request has been completed: [x] Yes [] No    If the office needs to give you a call back, can they leave a voicemail: AIYANA Yes [] No    Maryjo Stallworth Rep   02/14/23 11:01 EST

## 2023-04-03 ENCOUNTER — OFFICE (AMBULATORY)
Dept: URBAN - METROPOLITAN AREA CLINIC 64 | Facility: CLINIC | Age: 83
End: 2023-04-03

## 2023-04-03 VITALS
HEART RATE: 67 BPM | HEIGHT: 66 IN | DIASTOLIC BLOOD PRESSURE: 66 MMHG | WEIGHT: 189 LBS | SYSTOLIC BLOOD PRESSURE: 97 MMHG

## 2023-04-03 DIAGNOSIS — K21.9 GASTRO-ESOPHAGEAL REFLUX DISEASE WITHOUT ESOPHAGITIS: ICD-10-CM

## 2023-04-03 DIAGNOSIS — R19.4 CHANGE IN BOWEL HABIT: ICD-10-CM

## 2023-04-03 DIAGNOSIS — R15.9 FULL INCONTINENCE OF FECES: ICD-10-CM

## 2023-04-03 PROCEDURE — 99212 OFFICE O/P EST SF 10 MIN: CPT | Performed by: NURSE PRACTITIONER

## 2023-05-15 RX ORDER — AMITRIPTYLINE HYDROCHLORIDE 25 MG/1
25 TABLET, FILM COATED ORAL NIGHTLY
Qty: 90 TABLET | Refills: 0 | Status: SHIPPED | OUTPATIENT
Start: 2023-05-15

## 2023-05-15 RX ORDER — AMITRIPTYLINE HYDROCHLORIDE 25 MG/1
TABLET, FILM COATED ORAL
Qty: 90 TABLET | Refills: 0 | OUTPATIENT
Start: 2023-05-15

## 2023-05-15 NOTE — TELEPHONE ENCOUNTER
Caller: Brooke Molina AMILCAR    Relationship: Self    Best call back number: [phone]  Requested Prescriptions:   Requested Prescriptions     Pending Prescriptions Disp Refills   • amitriptyline (ELAVIL) 25 MG tablet 90 tablet 0     Sig: Take 1 tablet by mouth Every Night.        Pharmacy where request should be sent: [facility] [id number]  [place], [place] - [address] [place] [address] DR - [phone]  - [phone] FX     Last office visit with prescribing clinician: 12/20/2022   Last telemedicine visit with prescribing clinician: 2/14/2023   Next office visit with prescribing clinician: Visit date not found       Does the patient have less than a 3 day supply:  [x] Yes  [] No    Would you like a call back once the refill request has been completed: [] Yes [] No    If the office needs to give you a call back, can they leave a voicemail: [] Yes [] No    Maryjo Matos Rep   05/15/23 11:33 EDT

## 2023-05-17 RX ORDER — SERTRALINE HYDROCHLORIDE 100 MG/1
TABLET, FILM COATED ORAL
Qty: 180 TABLET | Refills: 1 | Status: SHIPPED | OUTPATIENT
Start: 2023-05-17

## 2023-05-30 ENCOUNTER — OFFICE VISIT (OUTPATIENT)
Dept: FAMILY MEDICINE CLINIC | Facility: CLINIC | Age: 83
End: 2023-05-30

## 2023-05-30 VITALS — OXYGEN SATURATION: 98 % | SYSTOLIC BLOOD PRESSURE: 130 MMHG | HEART RATE: 82 BPM | DIASTOLIC BLOOD PRESSURE: 79 MMHG

## 2023-05-30 DIAGNOSIS — R05.1 ACUTE COUGH: ICD-10-CM

## 2023-05-30 DIAGNOSIS — B37.0 ORAL THRUSH: Primary | ICD-10-CM

## 2023-05-30 PROCEDURE — 99213 OFFICE O/P EST LOW 20 MIN: CPT | Performed by: STUDENT IN AN ORGANIZED HEALTH CARE EDUCATION/TRAINING PROGRAM

## 2023-05-30 PROCEDURE — 3075F SYST BP GE 130 - 139MM HG: CPT | Performed by: STUDENT IN AN ORGANIZED HEALTH CARE EDUCATION/TRAINING PROGRAM

## 2023-05-30 PROCEDURE — 3078F DIAST BP <80 MM HG: CPT | Performed by: STUDENT IN AN ORGANIZED HEALTH CARE EDUCATION/TRAINING PROGRAM

## 2023-05-30 NOTE — PROGRESS NOTES
"Chief Complaint  Chief Complaint   Patient presents with   • Cough     Subjective        Brooke Molina is a 82 y.o. female who presents to [facility].    History of Present Illness  Having cough, sinus congestion and mouth issues.  Thought the cough and congestion would get better, it seems to slowly be getting better.    Yesterday started having white coating on top of tongue and what she thought were blisters underneath her tongue.  Her mouth is very sensitivie to the point she is having difficulty eating things d/t discomfort.    She started hydroxychloroquine for possible sjogren's.  She has been taking it for a limited amount of time.      Objective   /79   Pulse 82   SpO2 98%     Estimated body mass index is 32.45 kg/m² as calculated from the following:    Height as of 12/20/22: 166.4 cm (65.5\").    Weight as of 12/20/22: 89.8 kg (198 lb).     Physical Exam   GEN: In no acute distress, non toxic appearing  HEENT: Pupils equal and reactive to light, sclera clear. Mucous membranes moist. Oropharynx without erythema or exudate. No cervical or submandibular lymphadenopathy.  CV: Regular rate and rhythm, no murmurs, 2+ peripheral pulses, No extremity edema.   RESP: Lungs clear to auscultation anteriorly and posteriorly in all lung fields bilaterally.    Result Review :              Assessment and Plan     There are no diagnoses linked to this encounter.        Follow Up     No follow-ups on file.      "

## 2023-05-30 NOTE — PROGRESS NOTES
"Chief Complaint  Chief Complaint   Patient presents with   • Cough     Subjective        Brooke Molina is a 82 y.o. female who presents to [facility].    History of Present Illness  Cough and mouth soreness.      Objective   /79   Pulse 82   SpO2 98%     Estimated body mass index is 32.45 kg/m² as calculated from the following:    Height as of 12/20/22: 166.4 cm (65.5\").    Weight as of 12/20/22: 89.8 kg (198 lb).     Physical Exam   GEN: In no acute distress, non toxic appearing  HEENT: Pupils equal and reactive to light, sclera clear. Mucous membranes moist. Oropharynx without erythema or exudate. No cervical or submandibular lymphadenopathy.  Tms WNL bilaterally.  Tongue w/ scattered white patches    CV: Regular rate and rhythm  RESP: Lungs clear to auscultation anteriorly and posteriorly in all lung fields bilaterally.  Occasional cough.       Result Review :              Assessment and Plan     Diagnoses and all orders for this visit:    1. Oral thrush (Primary)  Possibly related to Sjogren's, hydroxychloroquine, recent URI.    Treat with nystatin.  Avoid abx for now even though she does have persistent cough to avoid worsening thrush.    Sip on fluids throughout day.    Update us if no improvement.    -     nystatin (MYCOSTATIN) 100,000 unit/mL suspension; Swish and swallow 5 mL 4 (Four) Times a Day for 7 days.  Dispense: 473 mL; Refill: 0    2. Acute cough  Continue otc support.  Mucinex, flonase, antihistamines, cough drops, fluid intake.      "

## 2023-06-10 DIAGNOSIS — E78.41 ELEVATED LIPOPROTEIN(A): ICD-10-CM

## 2023-06-12 ENCOUNTER — TREATMENT (OUTPATIENT)
Dept: PHYSICAL THERAPY | Facility: CLINIC | Age: 83
End: 2023-06-12
Payer: MEDICARE

## 2023-06-12 DIAGNOSIS — R53.1 WEAKNESS: ICD-10-CM

## 2023-06-12 DIAGNOSIS — M47.816 SPONDYLOSIS OF LUMBAR SPINE: ICD-10-CM

## 2023-06-12 DIAGNOSIS — M54.16 RADICULOPATHY, LUMBAR REGION: ICD-10-CM

## 2023-06-12 DIAGNOSIS — M54.51 VERTEBROGENIC LOW BACK PAIN: Primary | ICD-10-CM

## 2023-06-12 PROCEDURE — 97162 PT EVAL MOD COMPLEX 30 MIN: CPT | Performed by: PHYSICAL THERAPIST

## 2023-06-12 RX ORDER — EVOLOCUMAB 140 MG/ML
INJECTION, SOLUTION SUBCUTANEOUS
Qty: 2 ML | Refills: 3 | Status: SHIPPED | OUTPATIENT
Start: 2023-06-12

## 2023-06-12 NOTE — PROGRESS NOTES
Physical Therapy Initial Evaluation and Plan of Care  [address] [place] [address]     Patient: Brooke Molina   : 1940  Diagnosis/ICD-10 Code:  Vertebrogenic low back pain [M54.51]  Referring practitioner: Sumi Espino*  Date of Initial Visit: 2023  Today's Date: 2023  Patient seen for 1 sessions           Visit Diagnoses:    ICD-10-CM ICD-9-CM   1. Vertebrogenic low back pain  M54.51 724.2   2. Spondylosis of lumbar spine  M47.816 721.3   3. Radiculopathy, lumbar region  M54.16 724.4   4. Weakness  R53.1 780.79       Subjective Questionnaire: Oswestry: 28%      Subjective 83 yo female with c/o LBP, L LE pain, and bilateral LE weakness. Pt feels her pain is manageable, primarily limited by LE weakness. Reports significant decrease in function vs prior to COVID pandemic. Was able to go on a hiking trip last October, which went relatively well. Pt feels LE weakness has worsened since that point. Pt recently tripped and fell on her porch, landing on her knees. Denies dizziness and LE numbness/tingling. Symptoms worsen after prolonged sitting, getting out of the car. Alleviating and further exacerbating factors are unknown. Goes on ~1/2 mile walks regularly. Weakness worsens with activity. 0/10 pain now and 7/10 at worst. Primary LBP is along the central belt line. Pt notes dx of Sjogren's syndrome but has not seen a rheumatologist.  Social hx: Lives alone, recently lost spouse. Planning on going on hiking trip in October.      Objective          Neurological Testing     Sensation     Lumbar   Left   Intact: light touch    Right   Intact: light touch    Active Range of Motion     Lumbar   Normal active range of motion    Strength/Myotome Testing     Left Hip   Planes of Motion   Flexion: 4  Extension: 4  Abduction: 4    Right Hip   Planes of Motion   Flexion: 4  Extension: 4  Abduction: 4    Left Knee   Flexion: 4  Extension: 4    Right Knee   Flexion:  4  Extension: 4    Left Ankle/Foot   Dorsiflexion: 4    Right Ankle/Foot   Dorsiflexion: 4    Tests     Lumbar     Left   Negative femoral stretch and passive SLR.     Right   Negative femoral stretch and passive SLR.     Additional Tests Details  Further provocative testing is negative    Timed up and go: 10.2 seconds  Five times sit to stand: 18.7 seconds using Ues to assist  RBC, level 10 x 8.5 min non provocative of LE symptoms    Wide CHERYL with ambulation. Unable to single leg stand, tandem standing is wfl.  Assessment & Plan     Assessment  Impairments: abnormal gait, activity intolerance, impaired balance, impaired physical strength, lacks appropriate home exercise program and pain with function  Functional Limitations: carrying objects, lifting, walking, pulling, pushing, uncomfortable because of pain, standing and unable to perform repetitive tasks  Assessment details: 83 yo female with c/o LBP and bilateral LE weakness. Pt tolerated evaluation well and would benefit from further evaluation and treatment to address her impairments, primarily working on LE strengthening and functional mobility. Pt with dx of Sjogren's syndrome per her report and has not seen a rheumatologist.   Prognosis: good    Goals  Plan Goals: Short term goals, 1 week: Tolerate HEP progression.  Voice compliance with activity modification.  Report improvement in symptoms.     LTGs, 6 weeks: Improve KE score to 20%.  LE strength to be 4+/5 or better throughout to allow performance of standing activities.  Improve five times sit to stand to 15 seconds.  Tolerate 15 min or more of standing activity without limitation due to LE weakness.  Independent with HEP.    Plan  Therapy options: will be seen for skilled therapy services  Other planned modality interventions: modalities prn  Planned therapy interventions: balance/weight-bearing training, flexibility, functional ROM exercises, gait training, home exercise program, manual therapy,  neuromuscular re-education, strengthening, stretching and therapeutic activities  Frequency: 2x week  Duration in weeks: 6  Treatment plan discussed with: patient  Plan details: 30 visits per POC, 90 day certification period     History # of Personal Factors and/or Comorbidities: HIGH (3+)  Examination of Body System(s): # of elements: MODERATE (3)  Clinical Presentation: EVOLVING  Clinical Decision Making: MODERATE     Timed:         Manual Therapy:         mins  36937;     Therapeutic Exercise:         mins  20130;     Neuromuscular Leeroy:        mins  48750;    Therapeutic Activity:          mins  59161;     Gait Training:           mins  83725;     Ultrasound:          mins  15696;    Ionto                                   mins   07023  Self Care                            mins   99108    Un-Timed:  Electrical Stimulation:         mins  39195 ( );  Traction          mins 11545  Low Eval          Mins  90072  Mod Eval     45     Mins  59132  High Eval                            Mins  28395  Re-Eval                               mins  10744        Timed Treatment:   0   mins   Total Treatment:     45   mins      PT SIGNATURE: Jens See, PT, DPT, OCS  [place] license: [id number]  DATE TREATMENT INITIATED: 6/12/2023    Certification Period: @TDA @thru 9/9/2023  I certify that the therapy services are furnished while this patient is under my care.  The services outlined above are required for this patient and will be reviewed every 90 days.     PHYSICIAN: _____________________________    Sumi Espino,       DATE:     Please sign and return via fax to [unfilled] . Thank you, [facility].

## 2023-06-16 RX ORDER — TOPIRAMATE 100 MG/1
TABLET, FILM COATED ORAL
Qty: 90 TABLET | Refills: 2 | Status: SHIPPED | OUTPATIENT
Start: 2023-06-16

## 2023-08-02 ENCOUNTER — TREATMENT (OUTPATIENT)
Dept: PHYSICAL THERAPY | Facility: CLINIC | Age: 83
End: 2023-08-02
Payer: MEDICARE

## 2023-08-02 DIAGNOSIS — M54.51 VERTEBROGENIC LOW BACK PAIN: Primary | ICD-10-CM

## 2023-08-02 DIAGNOSIS — M47.816 SPONDYLOSIS OF LUMBAR SPINE: ICD-10-CM

## 2023-08-02 PROCEDURE — 97110 THERAPEUTIC EXERCISES: CPT | Performed by: PHYSICAL THERAPIST

## 2023-08-02 PROCEDURE — 97112 NEUROMUSCULAR REEDUCATION: CPT | Performed by: PHYSICAL THERAPIST

## 2023-08-04 ENCOUNTER — TREATMENT (OUTPATIENT)
Dept: PHYSICAL THERAPY | Facility: CLINIC | Age: 83
End: 2023-08-04
Payer: MEDICARE

## 2023-08-04 DIAGNOSIS — M47.816 SPONDYLOSIS OF LUMBAR SPINE: ICD-10-CM

## 2023-08-04 DIAGNOSIS — M54.51 VERTEBROGENIC LOW BACK PAIN: Primary | ICD-10-CM

## 2023-08-04 PROCEDURE — 97110 THERAPEUTIC EXERCISES: CPT | Performed by: PHYSICAL THERAPIST

## 2023-08-04 PROCEDURE — 97112 NEUROMUSCULAR REEDUCATION: CPT | Performed by: PHYSICAL THERAPIST

## 2023-08-07 ENCOUNTER — OFFICE (AMBULATORY)
Dept: URBAN - METROPOLITAN AREA CLINIC 64 | Facility: CLINIC | Age: 83
End: 2023-08-07

## 2023-08-07 VITALS
DIASTOLIC BLOOD PRESSURE: 76 MMHG | WEIGHT: 172 LBS | SYSTOLIC BLOOD PRESSURE: 112 MMHG | HEART RATE: 75 BPM | HEIGHT: 66 IN

## 2023-08-07 DIAGNOSIS — R05.3 CHRONIC COUGH: ICD-10-CM

## 2023-08-07 DIAGNOSIS — R11.0 NAUSEA: ICD-10-CM

## 2023-08-07 DIAGNOSIS — R19.4 CHANGE IN BOWEL HABIT: ICD-10-CM

## 2023-08-07 PROCEDURE — 99213 OFFICE O/P EST LOW 20 MIN: CPT | Performed by: NURSE PRACTITIONER

## 2023-08-09 ENCOUNTER — TREATMENT (OUTPATIENT)
Dept: PHYSICAL THERAPY | Facility: CLINIC | Age: 83
End: 2023-08-09
Payer: MEDICARE

## 2023-08-09 DIAGNOSIS — M54.51 VERTEBROGENIC LOW BACK PAIN: Primary | ICD-10-CM

## 2023-08-09 DIAGNOSIS — R53.1 WEAKNESS: ICD-10-CM

## 2023-08-09 DIAGNOSIS — M47.816 SPONDYLOSIS OF LUMBAR SPINE: ICD-10-CM

## 2023-08-09 DIAGNOSIS — M54.16 RADICULOPATHY, LUMBAR REGION: ICD-10-CM

## 2023-08-09 PROCEDURE — 97110 THERAPEUTIC EXERCISES: CPT | Performed by: PHYSICAL THERAPIST

## 2023-08-09 PROCEDURE — 97112 NEUROMUSCULAR REEDUCATION: CPT | Performed by: PHYSICAL THERAPIST

## 2023-08-09 NOTE — PROGRESS NOTES
Physical Therapy Daily Progress Note      Patient: Brooke Molina   : 1940  Diagnosis/ICD-10 Code:  Vertebrogenic low back pain [M54.51]  Referring practitioner: Sumi Espino*  Date of Initial Visit: Type: THERAPY  Noted: 2023  Today's Date: 2023  Patient seen for 4 sessions             Subjective Pt continues to feel better since her injection 2 weeks ago but has noticed some weakness on a couple of occasions    Objective   See Exercise, Manual, and Modality Logs for complete treatment.       Assessment/Plan  Good effort and tolerance with exercises today but did have slight reports of fatigue by end of session.  Progress strengthening as tolerated    Progress per Plan of Care           Timed:           Therapeutic Exercise:    15     mins  54470;     Neuromuscular Leeroy:    15    mins  62705;        Timed Treatment:   30   mins   Total Treatment:     30   mins        Candido Shore PTA  Physical Therapist Assistant

## 2023-08-11 ENCOUNTER — TREATMENT (OUTPATIENT)
Dept: PHYSICAL THERAPY | Facility: CLINIC | Age: 83
End: 2023-08-11
Payer: MEDICARE

## 2023-08-11 DIAGNOSIS — M47.816 SPONDYLOSIS OF LUMBAR SPINE: ICD-10-CM

## 2023-08-11 DIAGNOSIS — M54.51 VERTEBROGENIC LOW BACK PAIN: Primary | ICD-10-CM

## 2023-08-11 DIAGNOSIS — M54.16 RADICULOPATHY, LUMBAR REGION: ICD-10-CM

## 2023-08-11 DIAGNOSIS — R53.1 WEAKNESS: ICD-10-CM

## 2023-08-11 PROCEDURE — 97112 NEUROMUSCULAR REEDUCATION: CPT | Performed by: PHYSICAL THERAPIST

## 2023-08-11 PROCEDURE — 97110 THERAPEUTIC EXERCISES: CPT | Performed by: PHYSICAL THERAPIST

## 2023-08-11 NOTE — PROGRESS NOTES
Physical Therapy Daily Treatment Note  [facility]  [address] [place] [address]     Patient: Brooke Molina   : 1940   Referring practitioner: Sumi Espino*  Date of initial visit: Type: THERAPY  Noted: 2023   Today's date: 2023   Patient seen for 5 visits    Visit Diagnoses:    ICD-10-CM ICD-9-CM   1. Vertebrogenic low back pain  M54.51 724.2   2. Spondylosis of lumbar spine  M47.816 721.3   3. Radiculopathy, lumbar region  M54.16 724.4   4. Weakness  R53.1 780.79       Subjective   Pt reports: LBP remains improved. LE strength still limited. HEP going well, pt wants to work toward return to her gym.      Objective     See Exercise, Manual, and Modality Logs for complete treatment.     Patient Education: HEP    Assessment/Plan LEs fatigued post visit, will continue working toward return to gym.      Progress per Plan of Care            Timed:         Manual Therapy:         mins  68672;     Therapeutic Exercise:    15     mins  70224;     Neuromuscular Leeroy:    15    mins  39401;    Therapeutic Activity:          mins  65819;     Gait Training:           mins  58418;     Ultrasound:          mins  61860;    Ionto                                   mins   90705  Self Care                            mins   45650    Un-Timed:  Electrical Stimulation:         mins  51066 ( );  Traction          mins 88390  Low Eval          Mins  18270  Mod Eval          Mins  04099  High Eval                            Mins  31230  Re-Eval                               mins  93040    Timed Treatment:   30   mins   Total Treatment:     30   mins      Jens See, PT, DPT, OCS  [place] license: [id number]  Physical Therapist

## 2023-08-14 RX ORDER — AMITRIPTYLINE HYDROCHLORIDE 25 MG/1
TABLET, FILM COATED ORAL
Qty: 30 TABLET | Refills: 0 | Status: SHIPPED | OUTPATIENT
Start: 2023-08-14

## 2023-08-15 ENCOUNTER — TREATMENT (OUTPATIENT)
Dept: PHYSICAL THERAPY | Facility: CLINIC | Age: 83
End: 2023-08-15
Payer: MEDICARE

## 2023-08-15 DIAGNOSIS — M54.51 VERTEBROGENIC LOW BACK PAIN: Primary | ICD-10-CM

## 2023-08-15 DIAGNOSIS — M47.816 SPONDYLOSIS OF LUMBAR SPINE: ICD-10-CM

## 2023-08-15 PROCEDURE — 97110 THERAPEUTIC EXERCISES: CPT | Performed by: PHYSICAL THERAPIST

## 2023-08-15 PROCEDURE — 97112 NEUROMUSCULAR REEDUCATION: CPT | Performed by: PHYSICAL THERAPIST

## 2023-08-15 NOTE — PROGRESS NOTES
Physical Therapy Daily Progress Note      Patient: Brooke Molina   : 1940  Diagnosis/ICD-10 Code:  Vertebrogenic low back pain [M54.51]  Referring practitioner: Sumi Espino*  Date of Initial Visit: Type: THERAPY  Noted: 2023  Today's Date: 8/15/2023  Patient seen for 6 sessions             Subjective Pt is frustrated and discouraged.  She said she had a bad day yesterday, saying her legs became very weak and would have possibly fallen if she didn't have something to hold on to.    Objective   See Exercise, Manual, and Modality Logs for complete treatment.       Assessment/Plan  Good effort and overall good tolerance with progression and addition of exercises today, having some c/o glut soreness with t-band resistance sidestepping    Progress per Plan of Care           Timed:           Therapeutic Exercise:    15     mins  36806;     Neuromuscular Leeroy:    15    mins  74980;        Timed Treatment:   30   mins   Total Treatment:     30   mins        Candido Shore PTA  Physical Therapist Assistant

## 2023-08-18 ENCOUNTER — TREATMENT (OUTPATIENT)
Dept: PHYSICAL THERAPY | Facility: CLINIC | Age: 83
End: 2023-08-18
Payer: MEDICARE

## 2023-08-18 DIAGNOSIS — M54.16 RADICULOPATHY, LUMBAR REGION: ICD-10-CM

## 2023-08-18 DIAGNOSIS — M47.816 SPONDYLOSIS OF LUMBAR SPINE: ICD-10-CM

## 2023-08-18 DIAGNOSIS — M54.51 VERTEBROGENIC LOW BACK PAIN: Primary | ICD-10-CM

## 2023-08-18 PROCEDURE — 97110 THERAPEUTIC EXERCISES: CPT | Performed by: PHYSICAL THERAPIST

## 2023-08-18 PROCEDURE — 97112 NEUROMUSCULAR REEDUCATION: CPT | Performed by: PHYSICAL THERAPIST

## 2023-08-18 NOTE — PROGRESS NOTES
Physical Therapy Daily Treatment Note  [facility]  [address] [place] [address]     Patient: Brooke Molina   : 1940   Referring practitioner: Sumi Espino*  Date of initial visit: Type: THERAPY  Noted: 2023   Today's date: 2023   Patient seen for 7 visits    Visit Diagnoses:    ICD-10-CM ICD-9-CM   1. Vertebrogenic low back pain  M54.51 724.2   2. Spondylosis of lumbar spine  M47.816 721.3   3. Radiculopathy, lumbar region  M54.16 724.4       Subjective   Pt reports: Feeling a little stronger. Pain symptoms are improved, primarily limited by remaining strength/endurance deficits.       Objective     See Exercise, Manual, and Modality Logs for complete treatment.     Patient Education: HEP    Assessment/Plan LEs fatigued post visit.      Progress per Plan of Care            Timed:         Manual Therapy:         mins  75255;     Therapeutic Exercise:    15     mins  29412;     Neuromuscular Leeroy:    15    mins  17639;    Therapeutic Activity:          mins  52450;     Gait Training:           mins  43349;     Ultrasound:          mins  06495;    Ionto                                   mins   10675  Self Care                            mins   07827    Un-Timed:  Electrical Stimulation:         mins  03244 ( );  Traction          mins 43651  Low Eval          Mins  82038  Mod Eval          Mins  19775  High Eval                            Mins  57389  Re-Eval                               mins  90515    Timed Treatment:   30   mins   Total Treatment:     30   mins      Jens See, PT, DPT, OCS  [place] license: [id number]  Physical Therapist

## 2023-09-25 RX ORDER — AMITRIPTYLINE HYDROCHLORIDE 25 MG/1
TABLET, FILM COATED ORAL
Qty: 30 TABLET | Refills: 0 | Status: SHIPPED | OUTPATIENT
Start: 2023-09-25

## 2023-10-16 RX ORDER — AMITRIPTYLINE HYDROCHLORIDE 25 MG/1
TABLET, FILM COATED ORAL
Qty: 90 TABLET | Refills: 0 | Status: SHIPPED | OUTPATIENT
Start: 2023-10-16

## 2023-10-24 ENCOUNTER — OFFICE VISIT (OUTPATIENT)
Dept: CARDIOLOGY | Facility: CLINIC | Age: 83
End: 2023-10-24
Payer: MEDICARE

## 2023-10-24 VITALS
RESPIRATION RATE: 18 BRPM | HEIGHT: 66 IN | BODY MASS INDEX: 26.68 KG/M2 | WEIGHT: 166 LBS | SYSTOLIC BLOOD PRESSURE: 123 MMHG | HEART RATE: 73 BPM | DIASTOLIC BLOOD PRESSURE: 79 MMHG

## 2023-10-24 DIAGNOSIS — I10 ESSENTIAL HYPERTENSION: Primary | ICD-10-CM

## 2023-10-24 PROCEDURE — 93000 ELECTROCARDIOGRAM COMPLETE: CPT | Performed by: INTERNAL MEDICINE

## 2023-10-24 PROCEDURE — 3078F DIAST BP <80 MM HG: CPT | Performed by: INTERNAL MEDICINE

## 2023-10-24 PROCEDURE — 99214 OFFICE O/P EST MOD 30 MIN: CPT | Performed by: INTERNAL MEDICINE

## 2023-10-24 PROCEDURE — 3074F SYST BP LT 130 MM HG: CPT | Performed by: INTERNAL MEDICINE

## 2023-10-24 RX ORDER — PANTOPRAZOLE SODIUM 40 MG/1
40 TABLET, DELAYED RELEASE ORAL AS NEEDED
COMMUNITY

## 2023-10-24 RX ORDER — HYDROCHLOROTHIAZIDE 25 MG/1
25 TABLET ORAL DAILY
COMMUNITY
Start: 2023-08-11

## 2023-10-24 RX ORDER — LEVOCETIRIZINE DIHYDROCHLORIDE 5 MG/1
TABLET, FILM COATED ORAL
COMMUNITY
Start: 2023-08-21

## 2023-10-24 NOTE — PROGRESS NOTES
Cardiology Clinic Note  Evert Bernstein MD, PhD    Subjective:     Encounter Date:10/24/2023      Patient ID: Brooke Molina is a 83 y.o. female.    Chief Complaint:  Chief Complaint   Patient presents with    Follow-up       HPI:      I the pleasure to see this very pleasant 83-year-old female in follow-up today established with cardiology with history of aortic valve stenosis and mitral valve insufficiency, paroxysmal atrial fibrillation hypertension hyperlipidemia, last stress test and echo evaluation was in March 2021.  She received valve replacement with 21 mm bioprosthetic stentless Mckeon valve previously which was seen to be normal functioning.  EF 55 to 60% with mild to moderate concentric LVH, grade 1 diastolic dysfunction.  She is also had ABIs in the last 12 months which demonstrated normal bilateral ABIs no evidence of obstructive disease.  Stress testing in March 2021 revealed an EF of 55%, normal myocardial perfusion with a low risk study.  She presents back today otherwise blood pressure well controlled 120 systolic heart rates in the 70s.  She is doing well.  She is in sinus rhythm today with nonspecific ST-T wave abnormalities in the precordial leads.  She denies any chest pain shortness of breath and otherwise says she is doing well, she is euvolemic with no heart failure signs or symptoms.  She has 30 pounds of intentional weight loss over the last year.  She unfortunately lost her  in January 2023 with normal bereavement at this time.  She denies any new unstable angina heart failure center symptoms unexplained syncope palpitations or other complaints.  We discussed repeat 2D echo next year for evaluation of her AVR remotely 2013, no echo in the last 3 to 4 years for reassessment.     Review of systems otherwise negative x14 point review of systems except was mentioned above        Historical data copied forward from previous encounters in EMR including the history, exam, and  assessment/plan has been reviewed and is unchanged unless noted otherwise.     Cardiac medicines reviewed with risk, benefits, and necessity of each discussed.     Risk and benefit of cardiac testing reviewed including death heart attack stroke pain bleeding infection need for vascular /cardiovascular surgery were discussed and the patient      Objective:      Vitals reviewed below     Physical Exam  Regular rate and rhythm with no rubs gallops heave or lift  1 out of 6 systolic ejection murmur lower sternal border unchanged  Sternotomy clean and dry  Well-healed  Radial pulses 2+ equal bilaterally next normal cap refill  No carotid bruits or JVD  Clear to auscultation     Assessment:         Assessment   Diagnoses and all orders for this visit:     1. Rheumatic aortic valve insufficiency (Primary)  -     ECG 12 Lead     2. Essential hypertension  -     ECG 12 Lead     3. Paroxysmal atrial fibrillation (HCC)  -     ECG 12 Lead     4. Mixed hyperlipidemia  -     ECG 12 Lead     5. Palpitations  -     ECG 12 Lead        Severe aortic stenosis, bioprosthetic AVR remotely 2013, normal functioning  Aspirin for life  Antibiotic prophylaxis prior to any surgical procedure    No echo in the last 4 years, repeat 2D echo next year 2024 for assessment of bioprosthetic AVR     Paroxysmal A. fib, sinus rhythm, doing well without recurrence, aspirin, beta-blocker     Hyperlipidemia, Repatha on board, follow lipids previously was at goal no need for adjustment     Palpitations, asymptomatic presently, continue beta-blocker     Essential hypertension well-controlled 120 systolic today     History of PAD, normal ABIs within the last year     Diet and exercise per AHA guidelines, she is exercising regularly down nearly 30 pounds from prior encounter last year  Normal bereavement with loss of her  earlier this year 2023  Good functional status overall  EKG is unchanged  Low-cholesterol Mediterranean diet recommended  Routine  "activity  See her back in 1 year    Objective:         /79 (BP Location: Left arm, Patient Position: Sitting)   Pulse 73   Resp 18   Ht 166.4 cm (65.5\")   Wt 75.3 kg (166 lb)   BMI 27.20 kg/m²               The pleasure to be involved in this patient's cardiovascular care.  Please call with any questions or concerns  Evert Bernstein MD, PhD    Most recent EKG as reviewed and interpreted by me:    ECG 12 Lead    Date/Time: 10/24/2023 1:10 PM  Performed by: Evert Bernstein MD    Authorized by: Evert Bernstein MD  Comparison: not compared with previous ECG   Previous ECG: no previous ECG available  Rhythm: sinus rhythm  Rate: normal  QRS axis: normal  Other findings: non-specific ST-T wave changes and poor R wave progression    Clinical impression: abnormal EKG           Most recent echo as reviewed and interpreted by me:  Results for orders placed during the hospital encounter of 03/08/21    Adult Transthoracic Echo Complete W/ Cont if Necessary Per Protocol    Interpretation Summary  · Left ventricular systolic function is normal.  · Left ventricular ejection fraction is 55 to 60%  · Left ventricular wall thickness is consistent with mild to moderate concentric hypertrophy.  · Left ventricular diastolic function is consistent with (grade I) impaired relaxation.  · There is a bioprosthetic aortic valve present. It appears to be stentless bioprosthetic valve.      Most recent stress test as reviewed and interpreted by me:  Results for orders placed during the hospital encounter of 03/08/21    Stress Test With Myocardial Perfusion One Day    Interpretation Summary  · Findings consistent with an abnormal ECG stress test.  · Breast attenuation artifact is present.  · Left ventricular ejection fraction is normal. (Calculated EF = 57%).  · Myocardial perfusion imaging indicates a normal myocardial perfusion study with no evidence of ischemia.  · Impressions are consistent with a low risk " study.  · This is normal Cardiolite imaging stress test with no evidence of ischemia or myocardial infarction. Small apical thinning is noted which showed normal thickening and contractility consistent with attenuation artifact. Left ventricle size and function is normal on gated SPECT imaging. No wall motion abnormality was noted. Clinical correlation recommended. Further recommendation as per ordering physician..      Most recent cardiac catheterization as reviewed interpreted by me:  No results found for this or any previous visit.    The following portions of the patient's history were reviewed and updated as appropriate: allergies, current medications, past family history, past medical history, past social history, past surgical history, and problem list.      ROS:  14 point review of systems negative except as mentioned above    Current Outpatient Medications:     amitriptyline (ELAVIL) 25 MG tablet, TAKE ONE TABLET BY MOUTH ONCE NIGHTLY, Disp: 90 tablet, Rfl: 0    aspirin 81 MG chewable tablet, Chew 1 tablet Daily., Disp: , Rfl:     betamethasone dipropionate (DIPROLENE) 0.05 % lotion, Apply  topically to the appropriate area as directed 2 (Two) Times a Day. 2-3 times daily PRN, Disp: 60 mL, Rfl: 0    hydroCHLOROthiazide (HYDRODIURIL) 25 MG tablet, 1 tablet Daily., Disp: , Rfl:     levocetirizine (XYZAL) 5 MG tablet, , Disp: , Rfl:     metoprolol tartrate (LOPRESSOR) 50 MG tablet, Take 1 tablet by mouth 2 (Two) Times a Day., Disp: 180 tablet, Rfl: 3    multivitamin (THERAGRAN) tablet tablet, Take 1 tablet by mouth Daily., Disp: , Rfl:     nystatin (MYCOSTATIN) 100,000 unit/mL suspension, Swish and swallow 5 mL 4 (Four) Times a Day. (Patient taking differently: Swish and swallow 5 mL As Needed.), Disp: 480 mL, Rfl: 1    Omega-3 Fatty Acids (FISH OIL) 1000 MG capsule capsule, Take 1 capsule by mouth Daily With Breakfast., Disp: , Rfl:     ondansetron ODT (Zofran ODT) 4 MG disintegrating tablet, Place 1 tablet  on the tongue Every 8 (Eight) Hours As Needed for Nausea or Vomiting., Disp: 45 tablet, Rfl: 1    Psyllium (Metamucil) wafer wafer, Take  by mouth Daily., Disp: , Rfl:     Repatha solution prefilled syringe injection, INJECT 1 ML UNDER THE SKIN EVERY 14 DAYS AS DIRECTED, Disp: 2 mL, Rfl: 3    sertraline (ZOLOFT) 100 MG tablet, TAKE ONE TABLET BY MOUTH DAILY, Disp: 180 tablet, Rfl: 1    topiramate (TOPAMAX) 100 MG tablet, TAKE ONE TABLET BY MOUTH DAILY, Disp: 90 tablet, Rfl: 2    vitamin B-12 (CYANOCOBALAMIN) 500 MCG tablet, Take 1 tablet by mouth Daily., Disp: , Rfl:     vitamin C (ASCORBIC ACID) 250 MG tablet, Take 1 tablet by mouth Daily., Disp: , Rfl:     Zinc Sulfate (ZINC 15 PO), Take  by mouth., Disp: , Rfl:     hydroxychloroquine (PLAQUENIL) 200 MG tablet, Take  by mouth Daily. (Patient not taking: Reported on 10/24/2023), Disp: , Rfl:     pantoprazole (PROTONIX) 40 MG EC tablet, Take 1 tablet by mouth As Needed., Disp: , Rfl:     Problem List:  Patient Active Problem List   Diagnosis    Migraine with aura and without status migrainosus, not intractable    Transient cerebral ischemia    DDD (degenerative disc disease), lumbosacral    DJD (degenerative joint disease) of knee    GERD (gastroesophageal reflux disease)    Situational depression    Aortic insufficiency    Carotid bruit    Essential hypertension    Paroxysmal atrial fibrillation    Valvular heart disease    Vitamin D deficiency    Osteoarthritis    Gastroesophageal reflux disease    Mixed hyperlipidemia    Migraine headache    Temporary cerebral vascular dysfunction    Headache    Atypical migraine    Palpitations    Obesity (BMI 30-39.9)    Other chest pain    Paresthesia    Osteoarthrosis, localized, primary, involving shoulder region    Spinal stenosis of cervicothoracic region    PAD (peripheral artery disease)    Elevated lipoprotein(a)    Weakness of both legs    Infected insect bite of left arm    Lumbar radiculopathy    Thyroid nodule     Chronic fatigue    Vitamin D deficiency, unspecified    Poisoning by vitamin D    Sjogren's syndrome with keratoconjunctivitis sicca     Past Medical History:  Past Medical History:   Diagnosis Date    Aortic stenosis, severe     Arthritis     Atypical migraine 9/24/2019    Excessive daytime sleepiness     Hyperlipidemia     Hypertension     Measles     Migraine     Mitral valve insufficiency     Mild to Moderate--S/p MVR    Paroxysmal atrial fibrillation 11/4/2016    Thyroid disease     Valvular heart disease 6/19/2019    AV replacement  Tissue 2/5/2013     Past Surgical History:  Past Surgical History:   Procedure Laterality Date    AORTIC VALVE REPAIR/REPLACEMENT  2/5/13- [facility]    APPENDECTOMY      BREAST BIOPSY      CARDIAC CATHETERIZATION  08/24/12- [facility]    HYSTERECTOMY      REDUCTION MAMMAPLASTY      STERNOTOMY  02/5/13- [facility]    THYROID SURGERY       Social History:  Social History     Socioeconomic History    Marital status:     Number of children: 2   Tobacco Use    Smoking status: Never    Smokeless tobacco: Never   Vaping Use    Vaping Use: Never used   Substance and Sexual Activity    Alcohol use: Yes     Comment: Social    Drug use: No    Sexual activity: Defer     Birth control/protection: Surgical     Comment: Hyst     Allergies:  Allergies   Allergen Reactions    Codeine GI Intolerance     Immunizations:  Immunization History   Administered Date(s) Administered    COVID-19 (MODERNA) 1st,2nd,3rd Dose Monovalent 01/18/2021, 02/17/2021    COVID-19 (MODERNA) BIVALENT 12+YRS 03/22/2023    Fluzone High Dose =>65 Years ([employer] ONLY) 11/04/2013, 10/14/2019, 09/22/2020    Hepatitis A 04/21/2018, 12/20/2018    Pneumococcal Polysaccharide (PPSV23) 12/23/2019            In-Office Procedure(s):  No orders to display        ASCVD RIsk Score::  The ASCVD Risk score (Es HUGO, et al., 2019) failed to calculate for the following reasons:    The 2019 ASCVD risk score is only  valid for ages 40 to 79    The patient has a prior MI or stroke diagnosis    Imaging:    Results for orders placed in visit on 02/10/22    SCANNED - IMAGING       Results for orders placed during the hospital encounter of 08/28/19    CT Head Without Contrast    Narrative  CT HEAD WO CONTRAST-    Date of Exam: 8/29/2019 10:05 AM    Indication: Neuroendocrine sign / symptoms.    Comparison: None available.    Technique:  Without contrast, contiguous axial CT images of the head  were obtained from skull base to vertex.  Coronal and sagittal  reconstructions were performed.  Automated exposure control and  iterative reconstruction methods were used.    FINDINGS  No evidence of intracranial hemorrhage, mass or midline shift. Sulci and  ventricles are symmetric. Brain volume is appropriate for patient's age.  The gray-white matter differentiation is intact. The mastoid air cells  and paranasal sinuses are well aerated. Globes and extraocular muscles  are normal. No displaced or depressed skull fractures. No suspicious  lytic or sclerotic osseous lesions.    Impression  No acute intracranial abnormality. No change from previous examination.  Brain MRI is more sensitive to evaluate for acute or subacute infarcts  and to evaluate for intracranial metastatic disease.    Electronically Signed By-Bailey Lane On:8/29/2019 10:11 AM  This report was finalized on [id number] by  Bailey Lane, .      Results for orders placed during the hospital encounter of 08/28/19    CT Head Without Contrast    Narrative  CT HEAD WO CONTRAST-    Date of Exam: 8/29/2019 10:05 AM    Indication: Neuroendocrine sign / symptoms.    Comparison: None available.    Technique:  Without contrast, contiguous axial CT images of the head  were obtained from skull base to vertex.  Coronal and sagittal  reconstructions were performed.  Automated exposure control and  iterative reconstruction methods were used.    FINDINGS  No evidence of intracranial  hemorrhage, mass or midline shift. Sulci and  ventricles are symmetric. Brain volume is appropriate for patient's age.  The gray-white matter differentiation is intact. The mastoid air cells  and paranasal sinuses are well aerated. Globes and extraocular muscles  are normal. No displaced or depressed skull fractures. No suspicious  lytic or sclerotic osseous lesions.    Impression  No acute intracranial abnormality. No change from previous examination.  Brain MRI is more sensitive to evaluate for acute or subacute infarcts  and to evaluate for intracranial metastatic disease.    Electronically Signed By-Bailey Lane On:8/29/2019 10:11 AM  This report was finalized on [id number] by  Bailey Lane, .      Lab Review:   Office Visit on 06/21/2023   Component Date Value    C-Reactive Protein 06/21/2023 <0.30     Glucose 06/21/2023 94     BUN 06/21/2023 25 (H)     Creatinine 06/21/2023 0.75     Sodium 06/21/2023 144     Potassium 06/21/2023 4.2     Chloride 06/21/2023 109 (H)     CO2 06/21/2023 26.0     Calcium 06/21/2023 9.3     Total Protein 06/21/2023 6.5     Albumin 06/21/2023 4.2     ALT (SGPT) 06/21/2023 16     AST (SGOT) 06/21/2023 19     Alkaline Phosphatase 06/21/2023 47     Total Bilirubin 06/21/2023 0.2     Globulin 06/21/2023 2.3     A/G Ratio 06/21/2023 1.8     BUN/Creatinine Ratio 06/21/2023 33.3 (H)     Anion Gap 06/21/2023 9.0     eGFR 06/21/2023 80.6     TSH 06/21/2023 2.750     WBC 06/21/2023 3.49     RBC 06/21/2023 4.07     Hemoglobin 06/21/2023 12.7     Hematocrit 06/21/2023 37.7     MCV 06/21/2023 92.6     MCH 06/21/2023 31.2     MCHC 06/21/2023 33.7     RDW 06/21/2023 12.1 (L)     RDW-SD 06/21/2023 41.3     MPV 06/21/2023 11.1     Platelets 06/21/2023 166     Neutrophil % 06/21/2023 42.7     Lymphocyte % 06/21/2023 41.3     Monocyte % 06/21/2023 9.2     Eosinophil % 06/21/2023 5.7     Basophil % 06/21/2023 1.1     Immature Grans % 06/21/2023 0.0     Neutrophils, Absolute 06/21/2023 1.49 (L)      Lymphocytes, Absolute 06/21/2023 1.44     Monocytes, Absolute 06/21/2023 0.32     Eosinophils, Absolute 06/21/2023 0.20     Basophils, Absolute 06/21/2023 0.04     Immature Grans, Absolute 06/21/2023 0.00     nRBC 06/21/2023 0.0     25 Hydroxy, Vitamin D 06/21/2023 64.5      Recent labs reviewed and interpreted for clinical significance and application            Level of Care:           Evert Bernstein MD  10/24/23  .

## 2023-11-03 ENCOUNTER — TRANSCRIBE ORDERS (OUTPATIENT)
Dept: SPEECH THERAPY | Facility: HOSPITAL | Age: 83
End: 2023-11-03
Payer: MEDICARE

## 2023-11-03 DIAGNOSIS — R49.0 HOARSE: Primary | ICD-10-CM

## 2023-11-14 ENCOUNTER — HOSPITAL ENCOUNTER (OUTPATIENT)
Dept: SPEECH THERAPY | Facility: HOSPITAL | Age: 83
Discharge: HOME | End: 2023-11-14
Admitting: OTOLARYNGOLOGY
Payer: MEDICARE

## 2023-11-14 ENCOUNTER — HOSPITAL ENCOUNTER (OUTPATIENT)
Dept: SPEECH THERAPY | Facility: HOSPITAL | Age: 83
Discharge: HOME | End: 2023-11-14
Payer: MEDICARE

## 2023-11-14 DIAGNOSIS — R49.0 HOARSE: ICD-10-CM

## 2023-11-14 PROCEDURE — 92524 BEHAVRAL QUALIT ANALYS VOICE: CPT

## 2023-11-14 PROCEDURE — 31579 LARYNGOSCOPY TELESCOPIC: CPT

## 2023-11-14 NOTE — THERAPY EVALUATION
11/14/23Patient Info    Name: Brooke Molina   Gender: female     YOB: 1940   Referring Physician: Dr. Mauricio Mcgill   Original Complaint: Hoarseness/Raspy Voice   Patient Primary Diagnosis: Hoarseness     Patient's Social Hx:  Social History     Social History Narrative    Not on file        Patient's Medical Hx:  Past Medical History:   Diagnosis Date    Aortic stenosis, severe     Arthritis     Atypical migraine 9/24/2019    Excessive daytime sleepiness     Hyperlipidemia     Hypertension     Measles     Migraine     Mitral valve insufficiency     Mild to Moderate--S/p MVR    Paroxysmal atrial fibrillation 11/4/2016    Thyroid disease     Valvular heart disease 6/19/2019    AV replacement  Tissue 2/5/2013       PATIENT MEDICAL HISTORY:     Brooke Molina is a 83 y.o. female who presents to [facility] this date for a Voice Evaluation and Video Stroboscopy Evaluation by ENT  Dr. Mauricio Mcgill. Patient has a PMH of enlarged thyroid gland, thyroid mass L lobe, GERD, thyroidectomy, a carotid bruit in 2014 and underwent open heart surgery for an aortic valve replacement in 2013. A CT of the sinus 8/30/23 showed mild posterior R septal deviation. 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 Patient reports current voice problem has gotten worse over the last year. Pt has c/o hoarseness which worsens with use, vocal fatigue, and a burning sensation in her chest or throat which pt states is d/t dry reflux. She is currently taking medication for possible Sjogrens (for which she reports dry eyes and dry mouth), however only reports improvement with her eyes. She is to follow up with her physician regarding this. 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 Since retiring, pt's daily  "vocal use has decreased. She lives home alone and reported her that her  passed away in January of this year.     VOCAL HYGIENE:    The patient reports daily vocal usage for ADL is adequate. Daily liquid consumption is comprised of the following: coffee (2 large cups in the morning and occasionally at her afternoon and evening meals), water (16 ounces at night and 1-3 cups during the day), tea (decaffeinated, approximately 2 cups), and orange juice w/ Benefiber in the evening. Pt states she drinks one glass of wine approximately 1x/week. Patient states she bought and uses a humidifier every night when she goes to bed. The patient endorses some of the below listed which may contribute to possible vocally abusive behaviors/instances:     Throat clearing, excessive caffiene intake,  daily exercise, and intermittent whispering when hearing aids aren't in use, history of \"yelling\" at her .    The patient reports taking the following medications:     Fluticasone Propionate (nasal spray), Levocetirizine, Pantoprazole       The following medications and/or drug class were noted to have an impact on voice when cross referenced with the [facility] for Voice and Speech medication database:    Nasal Spray    Pharyngitis has been reported frequently during duloxetine administration (9% vs. 5% placebo). In clinical trials, cough was reported in 3--6% of those on duloxetine versus 3% of those receiving placebo.        Dry mouth may occur. The use of sedatives may produce an uninhibited or diminished drive to speak. Symptoms of dysarthria (slow, slurred and uncoordinated speech movements) may also be linked to sedative use.    SSRI's may have a drying effect on mucous membranes that may cause hoarseness, sore throat, voice changes or laryngitis. In addition to irritation, dry vocal folds may be more proven to injuries, such as nodules.    Anticonvulsants may produce an uninhibited or diminished drive to speak. " Symptoms of dysarthria (slow, slurred and uncoordinated speech movements) may also be linked to their use.    Narcotics may produce an uninhibited or diminished drive to speak. Symptoms of dysarthria (slow, slurred and uncoordinated speech movements) may also be linked to narcotic use.    Calcium channel blockers such as amolodipine should be used cautiously in patients with gastroesophageal reflux disease (GERD) or hiatal hernia associated with reflux esophagitis. The drugs relax the lower esophageal sphincter. Note that voice production can be adversely affected by GERD. Excessive coughing has been associated with the use of ACE inhibitors, which in turn, may lead to hoarseness and possibly vocal tissue damage.          VOICE HANDICAP INDEX AND REFLUX SYMPTOM INDEX:    REFLUX SYMPTOM INDEX:     Pt completed the Reflux Symptom Index (RSI). RSI is self rated on a scale of 0-5 (0= no problem and 5= severe problem)    Results as follows:      Hoarseness of a problem with your voice: 4  Clearing your throat: 1  Excess throat mucus or postnasal drip 0  Difficulty swallowing food, liquids, or pills 0  Coughing after you are or after lying down 0  Breathing difficulties or choking episodes 0  Troublesome or annoying cough 3  Sensations of something sticking in your throat 1  Heartburn, chest pain, indigestion, or stomach acid coming up 3      Total: 12    Normative data suggests that an RSI total of greater than or equal to 13 is clinically significant and may indicate uncontrolled reflux disease. (Normative date suggests that a RSI of greater than or equal to 13 is clinically significant. Therefore a RSI > 13 may be indicative of significant reflux disease.)    The Voice Handicap Index is a 30-item questionnaire introduced by Garcia et al. as an instrument to quantify the psychosocial consequences of a voice disorder, either in terms of voice outcome or voice-related quality of life.    The Voice Handicap Index was  completed by the patient with the following subsection scores:   Functional Impairment - 0   Physical - 28  Emotional - 13    This would equate to a composite score of 41 which would indicated a moderate impact/vocal impairment.    Severity Common Association:  0-30 Mild Minimal amount of handicap   31-60 Moderate Often seen in patients with vocal nodules, polyps, or cysts    Severe Often seen in patients with vocal fold paralysis or severe vocal fold scarring.       EXAMINATION NOTES:    The patient was positioned up at a 90 degree angle. The rigid scope was introduced into the oral cavity. The vocal folds were able to be visualized upon phonation of vowel /i/. Glottic closure was complete and adequate. Vocal fold edges were smooth with no areas of irregularity or irritation present. The vocal folds were pearly/whitish in appearance. Vertical length of approximation was equal bilaterally, with equal symmetry. Mild hyperfunction was noted intermittently however good relaxation noted as activities/tasks progressed. Arytenoid movement was observed to be equal and symmetric bilaterally. Vibratory behavior of the vocal folds was functional, equal and appropriate.       Phonation.      Inspiration    Ventricular compression, irregular area in L pyriform sinus    ACOUSTIC MEASURES:    Additional acoustic and auditory parameters of voice were measured using the [employer] IV. Fundamental Frequency for various speaking tasks ranged from 166.79 to 180.58 Htz (norm for an adult female is 175-220). Volume was low for this one on one environment measured at between 53 and 64.10 dB. Pitch range was measured at 91 to 860.02 Htz. Maximum phonation time was 7.09 seconds and with cued pitch raise increased to 15.99 seconds. S/Z ratio was measured at 0.68.  Research shows that an s:z ratio in excessive of 1.4 is correlated with pathological laryngeal function in relation to dysphonia and voice disorders. The patient used a  "thoracic pattern of respiration during this session which was judged to reduced for speech production purposes.       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 Teaching, counseling and written information was provided with extensive education regarding care/use of voice.  She was provided a list of \"Do's and Don'ts for Vocal Hoarseness\" and this was reviewed in detail with her. The patient expressed understanding and was in agreement with all of the information provided. She would benefit from a skilled voice therapy to aid in remediation of the above mentioned deficits.     Thank you for referring this pleasant patient to our department and allowing us to participate in her care.       LONG TERM GOAL:    The patient will return voice to useful function for all daily needs, with independent use of compensations, as measured by by improved scores on the Voice Handicap Index and via acoustic and perceptual evaluations.       SHORT TERM GOALS:     The patient will participate in the establishment and carryover of a home program to address reduction of phonotraumatic behaviors, be able to identify and eliminate these behaviors and develop and implement a vocal hygiene program.    2.    The patient will increase pitch for phrase/sentences, and reduce glottal ramirez, during therapy exercises to be 175 Hz or above, to a more optimal level    3.    The patient will increase water intake daily to 5 or more cups to increase vocal hygiene.     4.     The patient will use diaphragmatic breathing for speech tasks during therapy with 90% accuracy to increase ease of onset, decrease hard attacks and increase pitch    5.      The patient will increase maximum phonation time using raised pitch to 17 seconds.    6.      Additional " goals/recommendations as indicated.

## 2023-11-27 RX ORDER — METOPROLOL TARTRATE 50 MG/1
50 TABLET, FILM COATED ORAL 2 TIMES DAILY
Qty: 180 TABLET | Refills: 3 | Status: SHIPPED | OUTPATIENT
Start: 2023-11-27

## 2023-12-18 ENCOUNTER — HOSPITAL ENCOUNTER (OUTPATIENT)
Dept: SPEECH THERAPY | Facility: HOSPITAL | Age: 83
Discharge: HOME | End: 2023-12-18
Admitting: OTOLARYNGOLOGY
Payer: MEDICARE

## 2023-12-18 PROCEDURE — 92526 ORAL FUNCTION THERAPY: CPT

## 2023-12-18 NOTE — THERAPY TREATMENT NOTE
Outpatient Speech Language Pathology   Adult/Peds Voice Treatment Note  AUGUSTUS Whittaker     Patient Name: Brooke Molina  : 1940  MRN: [id number]  Today's Date: 2023           Visit Date: 2023      Patient Active Problem List   Diagnosis    Migraine with aura and without status migrainosus, not intractable    Transient cerebral ischemia    DDD (degenerative disc disease), lumbosacral    DJD (degenerative joint disease) of knee    GERD (gastroesophageal reflux disease)    Situational depression    Aortic insufficiency    Carotid bruit    Essential hypertension    Paroxysmal atrial fibrillation    Valvular heart disease    Vitamin D deficiency    Osteoarthritis    Gastroesophageal reflux disease    Mixed hyperlipidemia    Migraine headache    Temporary cerebral vascular dysfunction    Headache    Atypical migraine    Palpitations    Obesity (BMI 30-39.9)    Other chest pain    Paresthesia    Osteoarthrosis, localized, primary, involving shoulder region    Spinal stenosis of cervicothoracic region    PAD (peripheral artery disease)    Elevated lipoprotein(a)    Weakness of both legs    Infected insect bite of left arm    Lumbar radiculopathy    Thyroid nodule    Chronic fatigue    Vitamin D deficiency, unspecified    Poisoning by vitamin D    Sjogren's syndrome with keratoconjunctivitis sicca       Visit Dx:  Hoarseness      The patient was seen today for skilled therapy targeting voice. She was prompt, alert and very motivated for session today. Therapy today focused on the following goals:      LONG TERM GOAL:     The patient will return voice to useful function for all daily needs, with independent use of compensations, as measured by by improved scores on the Voice Handicap Index and via acoustic and perceptual evaluations.         SHORT TERM GOALS:     The patient will participate in the establishment and carryover of a home program to address reduction of phonotraumatic behaviors, be able to  "identify and eliminate these behaviors and develop and implement a vocal hygiene program.     12/18/2023: Reviewed Do's and Don't for Vocal Hoarseness and increasing liquid/water intake. The patient states that she only drinks one (large) cup of coffee in the mornings now, and has increased water intake slightly. She continues to report concerns for possible Sjogren's and complains of dry mouth, particularly when talking/speaking much. Discussed need and rationale for proper hydration for the laryngeal musculature and impacts on vocal hoarseness. She verbalized understanding and was in agreement with the plan.      2.    The patient will increase pitch for phrase/sentences, and reduce glottal ramirez, during therapy exercises to be 175 Hz or above, to a more optimal level     At the initiation of this session the patient's fundamental frequency for a sustained vowel was 162 Htz. Initiated easy onset words using /h/1 words, addressing breath support, decreasing glottal ramirez and increased pitch. For single syllable /h/1 words she was able to achieve a FF of 194 Htz. Initiated /h/1 two syllable words and the patient was able to produce these using the above techniques with an average of 257 Htz. Initiated multi-syllabic words with an average fundamental frequency 226 Htz. The patient initially stated she had to \"use a lot of effort\" however as the session progressed she verbalized the use of less effort and was becoming self aware of her progress. The VisiPitch IV was utilized during these tasks today.      3.    The patient will increase water intake daily to 5 or more cups to increase vocal hygiene.      12/18/2023. See above. The patient indicates increased water intake and less caffeine intake     4.     The patient will use diaphragmatic breathing for speech tasks during therapy with 90% accuracy to increase ease of onset, decrease hard attacks and increase pitch     12/18/2023: The patient was able to use " "diaphragmatic breathing during VIsiPitch Tasks today and demonstrated increased utterance length. She is to practice this at home.      5.      The patient will increase maximum phonation time using raised pitch to 17 seconds.     12/28/2023: Goal not formally addressed this date     6.      Additional goals/recommendations as indicated.      12/28/2023: The patient is beginning to become more self aware of instance of glottal ramirez and hard glottal attacks. She was able to ID with use of the Visipitch some of the pitch breaks, drops in pitch and hoarseness. She verbalized understanding and was in agreement with the plan. She was given a list of /h/1 sentences and is to practice saying /h/1 one syllable words, 2 syllable words and multi-syllable words. She was also encouraged to use \"non-/h/\" words, such as counting from 1-10, producing the days of the week and the months of the year, using easy onset, diaphragmatic breathing and raised pitch. She was given \"homework\" and is to practice this at home. She is going to be out of town for a week for the Holidays, thus her next scheduled follow up appointment will be Monday, January 8, 2024 at 2:00 pm. Patient in agreement with all of the above.         Time Calculation:          Lynne Doll, SLP  12/18/2023  "

## 2024-01-22 ENCOUNTER — HOSPITAL ENCOUNTER (OUTPATIENT)
Dept: SPEECH THERAPY | Facility: HOSPITAL | Age: 84
Discharge: HOME | End: 2024-01-22
Admitting: OTOLARYNGOLOGY
Payer: MEDICARE

## 2024-01-22 PROCEDURE — 92507 TX SP LANG VOICE COMM INDIV: CPT

## 2024-01-22 NOTE — THERAPY TREATMENT NOTE
Outpatient Speech Language Pathology   Adult/Peds Voice Treatment Note  [facility]UST[facility] Whittaker     Patient Name: Brooke Molina  : 1940  MRN: [id number]  Today's Date: 2024           Visit Date: 2024      Patient Active Problem List   Diagnosis    Migraine with aura and without status migrainosus, not intractable    Transient cerebral ischemia    DDD (degenerative disc disease), lumbosacral    DJD (degenerative joint disease) of knee    GERD (gastroesophageal reflux disease)    Situational depression    Aortic insufficiency    Carotid bruit    Essential hypertension    Paroxysmal atrial fibrillation    Valvular heart disease    Vitamin D deficiency    Osteoarthritis    Gastroesophageal reflux disease    Mixed hyperlipidemia    Migraine headache    Temporary cerebral vascular dysfunction    Headache    Atypical migraine    Palpitations    Obesity (BMI 30-39.9)    Other chest pain    Paresthesia    Osteoarthrosis, localized, primary, involving shoulder region    Spinal stenosis of cervicothoracic region    PAD (peripheral artery disease)    Elevated lipoprotein(a)    Weakness of both legs    Infected insect bite of left arm    Lumbar radiculopathy    Thyroid nodule    Chronic fatigue    Vitamin D deficiency, unspecified    Poisoning by vitamin D    Sjogren's syndrome with keratoconjunctivitis sicca     Visit Dx:  Hoarseness        The patient was seen today for continued skilled therapy targeting voice. She was prompt, alert and very motivated for session today. Therapy today focused on the following goals:        LONG TERM GOAL:      The patient will return voice to useful function for all daily needs, with independent use of compensations, as measured by improved scores on the Voice Handicap Index and via acoustic and perceptual evaluations.         SHORT TERM GOALS:     The patient will participate in the establishment and carryover of a home program to address reduction of phonotraumatic behaviors, be  "able to identify and eliminate these behaviors and develop and implement a vocal hygiene program.                 1/22/2024: The patient continues to drink water and avoid vocally abusive behaviors. She did report that over the holidays she went to [place] and was ill for approximately 1 week. She reported much coughing associated with this illness. She did report improvements, however over the past few days (2-3) she reports an increased \"dry cough\" and as a result, reports increased hoarseness. Discussed continuing to stay well hydrated during these periods, as well as the effects of frequent coughing/throat clearing on voice and vocal cords.      2.    The patient will increase pitch for phrase/sentences, and reduce glottal ramirez, during therapy exercises to be 175 Hz or above, to a more optimal level                 1/22/2024: The patient was able to produce phrases with an average FF of approximately 175 today as measured by the Visipitch IV. Continued to address easy onset, decreasing hard glottal attacks and identifying and decreasing pitch breaks. Worked on identification of \"smooth\" versus \"bumpy\" speech.      3.    The patient will increase water intake daily to 5 or more cups to increase vocal hygiene.                   1/22/2024. See above. The patient indicates increased water intake and less caffeine intake. She does report frequent dry mouth, particularly related to her possible diagnosis of Sjogren's Syndrome. She states she was recently seen by her dentist and was told she has several cavities, which she feels is related to the dry mouth.      4.     The patient will use diaphragmatic breathing for speech tasks during therapy with 90% accuracy to increase ease of onset, decrease hard attacks and increase pitch                 1/22/2024: The patient continues to work on diaphragmatic breathing tasks at home     5.      The patient will increase maximum phonation time using raised pitch to 17 seconds.   " "              1/22/2024: Goal not formally addressed this date     6.      Additional goals/recommendations as indicated.                  1/22/2024: The patient continue to be more self aware of instance of glottal ramirez and hard glottal attacks. She was given gliding and frontal focus exercises today, with extensive review provided. All tasks were demonstrated by clinician and performed by the patient. Rational was provided with the patient verbalizing understanding. She was encouraged to continue to practice activities provided during last session as well as the Gliding and Frontal  Focus tasks provided today. Subjectively the patient reports an improvement in her voice and quality. She rates her voice, on a 1-10 scale (with 1 being it's worst and 10 being at it's best) as a 7-8, compared to before therapy she rated it at a \"3-4\", stating she \"didn't want to hear herself talk at all\" then. She indicated she would rate her voice even higher if she had not been coughing so much recently. Discussed effects of coughing/throat clearing on her voice and she expressed understanding.         The patient is to practice information provided to her today and during last session. Next session tentatively scheduled for Monday, Feb 19, 2024 at 2:00 pm.       Time Calculation:          Lynne Doll, SLP  1/22/2024  "

## 2024-01-30 ENCOUNTER — TRANSCRIBE ORDERS (OUTPATIENT)
Dept: ADMINISTRATIVE | Facility: HOSPITAL | Age: 84
End: 2024-01-30
Payer: MEDICARE

## 2024-01-30 DIAGNOSIS — R26.89 OTHER ABNORMALITIES OF GAIT AND MOBILITY: ICD-10-CM

## 2024-01-30 DIAGNOSIS — G45.9 TRANSIENT CEREBRAL ISCHEMIA, UNSPECIFIED TYPE: Primary | ICD-10-CM

## 2024-02-13 ENCOUNTER — OFFICE (AMBULATORY)
Dept: URBAN - METROPOLITAN AREA CLINIC 64 | Facility: CLINIC | Age: 84
End: 2024-02-13

## 2024-02-13 VITALS
SYSTOLIC BLOOD PRESSURE: 118 MMHG | WEIGHT: 173 LBS | HEIGHT: 66 IN | HEART RATE: 69 BPM | DIASTOLIC BLOOD PRESSURE: 68 MMHG

## 2024-02-13 DIAGNOSIS — R19.4 CHANGE IN BOWEL HABIT: ICD-10-CM

## 2024-02-13 DIAGNOSIS — R15.9 FULL INCONTINENCE OF FECES: ICD-10-CM

## 2024-02-13 PROCEDURE — 99212 OFFICE O/P EST SF 10 MIN: CPT | Performed by: NURSE PRACTITIONER

## 2024-03-04 ENCOUNTER — HOSPITAL ENCOUNTER (OUTPATIENT)
Dept: SPEECH THERAPY | Facility: HOSPITAL | Age: 84
Discharge: HOME | End: 2024-03-04
Admitting: OTOLARYNGOLOGY
Payer: MEDICARE

## 2024-03-04 PROCEDURE — 92507 TX SP LANG VOICE COMM INDIV: CPT

## 2024-03-04 NOTE — THERAPY DISCHARGE NOTE
Outpatient Speech Language Pathology   Adult/Peds Voice Eval/Discharge  [facility]     Patient Name: Brooke Molina  : 1940  MRN: [id number]  Today's Date: 3/4/2024        Visit Date: 2024   Patient Active Problem List   Diagnosis    Migraine with aura and without status migrainosus, not intractable    Transient cerebral ischemia    DDD (degenerative disc disease), lumbosacral    DJD (degenerative joint disease) of knee    GERD (gastroesophageal reflux disease)    Situational depression    Aortic insufficiency    Carotid bruit    Essential hypertension    Paroxysmal atrial fibrillation    Valvular heart disease    Vitamin D deficiency    Osteoarthritis    Gastroesophageal reflux disease    Mixed hyperlipidemia    Migraine headache    Temporary cerebral vascular dysfunction    Headache    Atypical migraine    Palpitations    Obesity (BMI 30-39.9)    Other chest pain    Paresthesia    Osteoarthrosis, localized, primary, involving shoulder region    Spinal stenosis of cervicothoracic region    PAD (peripheral artery disease)    Elevated lipoprotein(a)    Weakness of both legs    Infected insect bite of left arm    Lumbar radiculopathy    Thyroid nodule    Chronic fatigue    Vitamin D deficiency, unspecified    Poisoning by vitamin D    Sjogren's syndrome with keratoconjunctivitis sicca        Past Medical History:   Diagnosis Date    Aortic stenosis, severe     Arthritis     Atypical migraine 2019    Excessive daytime sleepiness     Hyperlipidemia     Hypertension     Measles     Migraine     Mitral valve insufficiency     Mild to Moderate--S/p MVR    Paroxysmal atrial fibrillation 2016    Thyroid disease     Valvular heart disease 2019    AV replacement  Tissue 2013        Past Surgical History:   Procedure Laterality Date    AORTIC VALVE REPAIR/REPLACEMENT  13-[facility] Paredes    APPENDECTOMY      BREAST BIOPSY      CARDIAC CATHETERIZATION  12-[facility]ittaker    HYSTERECTOMY  "     REDUCTION MAMMAPLASTY      STERNOTOMY  02/5/13- Panfil[facility] Paredes    THYROID SURGERY         Visit Dx:  Hoarseness        The patient was seen today for continued skilled therapy targeting voice. She was prompt, alert and very motivated for session today. Therapy today focused on the following goals:        LONG TERM GOAL:      The patient will return voice to useful function for all daily needs, with independent use of compensations, as measured by improved scores on the Voice Handicap Index and via acoustic and perceptual evaluations.     3/4/2024: The patient has demonstrated functional improvements with her voice and is independent with voice exercises and techniques. She subjectively describes her voice improving to an 8 or 8.5 some days (on a 1-10 scale) with \"10\" being at it's best, however does report some days are \"worse than others\" particularly days when she uses her voice quite a bit. She continues to practice vocal function exercises at home and continues to practice a healthy vocal hygiene program.    Semi-Occluded Vocal Tract Exercises initiated today with straw phonation with and without water resistance. She was able to perform this with constant air flow, voicing, ascending glides and descending glides. Education provided regarding use of exercises to facilitate easy, efficient vocal fold vibration and to reduce laryngeal hyperfunction. The patient was able to perform these exercises and was given additional information to practice at home.    Much teaching and education completed regarding continuing to perform exercised provided today and during last session, need to \"warm up and warm down\" voice, vocally demanding environments and ways to address these situation. She verbalized understanding and was in agreement with the plan. She is to continue with the above plan and is in agreement to discharge from skilled voice therapy at this time.    Thank you for referring this pleasant lady to " our department. We are available to follow up with a repeat evaluation (including a repeat video stroboscopy) in the future if indicated.         SHORT TERM GOALS:     The patient will participate in the establishment and carryover of a home program to address reduction of phonotraumatic behaviors, be able to identify and eliminate these behaviors and develop and implement a vocal hygiene program.            Goal met. See above          2.    The patient will increase pitch for phrase/sentences, and reduce glottal ramirez, during therapy exercises to be 175 Hz or above, to a more optimal level                 Goal met. See above     3.    The patient will increase water intake daily to 5 or more cups to increase vocal hygiene.                   Goal met, see above      4.     The patient will use diaphragmatic breathing for speech tasks during therapy with 90% accuracy to increase ease of onset, decrease hard attacks and increase pitch                 Goal met, see above     5.      The patient will increase maximum phonation time using raised pitch to 17 seconds.                 Goal not met, see above.      6.      Additional goals/recommendations as indicated.             Time Calculation:            Lynne Doll, SLP  3/4/2024

## 2024-05-20 RX ORDER — SERTRALINE HYDROCHLORIDE 100 MG/1
TABLET, FILM COATED ORAL
Qty: 90 TABLET | OUTPATIENT
Start: 2024-05-20

## 2024-06-29 ENCOUNTER — HOSPITAL ENCOUNTER (EMERGENCY)
Facility: HOSPITAL | Age: 84
Discharge: HOME | End: 2024-06-29
Attending: EMERGENCY MEDICINE
Payer: MEDICARE

## 2024-06-29 ENCOUNTER — APPOINTMENT (OUTPATIENT)
Dept: CT IMAGING | Facility: HOSPITAL | Age: 84
End: 2024-06-29
Payer: MEDICARE

## 2024-06-29 VITALS
SYSTOLIC BLOOD PRESSURE: 138 MMHG | HEIGHT: 66 IN | HEART RATE: 70 BPM | TEMPERATURE: 97.5 F | RESPIRATION RATE: 16 BRPM | WEIGHT: 178.79 LBS | OXYGEN SATURATION: 99 % | DIASTOLIC BLOOD PRESSURE: 69 MMHG | BODY MASS INDEX: 28.73 KG/M2

## 2024-06-29 DIAGNOSIS — R10.84 GENERALIZED ABDOMINAL PAIN: ICD-10-CM

## 2024-06-29 DIAGNOSIS — K43.9 VENTRAL HERNIA WITHOUT OBSTRUCTION OR GANGRENE: Primary | ICD-10-CM

## 2024-06-29 LAB
ALBUMIN SERPL-MCNC: 4.2 G/DL (ref 3.5–5.2)
ALBUMIN/GLOB SERPL: 1.8 G/DL
ALP SERPL-CCNC: 56 U/L (ref 39–117)
ALT SERPL W P-5'-P-CCNC: 23 U/L (ref 1–33)
ANION GAP SERPL CALCULATED.3IONS-SCNC: 7.8 MMOL/L (ref 5–15)
AST SERPL-CCNC: 26 U/L (ref 1–32)
BACTERIA UR QL AUTO: NORMAL /HPF
BASOPHILS # BLD AUTO: 0.05 10*3/MM3 (ref 0–0.2)
BASOPHILS NFR BLD AUTO: 1 % (ref 0–1.5)
BILIRUB SERPL-MCNC: 0.3 MG/DL (ref 0–1.2)
BILIRUB UR QL STRIP: NEGATIVE
BUN SERPL-MCNC: 20 MG/DL (ref 8–23)
BUN/CREAT SERPL: 27.4 (ref 7–25)
CALCIUM SPEC-SCNC: 9.6 MG/DL (ref 8.6–10.5)
CHLORIDE SERPL-SCNC: 109 MMOL/L (ref 98–107)
CLARITY UR: CLEAR
CO2 SERPL-SCNC: 26.2 MMOL/L (ref 22–29)
COLOR UR: YELLOW
CREAT SERPL-MCNC: 0.73 MG/DL (ref 0.57–1)
DEPRECATED RDW RBC AUTO: 45.6 FL (ref 37–54)
EGFRCR SERPLBLD CKD-EPI 2021: 82.7 ML/MIN/1.73
EOSINOPHIL # BLD AUTO: 0.16 10*3/MM3 (ref 0–0.4)
EOSINOPHIL NFR BLD AUTO: 3.2 % (ref 0.3–6.2)
ERYTHROCYTE [DISTWIDTH] IN BLOOD BY AUTOMATED COUNT: 12.7 % (ref 12.3–15.4)
GLOBULIN UR ELPH-MCNC: 2.4 GM/DL
GLUCOSE SERPL-MCNC: 100 MG/DL (ref 65–99)
GLUCOSE UR STRIP-MCNC: NEGATIVE MG/DL
HCT VFR BLD AUTO: 39.8 % (ref 34–46.6)
HGB BLD-MCNC: 13 G/DL (ref 12–15.9)
HGB UR QL STRIP.AUTO: NEGATIVE
HOLD SPECIMEN: NORMAL
HOLD SPECIMEN: NORMAL
HYALINE CASTS UR QL AUTO: NORMAL /LPF
IMM GRANULOCYTES # BLD AUTO: 0.01 10*3/MM3 (ref 0–0.05)
IMM GRANULOCYTES NFR BLD AUTO: 0.2 % (ref 0–0.5)
KETONES UR QL STRIP: NEGATIVE
LEUKOCYTE ESTERASE UR QL STRIP.AUTO: ABNORMAL
LIPASE SERPL-CCNC: 25 U/L (ref 13–60)
LYMPHOCYTES # BLD AUTO: 1.59 10*3/MM3 (ref 0.7–3.1)
LYMPHOCYTES NFR BLD AUTO: 32.1 % (ref 19.6–45.3)
MCH RBC QN AUTO: 31.7 PG (ref 26.6–33)
MCHC RBC AUTO-ENTMCNC: 32.7 G/DL (ref 31.5–35.7)
MCV RBC AUTO: 97.1 FL (ref 79–97)
MONOCYTES # BLD AUTO: 0.44 10*3/MM3 (ref 0.1–0.9)
MONOCYTES NFR BLD AUTO: 8.9 % (ref 5–12)
NEUTROPHILS NFR BLD AUTO: 2.71 10*3/MM3 (ref 1.7–7)
NEUTROPHILS NFR BLD AUTO: 54.6 % (ref 42.7–76)
NITRITE UR QL STRIP: NEGATIVE
NRBC BLD AUTO-RTO: 0 /100 WBC (ref 0–0.2)
PH UR STRIP.AUTO: 6 [PH] (ref 5–8)
PLATELET # BLD AUTO: 155 10*3/MM3 (ref 140–450)
PMV BLD AUTO: 9.9 FL (ref 6–12)
POTASSIUM SERPL-SCNC: 4 MMOL/L (ref 3.5–5.2)
PROT SERPL-MCNC: 6.6 G/DL (ref 6–8.5)
PROT UR QL STRIP: NEGATIVE
RBC # BLD AUTO: 4.1 10*6/MM3 (ref 3.77–5.28)
RBC # UR STRIP: NORMAL /HPF
REF LAB TEST METHOD: NORMAL
SODIUM SERPL-SCNC: 143 MMOL/L (ref 136–145)
SP GR UR STRIP: 1.01 (ref 1–1.03)
SQUAMOUS #/AREA URNS HPF: NORMAL /HPF
UROBILINOGEN UR QL STRIP: ABNORMAL
WBC # UR STRIP: NORMAL /HPF
WBC NRBC COR # BLD AUTO: 4.96 10*3/MM3 (ref 3.4–10.8)
WHOLE BLOOD HOLD COAG: NORMAL
WHOLE BLOOD HOLD SPECIMEN: NORMAL

## 2024-06-29 PROCEDURE — 81001 URINALYSIS AUTO W/SCOPE: CPT | Performed by: EMERGENCY MEDICINE

## 2024-06-29 PROCEDURE — 99284 EMERGENCY DEPT VISIT MOD MDM: CPT

## 2024-06-29 PROCEDURE — 80053 COMPREHEN METABOLIC PANEL: CPT | Performed by: EMERGENCY MEDICINE

## 2024-06-29 PROCEDURE — 85025 COMPLETE CBC W/AUTO DIFF WBC: CPT | Performed by: EMERGENCY MEDICINE

## 2024-06-29 PROCEDURE — 83690 ASSAY OF LIPASE: CPT | Performed by: EMERGENCY MEDICINE

## 2024-06-29 PROCEDURE — 74176 CT ABD & PELVIS W/O CONTRAST: CPT

## 2024-06-29 RX ORDER — SODIUM CHLORIDE 0.9 % (FLUSH) 0.9 %
10 SYRINGE (ML) INJECTION AS NEEDED
Status: DISCONTINUED | OUTPATIENT
Start: 2024-06-29 | End: 2024-06-29 | Stop reason: HOSPADM

## 2024-06-29 NOTE — ED PROVIDER NOTES
Subjective   History of Present Illness  83-year-old female describes some pain above the umbilicus over the last couple of days worse with movement and feels a knot in that area.  She denies vomiting or diarrhea or trauma and reports no fevers or chills or urinary symptoms.  Review of Systems    Past Medical History:   Diagnosis Date    Aortic stenosis, severe     Arthritis     Atypical migraine 9/24/2019    Excessive daytime sleepiness     Hyperlipidemia     Hypertension     Measles     Migraine     Mitral valve insufficiency     Mild to Moderate--S/p MVR    Paroxysmal atrial fibrillation 11/4/2016    Thyroid disease     Valvular heart disease 6/19/2019    AV replacement  Tissue 2/5/2013       Allergies   Allergen Reactions    Codeine GI Intolerance       Past Surgical History:   Procedure Laterality Date    AORTIC VALVE REPAIR/REPLACEMENT  2/5/13- [facility] Paredes    APPENDECTOMY      BREAST BIOPSY      CARDIAC CATHETERIZATION  08/24/12- Panfilo    HYSTERECTOMY      REDUCTION MAMMAPLASTY      STERNOTOMY  02/5/13- Panfil[facility] Paredes    THYROID SURGERY         Family History   Problem Relation Age of Onset    Dementia Mother     Heart disease Mother     Heart disease Father     Cancer Sister     Multiple sclerosis Sister     Breast cancer Sister     Breast cancer Daughter     Migraines Daughter     Dementia Maternal Grandmother     Heart disease Paternal Grandmother     Heart disease Maternal Grandfather        Social History     Socioeconomic History    Marital status:     Number of children: 2   Tobacco Use    Smoking status: Never    Smokeless tobacco: Never   Vaping Use    Vaping status: Never Used   Substance and Sexual Activity    Alcohol use: Yes     Comment: Social    Drug use: No    Sexual activity: Defer     Birth control/protection: Surgical     Comment: Hyst     Prior to Admission medications    Medication Sig Start Date End Date Taking? Authorizing Provider   amitriptyline (ELAVIL) 25 MG  tablet TAKE ONE TABLET BY MOUTH ONCE NIGHTLY 10/16/23   Ilda Post APRN   aspirin 81 MG chewable tablet Chew 1 tablet Daily.    Katarina Zamorano MD   betamethasone dipropionate (DIPROLENE) 0.05 % lotion Apply  topically to the appropriate area as directed 2 (Two) Times a Day. 2-3 times daily PRN 5/19/22   Miriam Benitez APRN   hydroCHLOROthiazide (HYDRODIURIL) 25 MG tablet 1 tablet Daily. 8/11/23   Katarina Zamorano MD   hydroxychloroquine (PLAQUENIL) 200 MG tablet Take  by mouth Daily.  Patient not taking: Reported on 10/24/2023    Katarina Zamorano MD   levocetirizine (XYZAL) 5 MG tablet  8/21/23   Katarina Zamorano MD   metoprolol tartrate (LOPRESSOR) 50 MG tablet TAKE ONE TABLET BY MOUTH TWICE A DAY 11/27/23   Ilda Goldman APRN   multivitamin (THERAGRAN) tablet tablet Take 1 tablet by mouth Daily.    Katarina Zamorano MD   nystatin (MYCOSTATIN) 100,000 unit/mL suspension Swish and swallow 5 mL 4 (Four) Times a Day.  Patient taking differently: Swish and swallow 5 mL As Needed. 6/21/23   Seema Connell MD   Omega-3 Fatty Acids (FISH OIL) 1000 MG capsule capsule Take 1 capsule by mouth Daily With Breakfast.    Katarina Zamorano MD   ondansetron ODT (Zofran ODT) 4 MG disintegrating tablet Place 1 tablet on the tongue Every 8 (Eight) Hours As Needed for Nausea or Vomiting. 2/15/22   Seema Connell MD   pantoprazole (PROTONIX) 40 MG EC tablet Take 1 tablet by mouth As Needed.    Katarina Zamorano MD   Psyllium (Metamucil) wafer wafer Take  by mouth Daily.    Katarina Zamorano MD   Repatha solution prefilled syringe injection INJECT 1 ML UNDER THE SKIN EVERY 14 DAYS AS DIRECTED 6/12/23   Seema Connell MD   sertraline (ZOLOFT) 100 MG tablet TAKE ONE TABLET BY MOUTH DAILY 5/17/23   Seema Connell MD   topiramate (TOPAMAX) 100 MG tablet TAKE ONE TABLET BY MOUTH DAILY 6/16/23   Seema Connell MD   vitamin B-12 (CYANOCOBALAMIN) 500 MCG  "tablet Take 1 tablet by mouth Daily.    ProviderKatarina MD   vitamin C (ASCORBIC ACID) 250 MG tablet Take 1 tablet by mouth Daily.    Katarina Zamorano MD   Zinc Sulfate (ZINC 15 PO) Take  by mouth.    Katarina Zamorano MD     /75   Pulse 73   Temp 97.2 °F (36.2 °C) (Oral)   Resp 16   Ht 166.4 cm (65.5\")   Wt 81.1 kg (178 lb 12.7 oz)   SpO2 100%   BMI 29.30 kg/m²         Objective   Physical Exam  General: Well-developed well-appearing, no acute distress, alert and appropriate  Eyes:  sclera nonicteric  HEENT: Mucous membranes moist, no mucosal swelling  Neck: Supple, no nuchal rigidity,   Respirations: Respirations nonlabored, equal breath sounds bilaterally, clear lungs  Heart regular rate and rhythm,    Abdomen soft, mildly tender palpation in the upper abdomen just above the umbilicus where there is a palpable small ventral protruding hernia that was  reducible.  Nondistended, no hepatosplenomegaly, no hernia, no mass, normal bowel sounds, no CVA tenderness  Extremities no clubbing cyanosis or edema,  Neuro cranial nerves grossly intact, no focal limb deficits  Psych oriented, pleasant affect  Skin no rash, brisk cap refill  Procedures           ED Course      Results for orders placed or performed during the hospital encounter of 06/29/24   Comprehensive Metabolic Panel    Specimen: Blood   Result Value Ref Range    Glucose 100 (H) 65 - 99 mg/dL    BUN 20 8 - 23 mg/dL    Creatinine 0.73 0.57 - 1.00 mg/dL    Sodium 143 136 - 145 mmol/L    Potassium 4.0 3.5 - 5.2 mmol/L    Chloride 109 (H) 98 - 107 mmol/L    CO2 26.2 22.0 - 29.0 mmol/L    Calcium 9.6 8.6 - 10.5 mg/dL    Total Protein 6.6 6.0 - 8.5 g/dL    Albumin 4.2 3.5 - 5.2 g/dL    ALT (SGPT) 23 1 - 33 U/L    AST (SGOT) 26 1 - 32 U/L    Alkaline Phosphatase 56 39 - 117 U/L    Total Bilirubin 0.3 0.0 - 1.2 mg/dL    Globulin 2.4 gm/dL    A/G Ratio 1.8 g/dL    BUN/Creatinine Ratio 27.4 (H) 7.0 - 25.0    Anion Gap 7.8 5.0 - 15.0 mmol/L "    eGFR 82.7 >60.0 mL/min/1.73   Lipase    Specimen: Blood   Result Value Ref Range    Lipase 25 13 - 60 U/L   Urinalysis With Culture If Indicated - Urine, Clean Catch    Specimen: Urine, Clean Catch   Result Value Ref Range    Color, UA Yellow Yellow, Straw    Appearance, UA Clear Clear    pH, UA 6.0 5.0 - 8.0    Specific Gravity, UA 1.009 1.005 - 1.030    Glucose, UA Negative Negative    Ketones, UA Negative Negative    Bilirubin, UA Negative Negative    Blood, UA Negative Negative    Protein, UA Negative Negative    Leuk Esterase, UA Trace (A) Negative    Nitrite, UA Negative Negative    Urobilinogen, UA 0.2 E.U./dL 0.2 - 1.0 E.U./dL   CBC Auto Differential    Specimen: Blood   Result Value Ref Range    WBC 4.96 3.40 - 10.80 10*3/mm3    RBC 4.10 3.77 - 5.28 10*6/mm3    Hemoglobin 13.0 12.0 - 15.9 g/dL    Hematocrit 39.8 34.0 - 46.6 %    MCV 97.1 (H) 79.0 - 97.0 fL    MCH 31.7 26.6 - 33.0 pg    MCHC 32.7 31.5 - 35.7 g/dL    RDW 12.7 12.3 - 15.4 %    RDW-SD 45.6 37.0 - 54.0 fl    MPV 9.9 6.0 - 12.0 fL    Platelets 155 140 - 450 10*3/mm3    Neutrophil % 54.6 42.7 - 76.0 %    Lymphocyte % 32.1 19.6 - 45.3 %    Monocyte % 8.9 5.0 - 12.0 %    Eosinophil % 3.2 0.3 - 6.2 %    Basophil % 1.0 0.0 - 1.5 %    Immature Grans % 0.2 0.0 - 0.5 %    Neutrophils, Absolute 2.71 1.70 - 7.00 10*3/mm3    Lymphocytes, Absolute 1.59 0.70 - 3.10 10*3/mm3    Monocytes, Absolute 0.44 0.10 - 0.90 10*3/mm3    Eosinophils, Absolute 0.16 0.00 - 0.40 10*3/mm3    Basophils, Absolute 0.05 0.00 - 0.20 10*3/mm3    Immature Grans, Absolute 0.01 0.00 - 0.05 10*3/mm3    nRBC 0.0 0.0 - 0.2 /100 WBC   Urinalysis, Microscopic Only - Urine, Clean Catch    Specimen: Urine, Clean Catch   Result Value Ref Range    RBC, UA 0-2 None Seen, 0-2 /HPF    WBC, UA 0-2 None Seen, 0-2 /HPF    Bacteria, UA None Seen None Seen /HPF    Squamous Epithelial Cells, UA 0-2 None Seen, 0-2 /HPF    Hyaline Casts, UA None Seen None Seen /LPF    Methodology Automated Microscopy     Green Top (Gel)   Result Value Ref Range    Extra Tube Hold for add-ons.    Lavender Top   Result Value Ref Range    Extra Tube hold for add-on    Gold Top - SST   Result Value Ref Range    Extra Tube Hold for add-ons.    Light Blue Top   Result Value Ref Range    Extra Tube Hold for add-ons.      CT Abdomen Pelvis Without Contrast    Result Date: 6/29/2024  No acute abdominal or pelvic pathology. Electronically Signed: Delvin Farrell MD  6/29/2024 12:50 PM EDT  Workstation ID: [id number]                                            Medical Decision Making  Differential diagnose including incarcerated hernia, perforation, bowel obstruction, ischemic bowel    Patient felt much better and symptoms had completely resolved shortly following the manage reduction of the ventral hernia.  On reexamination she is resting comfortably and asymptomatic.  She was advised of findings.  She is referred for outpatient surgical follow-up.  We discussed warning signs for return.    Problems Addressed:  Generalized abdominal pain: complicated acute illness or injury  Ventral hernia without obstruction or gangrene: complicated acute illness or injury    Amount and/or Complexity of Data Reviewed  Labs: ordered. Decision-making details documented in ED Course.     Details: Urinalysis negative for bacteria, CBC normal, lipase normal, comprehensive metabolic panel essentially normal  Radiology: ordered and independent interpretation performed.     Details: My independent interpretation of CT abdomen image no obstruction or free air    Risk  Prescription drug management.        Final diagnoses:   Ventral hernia without obstruction or gangrene   Generalized abdominal pain       ED Disposition  ED Disposition       ED Disposition   Discharge    Condition   Stable    Comment   --               Dreek Patrick MD  [address] [place] [id number]  [phone]    Schedule an appointment as soon as possible for a visit in 1  week           Medication List        Changed      nystatin 100,000 unit/mL suspension  Commonly known as: MYCOSTATIN  Swish and swallow 5 mL 4 (Four) Times a Day.  What changed:   when to take this  reasons to take this                 Lee Morgan MD  06/29/24 [id number]

## 2024-06-29 NOTE — DISCHARGE INSTRUCTIONS
Support abdominal wall when coughing or sneezing, avoid heavy lifting.  Return for increased pain, persistent swelling, vomiting, fever or any other concerns.

## 2024-07-01 ENCOUNTER — PATIENT OUTREACH (OUTPATIENT)
Dept: CASE MANAGEMENT | Facility: OTHER | Age: 84
End: 2024-07-01
Payer: MEDICARE

## 2024-07-01 NOTE — OUTREACH NOTE
"AMBULATORY CASE MANAGEMENT NOTE    Names and Relationships of Patient/Support Persons: Caller: Brooke Molina \"JAN\"; Relationship: Self -     Patient Outreach    Pt discharged from [facility] ED on 6/29/24, seen for ventral hernia, gen abd pain. RN-ACM outreach call made to pt. Explained role of RN-ACM and reason for call. Pt states she's doing much better, c/o mild soreness. Reviewed ED AVS with pt. Education provided. Pt states she has call into her PCP for follow up. No questions per pt. Unable to further outreach, pt thanks RN-ACM for calling and ended call. Follow up outreach prn.     Send Education  Questions/Answers      Flowsheet Row Most Recent Value   Other Patient Education/Resources  24/7 Health system Nurse Call Line   24/7 Nurse Call Line Education Method Verbal   Advanced Directives: Patient Has          SDOH updated and reviewed with the patient during this program:  Questionnaire sent via [facility]  Ambulatory Case Management    7/1/2024, 13:31 EDT  "

## 2024-07-09 ENCOUNTER — PREP FOR SURGERY (OUTPATIENT)
Dept: OTHER | Facility: HOSPITAL | Age: 84
End: 2024-07-09
Payer: MEDICARE

## 2024-07-09 ENCOUNTER — OFFICE VISIT (OUTPATIENT)
Dept: SURGERY | Facility: CLINIC | Age: 84
End: 2024-07-09
Payer: MEDICARE

## 2024-07-09 VITALS
BODY MASS INDEX: 28.77 KG/M2 | OXYGEN SATURATION: 97 % | WEIGHT: 179 LBS | SYSTOLIC BLOOD PRESSURE: 141 MMHG | HEART RATE: 68 BPM | DIASTOLIC BLOOD PRESSURE: 70 MMHG | RESPIRATION RATE: 18 BRPM | HEIGHT: 66 IN | TEMPERATURE: 97.7 F

## 2024-07-09 DIAGNOSIS — K42.9 UMBILICAL HERNIA WITHOUT OBSTRUCTION AND WITHOUT GANGRENE: Primary | ICD-10-CM

## 2024-07-09 PROCEDURE — 3078F DIAST BP <80 MM HG: CPT | Performed by: STUDENT IN AN ORGANIZED HEALTH CARE EDUCATION/TRAINING PROGRAM

## 2024-07-09 PROCEDURE — 99203 OFFICE O/P NEW LOW 30 MIN: CPT | Performed by: STUDENT IN AN ORGANIZED HEALTH CARE EDUCATION/TRAINING PROGRAM

## 2024-07-09 PROCEDURE — 1159F MED LIST DOCD IN RCRD: CPT | Performed by: STUDENT IN AN ORGANIZED HEALTH CARE EDUCATION/TRAINING PROGRAM

## 2024-07-09 PROCEDURE — 1160F RVW MEDS BY RX/DR IN RCRD: CPT | Performed by: STUDENT IN AN ORGANIZED HEALTH CARE EDUCATION/TRAINING PROGRAM

## 2024-07-09 PROCEDURE — 3077F SYST BP >= 140 MM HG: CPT | Performed by: STUDENT IN AN ORGANIZED HEALTH CARE EDUCATION/TRAINING PROGRAM

## 2024-07-16 ENCOUNTER — PATIENT ROUNDING (BHMG ONLY) (OUTPATIENT)
Dept: SURGERY | Facility: CLINIC | Age: 84
End: 2024-07-16
Payer: MEDICARE

## 2024-07-16 NOTE — PROGRESS NOTES
July 16, 2024    Hello, may I speak with Brooke AMILCAR Molina?    My name is Emily       I am  with [facility] GENERAL SURGERY  [address] [place] [address].    Before we get started may I verify your date of birth? 1940    I am calling to officially welcome you to our practice and ask about your recent visit. Is this a good time to talk? yes    Tell me about your visit with us. What things went well?  patient was very satisfied with her visit and was very impressed with Dr. Patrick.       We're always looking for ways to make our patients' experiences even better. Do you have recommendations on ways we may improve?  no    Overall were you satisfied with your first visit to our practice? yes       I appreciate you taking the time to speak with me today. Is there anything else I can do for you? no      Thank you, and have a great day.

## 2024-07-30 NOTE — PROGRESS NOTES
"Chief Complaint  Hernia (NP Ref F ER, Ventral Hernia )    Subjective        Brooek Molina presents to [facility] GENERAL SURGERY  History of Present Illness    83-year-old lady with history replacement, appendectomy, hysterectomy who presents to discuss her umbilical pain.  She was seen in the emergency department earlier this month for similar symptoms the hernia was able to be reduced at that time.  She has CT of abdomen pelvis with small about 5 mm umbilical hernia containing fat.  Discussed pain worse with physical activity denies nausea vomiting or obstipation.    Objective   Vital Signs:  /70 (BP Location: Left arm, Patient Position: Sitting, Cuff Size: Adult)   Pulse 68   Temp 97.7 °F (36.5 °C) (Infrared)   Resp 18   Ht 166.4 cm (65.5\")   Wt 81.2 kg (179 lb)   SpO2 97%   BMI 29.33 kg/m²   Estimated body mass index is 29.33 kg/m² as calculated from the following:    Height as of this encounter: 166.4 cm (65.5\").    Weight as of this encounter: 81.2 kg (179 lb).               Physical Exam  Constitutional:       General: She is not in acute distress.     Appearance: Normal appearance. She is not ill-appearing.   HENT:      Head: Normocephalic and atraumatic.      Right Ear: External ear normal.      Left Ear: External ear normal.   Eyes:      Extraocular Movements: Extraocular movements intact.      Conjunctiva/sclera: Conjunctivae normal.   Cardiovascular:      Rate and Rhythm: Normal rate and regular rhythm.   Pulmonary:      Effort: Pulmonary effort is normal. No respiratory distress.   Abdominal:      General: There is no distension.      Palpations: Abdomen is soft.      Tenderness: There is no abdominal tenderness.   Musculoskeletal:         General: No swelling or deformity.   Skin:     General: Skin is warm and dry.   Neurological:      Mental Status: She is alert and oriented to person, place, and time. Mental status is at baseline.        Result Review " :                     Assessment and Plan     Diagnoses and all orders for this visit:    1. Umbilical hernia without obstruction and without gangrene (Primary)          83-year-old lady with history replacement, appendectomy, hysterectomy who presents to discuss her umbilical pain.  She was seen in the emergency department earlier this month for similar symptoms the hernia was able to be reduced at that time.  She has CT of abdomen pelvis with small about 5 mm umbilical hernia containing fat.  Discussed pain worse with physical activity denies nausea vomiting or obstipation.  Discussed options to proceed with robotic repair with mesh versus open primary repair without mesh.  I discussed risk benefits and alternatives to open umbilical hernia repair, including bowel injury, recurrence, bleeding and patient elected to proceed.           Follow Up     No follow-ups on file.  Patient was given instructions and counseling regarding her condition or for health maintenance advice. Please see specific information pulled into the AVS if appropriate.

## 2024-08-08 ENCOUNTER — LAB (OUTPATIENT)
Dept: LAB | Facility: HOSPITAL | Age: 84
End: 2024-08-08
Payer: MEDICARE

## 2024-08-08 ENCOUNTER — HOSPITAL ENCOUNTER (OUTPATIENT)
Dept: CARDIOLOGY | Facility: HOSPITAL | Age: 84
Discharge: HOME | End: 2024-08-08
Payer: MEDICARE

## 2024-08-08 LAB
ANION GAP SERPL CALCULATED.3IONS-SCNC: 8.2 MMOL/L (ref 5–15)
BUN SERPL-MCNC: 29 MG/DL (ref 8–23)
BUN/CREAT SERPL: 35.4 (ref 7–25)
CALCIUM SPEC-SCNC: 9.6 MG/DL (ref 8.6–10.5)
CHLORIDE SERPL-SCNC: 105 MMOL/L (ref 98–107)
CO2 SERPL-SCNC: 28.8 MMOL/L (ref 22–29)
CREAT SERPL-MCNC: 0.82 MG/DL (ref 0.57–1)
DEPRECATED RDW RBC AUTO: 41.6 FL (ref 37–54)
EGFRCR SERPLBLD CKD-EPI 2021: 72 ML/MIN/1.73
ERYTHROCYTE [DISTWIDTH] IN BLOOD BY AUTOMATED COUNT: 12 % (ref 12.3–15.4)
GLUCOSE SERPL-MCNC: 82 MG/DL (ref 65–99)
HCT VFR BLD AUTO: 41.6 % (ref 34–46.6)
HGB BLD-MCNC: 13.8 G/DL (ref 12–15.9)
MCH RBC QN AUTO: 31.7 PG (ref 26.6–33)
MCHC RBC AUTO-ENTMCNC: 33.2 G/DL (ref 31.5–35.7)
MCV RBC AUTO: 95.4 FL (ref 79–97)
PLATELET # BLD AUTO: 225 10*3/MM3 (ref 140–450)
PMV BLD AUTO: 9.9 FL (ref 6–12)
POTASSIUM SERPL-SCNC: 4.3 MMOL/L (ref 3.5–5.2)
RBC # BLD AUTO: 4.36 10*6/MM3 (ref 3.77–5.28)
SODIUM SERPL-SCNC: 142 MMOL/L (ref 136–145)
WBC NRBC COR # BLD AUTO: 5.08 10*3/MM3 (ref 3.4–10.8)

## 2024-08-08 PROCEDURE — 80048 BASIC METABOLIC PNL TOTAL CA: CPT

## 2024-08-08 PROCEDURE — 85027 COMPLETE CBC AUTOMATED: CPT

## 2024-08-08 PROCEDURE — 93005 ELECTROCARDIOGRAM TRACING: CPT | Performed by: STUDENT IN AN ORGANIZED HEALTH CARE EDUCATION/TRAINING PROGRAM

## 2024-08-09 LAB
QT INTERVAL: 407 MS
QTC INTERVAL: 435 MS

## 2024-08-15 RX ORDER — SERTRALINE HYDROCHLORIDE 100 MG/1
TABLET, FILM COATED ORAL
Qty: 90 TABLET | Refills: 1 | OUTPATIENT
Start: 2024-08-15

## 2024-08-16 ENCOUNTER — HOSPITAL ENCOUNTER (OUTPATIENT)
Facility: HOSPITAL | Age: 84
Setting detail: HOSPITAL OUTPATIENT SURGERY
Discharge: HOME | End: 2024-08-16
Attending: STUDENT IN AN ORGANIZED HEALTH CARE EDUCATION/TRAINING PROGRAM | Admitting: STUDENT IN AN ORGANIZED HEALTH CARE EDUCATION/TRAINING PROGRAM
Payer: MEDICARE

## 2024-08-16 ENCOUNTER — ANESTHESIA (OUTPATIENT)
Dept: PERIOP | Facility: HOSPITAL | Age: 84
End: 2024-08-16
Payer: MEDICARE

## 2024-08-16 ENCOUNTER — ANESTHESIA EVENT (OUTPATIENT)
Dept: PERIOP | Facility: HOSPITAL | Age: 84
End: 2024-08-16
Payer: MEDICARE

## 2024-08-16 VITALS
BODY MASS INDEX: 29.32 KG/M2 | HEIGHT: 65 IN | TEMPERATURE: 97.7 F | HEART RATE: 68 BPM | SYSTOLIC BLOOD PRESSURE: 116 MMHG | DIASTOLIC BLOOD PRESSURE: 49 MMHG | WEIGHT: 176 LBS | RESPIRATION RATE: 13 BRPM | OXYGEN SATURATION: 94 %

## 2024-08-16 DIAGNOSIS — K42.9 UMBILICAL HERNIA WITHOUT OBSTRUCTION AND WITHOUT GANGRENE: Primary | ICD-10-CM

## 2024-08-16 PROCEDURE — 25010000002 FENTANYL CITRATE (PF) 100 MCG/2ML SOLUTION: Performed by: NURSE ANESTHETIST, CERTIFIED REGISTERED

## 2024-08-16 PROCEDURE — 88302 TISSUE EXAM BY PATHOLOGIST: CPT | Performed by: STUDENT IN AN ORGANIZED HEALTH CARE EDUCATION/TRAINING PROGRAM

## 2024-08-16 PROCEDURE — 25010000002 HYDROMORPHONE 1 MG/ML SOLUTION: Performed by: NURSE ANESTHETIST, CERTIFIED REGISTERED

## 2024-08-16 PROCEDURE — 49592 RPR AA HRN 1ST < 3 NCR/STRN: CPT

## 2024-08-16 PROCEDURE — 25010000002 PROPOFOL 200 MG/20ML EMULSION: Performed by: NURSE ANESTHETIST, CERTIFIED REGISTERED

## 2024-08-16 PROCEDURE — 25010000002 CEFAZOLIN PER 500 MG: Performed by: STUDENT IN AN ORGANIZED HEALTH CARE EDUCATION/TRAINING PROGRAM

## 2024-08-16 PROCEDURE — 25010000002 ONDANSETRON PER 1 MG: Performed by: NURSE ANESTHETIST, CERTIFIED REGISTERED

## 2024-08-16 PROCEDURE — C1729 CATH, DRAINAGE: HCPCS | Performed by: STUDENT IN AN ORGANIZED HEALTH CARE EDUCATION/TRAINING PROGRAM

## 2024-08-16 PROCEDURE — 49592 RPR AA HRN 1ST < 3 NCR/STRN: CPT | Performed by: STUDENT IN AN ORGANIZED HEALTH CARE EDUCATION/TRAINING PROGRAM

## 2024-08-16 PROCEDURE — 25010000002 SUGAMMADEX 200 MG/2ML SOLUTION: Performed by: NURSE ANESTHETIST, CERTIFIED REGISTERED

## 2024-08-16 PROCEDURE — 25010000002 BUPIVACAINE (PF) 0.25 % SOLUTION: Performed by: STUDENT IN AN ORGANIZED HEALTH CARE EDUCATION/TRAINING PROGRAM

## 2024-08-16 PROCEDURE — 25810000003 LACTATED RINGERS PER 1000 ML: Performed by: STUDENT IN AN ORGANIZED HEALTH CARE EDUCATION/TRAINING PROGRAM

## 2024-08-16 PROCEDURE — S0260 H&P FOR SURGERY: HCPCS | Performed by: STUDENT IN AN ORGANIZED HEALTH CARE EDUCATION/TRAINING PROGRAM

## 2024-08-16 PROCEDURE — 25010000002 DEXAMETHASONE PER 1 MG: Performed by: NURSE ANESTHETIST, CERTIFIED REGISTERED

## 2024-08-16 RX ORDER — NALOXONE HCL 0.4 MG/ML
0.2 VIAL (ML) INJECTION AS NEEDED
Status: DISCONTINUED | OUTPATIENT
Start: 2024-08-16 | End: 2024-08-16 | Stop reason: HOSPADM

## 2024-08-16 RX ORDER — LIDOCAINE HYDROCHLORIDE 20 MG/ML
INJECTION, SOLUTION EPIDURAL; INFILTRATION; INTRACAUDAL; PERINEURAL AS NEEDED
Status: DISCONTINUED | OUTPATIENT
Start: 2024-08-16 | End: 2024-08-16 | Stop reason: SURG

## 2024-08-16 RX ORDER — DROPERIDOL 2.5 MG/ML
0.62 INJECTION, SOLUTION INTRAMUSCULAR; INTRAVENOUS
Status: DISCONTINUED | OUTPATIENT
Start: 2024-08-16 | End: 2024-08-16 | Stop reason: HOSPADM

## 2024-08-16 RX ORDER — LIDOCAINE HYDROCHLORIDE 10 MG/ML
0.5 INJECTION, SOLUTION INFILTRATION; PERINEURAL ONCE AS NEEDED
Status: DISCONTINUED | OUTPATIENT
Start: 2024-08-16 | End: 2024-08-16 | Stop reason: HOSPADM

## 2024-08-16 RX ORDER — HYDROCODONE BITARTRATE AND ACETAMINOPHEN 5; 325 MG/1; MG/1
1 TABLET ORAL EVERY 6 HOURS PRN
Qty: 15 TABLET | Refills: 0 | Status: SHIPPED | OUTPATIENT
Start: 2024-08-16 | End: 2024-08-26

## 2024-08-16 RX ORDER — SODIUM CHLORIDE 0.9 % (FLUSH) 0.9 %
10 SYRINGE (ML) INJECTION AS NEEDED
Status: DISCONTINUED | OUTPATIENT
Start: 2024-08-16 | End: 2024-08-16 | Stop reason: HOSPADM

## 2024-08-16 RX ORDER — LABETALOL HYDROCHLORIDE 5 MG/ML
5 INJECTION, SOLUTION INTRAVENOUS
Status: DISCONTINUED | OUTPATIENT
Start: 2024-08-16 | End: 2024-08-16 | Stop reason: HOSPADM

## 2024-08-16 RX ORDER — ONDANSETRON 4 MG/1
4 TABLET, FILM COATED ORAL EVERY 8 HOURS PRN
Qty: 30 TABLET | Refills: 1 | Status: SHIPPED | OUTPATIENT
Start: 2024-08-16 | End: 2025-08-16

## 2024-08-16 RX ORDER — PROMETHAZINE HYDROCHLORIDE 25 MG/1
25 SUPPOSITORY RECTAL ONCE AS NEEDED
Status: DISCONTINUED | OUTPATIENT
Start: 2024-08-16 | End: 2024-08-16 | Stop reason: HOSPADM

## 2024-08-16 RX ORDER — ONDANSETRON 2 MG/ML
INJECTION INTRAMUSCULAR; INTRAVENOUS AS NEEDED
Status: DISCONTINUED | OUTPATIENT
Start: 2024-08-16 | End: 2024-08-16 | Stop reason: SURG

## 2024-08-16 RX ORDER — BUPIVACAINE HYDROCHLORIDE 2.5 MG/ML
INJECTION, SOLUTION EPIDURAL; INFILTRATION; INTRACAUDAL AS NEEDED
Status: DISCONTINUED | OUTPATIENT
Start: 2024-08-16 | End: 2024-08-16 | Stop reason: HOSPADM

## 2024-08-16 RX ORDER — DIPHENHYDRAMINE HYDROCHLORIDE 50 MG/ML
12.5 INJECTION INTRAMUSCULAR; INTRAVENOUS
Status: DISCONTINUED | OUTPATIENT
Start: 2024-08-16 | End: 2024-08-16 | Stop reason: HOSPADM

## 2024-08-16 RX ORDER — FLUMAZENIL 0.1 MG/ML
0.2 INJECTION INTRAVENOUS AS NEEDED
Status: DISCONTINUED | OUTPATIENT
Start: 2024-08-16 | End: 2024-08-16 | Stop reason: HOSPADM

## 2024-08-16 RX ORDER — DEXAMETHASONE SODIUM PHOSPHATE 4 MG/ML
INJECTION, SOLUTION INTRA-ARTICULAR; INTRALESIONAL; INTRAMUSCULAR; INTRAVENOUS; SOFT TISSUE AS NEEDED
Status: DISCONTINUED | OUTPATIENT
Start: 2024-08-16 | End: 2024-08-16 | Stop reason: SURG

## 2024-08-16 RX ORDER — ONDANSETRON 2 MG/ML
4 INJECTION INTRAMUSCULAR; INTRAVENOUS ONCE AS NEEDED
Status: DISCONTINUED | OUTPATIENT
Start: 2024-08-16 | End: 2024-08-16 | Stop reason: HOSPADM

## 2024-08-16 RX ORDER — PROPOFOL 10 MG/ML
INJECTION, EMULSION INTRAVENOUS AS NEEDED
Status: DISCONTINUED | OUTPATIENT
Start: 2024-08-16 | End: 2024-08-16 | Stop reason: SURG

## 2024-08-16 RX ORDER — HYDRALAZINE HYDROCHLORIDE 20 MG/ML
5 INJECTION INTRAMUSCULAR; INTRAVENOUS
Status: DISCONTINUED | OUTPATIENT
Start: 2024-08-16 | End: 2024-08-16 | Stop reason: HOSPADM

## 2024-08-16 RX ORDER — ROCURONIUM BROMIDE 10 MG/ML
INJECTION, SOLUTION INTRAVENOUS AS NEEDED
Status: DISCONTINUED | OUTPATIENT
Start: 2024-08-16 | End: 2024-08-16 | Stop reason: SURG

## 2024-08-16 RX ORDER — FENTANYL CITRATE 50 UG/ML
50 INJECTION, SOLUTION INTRAMUSCULAR; INTRAVENOUS
Status: DISCONTINUED | OUTPATIENT
Start: 2024-08-16 | End: 2024-08-16 | Stop reason: HOSPADM

## 2024-08-16 RX ORDER — PROMETHAZINE HYDROCHLORIDE 25 MG/1
25 TABLET ORAL ONCE AS NEEDED
Status: DISCONTINUED | OUTPATIENT
Start: 2024-08-16 | End: 2024-08-16 | Stop reason: HOSPADM

## 2024-08-16 RX ORDER — HYDROCODONE BITARTRATE AND ACETAMINOPHEN 5; 325 MG/1; MG/1
1 TABLET ORAL ONCE AS NEEDED
Status: COMPLETED | OUTPATIENT
Start: 2024-08-16 | End: 2024-08-16

## 2024-08-16 RX ORDER — POLYETHYLENE GLYCOL 3350 17 G/17G
17 POWDER, FOR SOLUTION ORAL DAILY
Qty: 238 G | Refills: 0 | Status: SHIPPED | OUTPATIENT
Start: 2024-08-16 | End: 2024-08-26

## 2024-08-16 RX ORDER — SODIUM CHLORIDE, SODIUM LACTATE, POTASSIUM CHLORIDE, CALCIUM CHLORIDE 600; 310; 30; 20 MG/100ML; MG/100ML; MG/100ML; MG/100ML
1000 INJECTION, SOLUTION INTRAVENOUS CONTINUOUS
Status: DISCONTINUED | OUTPATIENT
Start: 2024-08-16 | End: 2024-08-16 | Stop reason: HOSPADM

## 2024-08-16 RX ORDER — IPRATROPIUM BROMIDE AND ALBUTEROL SULFATE 2.5; .5 MG/3ML; MG/3ML
3 SOLUTION RESPIRATORY (INHALATION) ONCE AS NEEDED
Status: DISCONTINUED | OUTPATIENT
Start: 2024-08-16 | End: 2024-08-16 | Stop reason: HOSPADM

## 2024-08-16 RX ORDER — FENTANYL CITRATE 50 UG/ML
INJECTION, SOLUTION INTRAMUSCULAR; INTRAVENOUS AS NEEDED
Status: DISCONTINUED | OUTPATIENT
Start: 2024-08-16 | End: 2024-08-16 | Stop reason: SURG

## 2024-08-16 RX ADMIN — HYDROMORPHONE HYDROCHLORIDE 0.5 MG: 1 INJECTION, SOLUTION INTRAMUSCULAR; INTRAVENOUS; SUBCUTANEOUS at 14:59

## 2024-08-16 RX ADMIN — DEXAMETHASONE SODIUM PHOSPHATE 4 MG: 4 INJECTION, SOLUTION INTRAMUSCULAR; INTRAVENOUS at 13:41

## 2024-08-16 RX ADMIN — ROCURONIUM BROMIDE 50 MG: 10 INJECTION, SOLUTION INTRAVENOUS at 13:38

## 2024-08-16 RX ADMIN — SODIUM CHLORIDE 2000 MG: 900 INJECTION INTRAVENOUS at 13:28

## 2024-08-16 RX ADMIN — ONDANSETRON 4 MG: 2 INJECTION INTRAMUSCULAR; INTRAVENOUS at 13:41

## 2024-08-16 RX ADMIN — SODIUM CHLORIDE, POTASSIUM CHLORIDE, SODIUM LACTATE AND CALCIUM CHLORIDE 1000 ML: 600; 310; 30; 20 INJECTION, SOLUTION INTRAVENOUS at 13:14

## 2024-08-16 RX ADMIN — FENTANYL CITRATE 50 MCG: 50 INJECTION, SOLUTION INTRAMUSCULAR; INTRAVENOUS at 14:29

## 2024-08-16 RX ADMIN — PROPOFOL 150 MG: 10 INJECTION, EMULSION INTRAVENOUS at 13:38

## 2024-08-16 RX ADMIN — HYDROCODONE BITARTRATE AND ACETAMINOPHEN 1 TABLET: 5; 325 TABLET ORAL at 15:00

## 2024-08-16 RX ADMIN — LIDOCAINE HYDROCHLORIDE 100 MG: 20 INJECTION, SOLUTION EPIDURAL; INFILTRATION; INTRACAUDAL; PERINEURAL at 13:38

## 2024-08-16 RX ADMIN — SUGAMMADEX 200 MG: 100 INJECTION, SOLUTION INTRAVENOUS at 14:24

## 2024-08-16 RX ADMIN — FENTANYL CITRATE 50 MCG: 50 INJECTION, SOLUTION INTRAMUSCULAR; INTRAVENOUS at 13:38

## 2024-08-16 NOTE — ANESTHESIA PREPROCEDURE EVALUATION
Anesthesia Evaluation     history of anesthetic complications:  PONV  NPO Solid Status: > 8 hours  NPO Liquid Status: > 8 hours           Airway   Mallampati: II  TM distance: >3 FB  Neck ROM: full  No difficulty expected, Small opening and Narrow palate  Dental - normal exam     Pulmonary - normal exam   Cardiovascular - normal exam    (+) hypertension well controlled, valvular problems/murmurs, dysrhythmias Atrial Fib, PVD, hyperlipidemia      Neuro/Psych  (+) headaches, numbness, psychiatric history  GI/Hepatic/Renal/Endo    (+) GERD, thyroid problem thyroid nodules    Musculoskeletal     Abdominal  - normal exam    Bowel sounds: normal.   Substance History      OB/GYN          Other   arthritis,                 Anesthesia Plan    ASA 3     general     intravenous induction     Anesthetic plan, risks, benefits, and alternatives have been provided, discussed and informed consent has been obtained with: patient.  Pre-procedure education provided  Plan discussed with CRNA.    CODE STATUS:

## 2024-08-16 NOTE — OP NOTE
Operative Report:    Patient Name:  Brooke FITZGERALD Molina  YOB: 1940    Date of Surgery:  8/16/2024     Indications:    83-year-old lady with history replacement, appendectomy, hysterectomy who presents to discuss her umbilical pain. She was seen in the emergency department earlier this month for similar symptoms the hernia was able to be reduced at that time. She has CT of abdomen pelvis with small about 5 mm umbilical hernia containing fat. Discussed pain worse with physical activity denies nausea vomiting or obstipation. Discussed options to proceed with robotic repair with mesh versus open primary repair without mesh. I discussed risk benefits and alternatives to open umbilical hernia repair, including bowel injury, recurrence, bleeding and patient elected to proceed.     Pre-op Diagnosis:   Umbilical hernia without obstruction and without gangrene [K42.9]       Post-Op Diagnosis Codes:     * Umbilical hernia without obstruction and without gangrene [K42.9]    Procedure/CPT® Codes:      Procedure(s):  Open UMBILICAL HERNIA REPAIR, less than 3 cm, incarcerated initial repair    Staff:  Surgeon(s):  Derek Patrick MD    Circulator: Linda Davis RN  Scrub Person: Arabella Suarez  Assistant: Erika Fernandez PA  was responsible for performing the following activities: Retraction, Suction, Irrigation, Suturing, Closing, and Placing Dressing and their skilled assistance was necessary for the success of this case.        Anesthesia: General    Estimated Blood Loss: minimal    Implants:    Nothing was implanted during the procedure    Specimen:          Specimens       ID Source Type Tests Collected By Collected At Frozen?    A Hernia, Sac Tissue TISSUE PATHOLOGY EXAM   Derek Patrick MD 8/16/24 2138 No    Description: umbilical                Findings: 1 cm umbilical hernia containing incarcerated preperitoneal fat    Complications: None    Description of Procedure:   After his benefits and  alternatives were discussed with patient informed sent was obtained.  She was transported the operating room placed upon the operative table and underwent general anesthesia.  Her abdomen was prepped and draped in sterile fashion surgical timeout completed.  Using 15 blade scalpel I made a curvilinear incision just inferior to the umbilicus.  He is electrocautery to dissect down to the fascia.  I used electrocautery to dissect the umbilical stalk off of the top of the hernia sac.  I then identified healthy fascia circumferentially around the defect.  I resected the hernia sac and incarcerated preperitoneal fat with electrocautery this was sent to pathology.  The defect was closed with interrupted 0 Ethibond sutures.  Irrigated the wound use electrocautery to obtain hemostasis.  I reapproximated the umbilical stalk back to the fascia with an interrupted 3-0 Vicryl suture.  Placed a layer of interrupted 3-0 Vicryl deep dermal sutures and skin was reapproximated with running 4-0 Vicryl suture.  Skin was dressed with Mastisol and Steri-Strips.  Patient was woken from general anesthesia transported recovery unit without incident.      Derek Patrick MD     Date: 8/16/2024  Time: 17:55 EDT    This note was created using Dragon Voice Recognition software.

## 2024-08-16 NOTE — ANESTHESIA PROCEDURE NOTES
Airway  Urgency: elective    Date/Time: 8/16/2024 1:40 PM  Airway not difficult    General Information and Staff    Patient location during procedure: OR  CRNA/CAA: Bing Valera, CRNA    Indications and Patient Condition  Indications for airway management: airway protection    Preoxygenated: yes  Mask difficulty assessment: 1 - vent by mask    Final Airway Details  Final airway type: endotracheal airway      Successful airway: ETT  Cuffed: yes   Successful intubation technique: direct laryngoscopy  Facilitating devices/methods: intubating stylet  Endotracheal tube insertion site: oral  Blade: [place]  Blade size: 3  ETT size (mm): 7.0  Cormack-Lehane Classification: grade I - full view of glottis  Placement verified by: chest auscultation and capnometry   Cuff volume (mL): 8  Measured from: lips  ETT/EBT  to lips (cm): 19  Number of attempts at approach: 1    Additional Comments  Atraumatic placement of ETT, dentition unchanged from preop.

## 2024-08-16 NOTE — H&P
General Surgery History and Physical Exam      Name: Brooke Molina ADMIT: 2024   : 1940  PCP: Dennys Wheat MD    MRN: [id number] LOS: 0 days   AGE/SEX: 83 y.o. female  ROOM: [facility]      Patient Care Team:  Dennys Wheat MD as PCP - General (Internal Medicine)  [facility] DENNYS WHEAT MD Dawson, Timothy, MD as Consulting Physician (General Surgery)  No chief complaint on file.      Subjective   83-year-old lady with history replacement, appendectomy, hysterectomy who presents to discuss her umbilical pain.  She was seen in the emergency department earlier this month for similar symptoms the hernia was able to be reduced at that time.  She has CT of abdomen pelvis with small about 5 mm umbilical hernia containing fat.  Discussed pain worse with physical activity denies nausea vomiting or obstipation.  Discussed options to proceed with robotic repair with mesh versus open primary repair without mesh.  I discussed risk benefits and alternatives to open umbilical hernia repair, including bowel injury, recurrence, bleeding and patient elected to proceed.         Past Medical History:   Diagnosis Date    Aortic stenosis, severe     Arthritis     Atypical migraine 2019    Excessive daytime sleepiness     Hyperlipidemia     Measles     Migraine     Mitral valve insufficiency     Mild to Moderate--S/p MVR    Paroxysmal atrial fibrillation 2016    PONV (postoperative nausea and vomiting)     Thyroid disease     Valvular heart disease 2019    AV replacement  Tissue 2013     Past Surgical History:   Procedure Laterality Date    AORTIC VALVE REPAIR/REPLACEMENT  13- [facility]    APPENDECTOMY      BREAST BIOPSY      [facility]    HYSTERECTOMY      REDUCTION MAMMAPLASTY      STERNOTOMY  13- [facility]    THYROID SURGERY       Family History   Problem Relation Age of Onset    Dementia  Mother     Heart disease Mother     Heart disease Father     Cancer Sister     Multiple sclerosis Sister     Breast cancer Sister     Breast cancer Daughter     Migraines Daughter     Dementia Maternal Grandmother     Heart disease Paternal Grandmother     Heart disease Maternal Grandfather        Social History     Tobacco Use    Smoking status: Never     Passive exposure: Never    Smokeless tobacco: Never   Vaping Use    Vaping status: Never Used   Substance Use Topics    Alcohol use: Yes     Comment: Social    Drug use: No     Medications Prior to Admission   Medication Sig Dispense Refill Last Dose    amitriptyline (ELAVIL) 25 MG tablet TAKE ONE TABLET BY MOUTH ONCE NIGHTLY 90 tablet 0 8/15/2024    aspirin 81 MG chewable tablet Chew 1 tablet Daily.   8/12/2024    Ezetimibe (ZETIA PO) Take  by mouth.   8/15/2024    hydroxychloroquine (PLAQUENIL) 200 MG tablet Take  by mouth Daily.   8/15/2024    metoprolol tartrate (LOPRESSOR) 50 MG tablet TAKE ONE TABLET BY MOUTH TWICE A  tablet 3 8/16/2024 at 0800    multivitamin (THERAGRAN) tablet tablet Take 1 tablet by mouth Daily.   8/9/2024    Omega-3 Fatty Acids (FISH OIL) 1000 MG capsule capsule Take 1 capsule by mouth Daily With Breakfast.   8/9/2024    sertraline (ZOLOFT) 100 MG tablet TAKE ONE TABLET BY MOUTH DAILY 180 tablet 1 8/15/2024    topiramate (TOPAMAX) 100 MG tablet TAKE ONE TABLET BY MOUTH DAILY 90 tablet 2 8/15/2024    vitamin B-12 (CYANOCOBALAMIN) 500 MCG tablet Take 1 tablet by mouth Daily.   8/9/2024    vitamin C (ASCORBIC ACID) 250 MG tablet Take 1 tablet by mouth Daily.   8/9/2024    Zinc Sulfate (ZINC 15 PO) Take  by mouth.   8/9/2024     ceFAZolin, 2,000 mg, Intravenous, Once      lactated ringers, 1,000 mL        lidocaine    sodium chloride  Codeine    Review of Systems   Constitutional:  Negative for chills and fever.   HENT:  Negative for rhinorrhea and trouble swallowing.    Eyes:  Negative for blurred vision and double vision.  "  Respiratory:  Negative for cough and shortness of breath.    Cardiovascular:  Negative for chest pain and palpitations.   Gastrointestinal:  Negative for abdominal distention and abdominal pain.   Musculoskeletal:  Negative for back pain and myalgias.   Skin:  Negative for color change and dry skin.   Neurological:  Negative for headache and confusion.   Psychiatric/Behavioral:  Negative for agitation and hallucinations.         Objective     Vital Signs and Labs:  Vital Signs Patient Vitals for the past 24 hrs:   BP Temp Temp src Pulse Resp SpO2 Height Weight   08/16/24 1241 122/66 97.9 °F (36.6 °C) Oral 69 12 95 % 165.1 cm (65\") 79.8 kg (176 lb)     I/O:  No intake/output data recorded.    Physical Exam:  Physical Exam  Constitutional:       General: She is not in acute distress.     Appearance: Normal appearance. She is not ill-appearing.   HENT:      Head: Normocephalic and atraumatic.      Right Ear: External ear normal.      Left Ear: External ear normal.   Eyes:      Extraocular Movements: Extraocular movements intact.      Conjunctiva/sclera: Conjunctivae normal.   Cardiovascular:      Rate and Rhythm: Normal rate and regular rhythm.   Pulmonary:      Effort: Pulmonary effort is normal. No respiratory distress.   Abdominal:      General: There is no distension.      Palpations: Abdomen is soft.      Tenderness: There is no abdominal tenderness.   Musculoskeletal:         General: No swelling or deformity.   Skin:     General: Skin is warm and dry.   Neurological:      Mental Status: She is alert and oriented to person, place, and time. Mental status is at baseline.         CBC      BMP     Radiology(recent) No radiology results for the last day    I reviewed the patient's new clinical results.    Assessment & Plan       Umbilical hernia without obstruction and without gangrene      83-year-old lady with history replacement, appendectomy, hysterectomy who presents to discuss her umbilical pain.  She was " seen in the emergency department earlier this month for similar symptoms the hernia was able to be reduced at that time.  She has CT of abdomen pelvis with small about 5 mm umbilical hernia containing fat.  Discussed pain worse with physical activity denies nausea vomiting or obstipation.  Discussed options to proceed with robotic repair with mesh versus open primary repair without mesh.  I discussed risk benefits and alternatives to open umbilical hernia repair, including bowel injury, recurrence, bleeding and patient elected to proceed.           This note was created using Dragon Voice Recognition software.    Derek Patrick MD  08/16/24  12:52 EDT

## 2024-08-17 NOTE — ANESTHESIA POSTPROCEDURE EVALUATION
Patient: Brooke Molina    Procedure Summary       Date: 08/16/24 Room / Location: [facility] OR 08 / [facility] MAIN OR    Anesthesia Start: 1331 Anesthesia Stop: 1430    Procedure: UMBILICAL HERNIA REPAIR (Abdomen) Diagnosis:       Umbilical hernia without obstruction and without gangrene      (Umbilical hernia without obstruction and without gangrene [K42.9])    Surgeons: Derek Patrick MD Provider: Tony Isaacs MD    Anesthesia Type: general ASA Status: 3            Anesthesia Type: general    Vitals  Vitals Value Taken Time   /46 08/16/24 1531   Temp 97.6 °F (36.4 °C) 08/16/24 1531   Pulse 60 08/16/24 1532   Resp 14 08/16/24 1531   SpO2 94 % 08/16/24 1532   Vitals shown include unfiled device data.        Post Anesthesia Care and Evaluation    Patient location during evaluation: PACU  Patient participation: complete - patient participated  Level of consciousness: awake  Pain scale: See nurse's notes for pain score.  Pain management: adequate    Airway patency: patent  Anesthetic complications: No anesthetic complications  PONV Status: none  Cardiovascular status: acceptable  Respiratory status: acceptable and spontaneous ventilation  Hydration status: acceptable    Comments: Patient seen and examined postoperatively; vital signs stable; SpO2 greater than or equal to 90%; cardiopulmonary status stable; nausea/vomiting adequately controlled; pain adequately controlled; no apparent anesthesia complications; patient discharged from anesthesia care when discharge criteria were met

## 2024-08-19 ENCOUNTER — TELEPHONE (OUTPATIENT)
Dept: SURGERY | Facility: CLINIC | Age: 84
End: 2024-08-19

## 2024-08-19 LAB
LAB AP CASE REPORT: NORMAL
PATH REPORT.FINAL DX SPEC: NORMAL
PATH REPORT.GROSS SPEC: NORMAL

## 2024-08-20 ENCOUNTER — TELEPHONE (OUTPATIENT)
Dept: SURGERY | Facility: CLINIC | Age: 84
End: 2024-08-20
Payer: MEDICARE

## 2024-08-26 ENCOUNTER — OFFICE VISIT (OUTPATIENT)
Dept: SURGERY | Facility: CLINIC | Age: 84
End: 2024-08-26
Payer: MEDICARE

## 2024-08-26 VITALS
SYSTOLIC BLOOD PRESSURE: 139 MMHG | OXYGEN SATURATION: 96 % | DIASTOLIC BLOOD PRESSURE: 70 MMHG | TEMPERATURE: 97.8 F | WEIGHT: 181 LBS | BODY MASS INDEX: 30.16 KG/M2 | HEIGHT: 65 IN | HEART RATE: 78 BPM

## 2024-08-26 DIAGNOSIS — K42.9 UMBILICAL HERNIA WITHOUT OBSTRUCTION AND WITHOUT GANGRENE: Primary | ICD-10-CM

## 2024-08-26 PROCEDURE — 1160F RVW MEDS BY RX/DR IN RCRD: CPT

## 2024-08-26 PROCEDURE — 99024 POSTOP FOLLOW-UP VISIT: CPT

## 2024-08-26 PROCEDURE — 3078F DIAST BP <80 MM HG: CPT

## 2024-08-26 PROCEDURE — 1159F MED LIST DOCD IN RCRD: CPT

## 2024-08-26 PROCEDURE — 3075F SYST BP GE 130 - 139MM HG: CPT

## 2024-08-26 NOTE — PROGRESS NOTES
"Chief Complaint  Post-op Follow-up (Po 2 week follow up umbilical hernia repair 8-16-24)    Subjective        Brooke Molina presents to [facility] GENER[facility] s/p umbilical hernia repair on 8/16/2024.  Overall, doing well.  No new complaints.  Denies pain.  Tolerating diet, denies nausea and vomiting.  Having regular bowel function.    Objective   Vital Signs:  /70 (BP Location: Left arm, Patient Position: Sitting, Cuff Size: Adult)   Pulse 78   Temp 97.8 °F (36.6 °C) (Infrared)   Ht 165.1 cm (65\")   Wt 82.1 kg (181 lb)   SpO2 96%   BMI 30.12 kg/m²   Estimated body mass index is 30.12 kg/m² as calculated from the following:    Height as of this encounter: 165.1 cm (65\").    Weight as of this encounter: 82.1 kg (181 lb).            Physical Exam   Result Review :                Assessment and Plan   Diagnoses and all orders for this visit:    1. Umbilical hernia without obstruction and without gangrene (Primary)    s/p umbilical hernia repair on 8/16/2024.  Overall, doing well.  No new complaints.  Denies pain.  Tolerating diet, denies nausea and vomiting.  Having regular bowel function.  Discussed pathology.  Do not lift greater than 30 lbs for 6 weeks postop.  Follow-up in 1 month         Follow Up   No follow-ups on file.  Patient was given instructions and counseling regarding her condition or for health maintenance advice. Please see specific information pulled into the AVS if appropriate.             "

## 2024-09-23 ENCOUNTER — OFFICE VISIT (OUTPATIENT)
Dept: SURGERY | Facility: CLINIC | Age: 84
End: 2024-09-23
Payer: MEDICARE

## 2024-09-23 VITALS
TEMPERATURE: 97.8 F | HEART RATE: 70 BPM | WEIGHT: 179 LBS | DIASTOLIC BLOOD PRESSURE: 62 MMHG | HEIGHT: 65 IN | SYSTOLIC BLOOD PRESSURE: 134 MMHG | BODY MASS INDEX: 29.82 KG/M2 | OXYGEN SATURATION: 98 %

## 2024-09-23 DIAGNOSIS — K42.9 UMBILICAL HERNIA WITHOUT OBSTRUCTION AND WITHOUT GANGRENE: Primary | ICD-10-CM

## 2024-09-23 PROCEDURE — 3075F SYST BP GE 130 - 139MM HG: CPT

## 2024-09-23 PROCEDURE — 1160F RVW MEDS BY RX/DR IN RCRD: CPT

## 2024-09-23 PROCEDURE — 99024 POSTOP FOLLOW-UP VISIT: CPT

## 2024-09-23 PROCEDURE — 1159F MED LIST DOCD IN RCRD: CPT

## 2024-09-23 PROCEDURE — 3078F DIAST BP <80 MM HG: CPT

## 2024-09-23 RX ORDER — LEVOTHYROXINE SODIUM 50 UG/1
TABLET ORAL
COMMUNITY
Start: 2024-09-20

## 2024-09-23 RX ORDER — EZETIMIBE 10 MG/1
TABLET ORAL
COMMUNITY
Start: 2024-08-29

## 2024-10-28 ENCOUNTER — OFFICE VISIT (OUTPATIENT)
Dept: CARDIOLOGY | Facility: CLINIC | Age: 84
End: 2024-10-28
Payer: MEDICARE

## 2024-10-28 VITALS
HEIGHT: 65 IN | OXYGEN SATURATION: 98 % | WEIGHT: 183 LBS | SYSTOLIC BLOOD PRESSURE: 107 MMHG | RESPIRATION RATE: 18 BRPM | HEART RATE: 74 BPM | BODY MASS INDEX: 30.49 KG/M2 | DIASTOLIC BLOOD PRESSURE: 69 MMHG

## 2024-10-28 DIAGNOSIS — Z00.00 PREVENTATIVE HEALTH CARE: Primary | ICD-10-CM

## 2024-10-28 DIAGNOSIS — I35.0 AORTIC STENOSIS, SEVERE: ICD-10-CM

## 2024-10-28 PROCEDURE — 3074F SYST BP LT 130 MM HG: CPT | Performed by: INTERNAL MEDICINE

## 2024-10-28 PROCEDURE — G2211 COMPLEX E/M VISIT ADD ON: HCPCS | Performed by: INTERNAL MEDICINE

## 2024-10-28 PROCEDURE — 93000 ELECTROCARDIOGRAM COMPLETE: CPT | Performed by: INTERNAL MEDICINE

## 2024-10-28 PROCEDURE — 3078F DIAST BP <80 MM HG: CPT | Performed by: INTERNAL MEDICINE

## 2024-10-28 PROCEDURE — 99214 OFFICE O/P EST MOD 30 MIN: CPT | Performed by: INTERNAL MEDICINE

## 2024-10-28 NOTE — PROGRESS NOTES
Cardiology Clinic Note  Evert Bernstein MD, PhD    Subjective:     Encounter Date:10/28/2024      Patient ID: Brooke Molina is a 84 y.o. female.    Chief Complaint:  Chief Complaint   Patient presents with    Follow-up       HPI:        I the pleasure to see this very pleasant 84-year-old female in follow-up today established with cardiology with history of aortic valve stenosis and mitral valve insufficiency, paroxysmal atrial fibrillation hypertension hyperlipidemia, last stress test and echo evaluation was in March 2021.  She received valve replacement with 21 mm bioprosthetic stentless Mckeon valve previously which was seen to be normal functioning.  EF 55 to 60% with mild to moderate concentric LVH, grade 1 diastolic dysfunction.  She is also had ABIs in the last 12 months which demonstrated normal bilateral ABIs no evidence of obstructive disease.  Stress testing in March 2021 revealed an EF of 55%, normal myocardial perfusion with a low risk study.  She presents back today otherwise blood pressure well controlled 120 systolic heart rates in the 70s.  She is doing well.  She is in sinus rhythm today with nonspecific ST-T wave abnormalities in the precordial leads.  She denies any chest pain shortness of breath and otherwise says she is doing well, she is euvolemic with no heart failure signs or symptoms.   She denies any new unstable angina syncope palpitations or other complaints.  We discussed repeat 2D echo next year for evaluation of her AVR remotely 2013, no echo in the last 3 to 4 years for reassessment.     Review of systems otherwise negative x14 point review of systems except was mentioned above        Historical data copied forward from previous encounters in EMR including the history, exam, and assessment/plan has been reviewed and is unchanged unless noted otherwise.     Cardiac medicines reviewed with risk, benefits, and necessity of each discussed.     Risk and benefit of cardiac testing  "reviewed including death heart attack stroke pain bleeding infection need for vascular /cardiovascular surgery were discussed and the patient      Objective:      Vitals reviewed below     Physical Exam  Regular rate and rhythm with no rubs gallops heave or lift  2 out of 6 systolic ejection murmur lower sternal border unchanged, preserved S2  Sternotomy clean and dry  Well-healed  Radial pulses 2+ equal bilaterally next normal cap refill  No carotid bruits or JVD  Clear to auscultation  Small rash lower extremities below the knees     Assessment:          Valvular heart disease, AS/ bioprosthetic AVR remotely 2013, normal functioning  Aspirin for life  Antibiotic prophylaxis prior to any surgical procedure  Last echo 2021  Normal gradients, murmur present with preserved S2       Paroxysmal A. fib, sinus rhythm, doing well without recurrence, aspirin, beta-blocker     Hyperlipidemia, Repatha on board, follow lipids previously was at goal no need for adjustment     Palpitations, asymptomatic presently, continue beta-blocker     Essential hypertension well-controlled 120 systolic today     History of PAD, normal ABIs within the last year     Diet and exercise per AHA guidelines, she is exercising regularly down nearly 30 pounds from prior encounter last year  Normal bereavement with loss of her  earlier this year 2023  Good functional status overall  EKG is unchanged  Low-cholesterol Mediterranean diet recommended  Routine activity  See her back in 1 year    Continue present medical therapy highlighted above      Evert Bernstein MD, PhD    Objective:         /69 (BP Location: Right arm, Patient Position: Sitting)   Pulse 74   Resp 18   Ht 165.1 cm (65\")   Wt 83 kg (183 lb)   SpO2 98%   BMI 30.45 kg/m²             The pleasure to be involved in this patient's cardiovascular care.  Please call with any questions or concerns  Evert Bernstein MD, PhD    Most recent EKG as reviewed and interpreted by " me:    ECG 12 Lead    Date/Time: 10/28/2024 1:26 PM  Performed by: Evert Bernstein MD    Authorized by: Evert Bernstein MD  Comparison: not compared with previous ECG   Previous ECG: no previous ECG available  Rhythm: sinus rhythm  Rate: normal  Conduction: conduction normal  QRS axis: normal  Other findings: non-specific ST-T wave changes    Clinical impression: non-specific ECG           Most recent echo as reviewed and interpreted by me:  Results for orders placed during the hospital encounter of 03/08/21    Adult Transthoracic Echo Complete W/ Cont if Necessary Per Protocol    Interpretation Summary  · Left ventricular systolic function is normal.  · Left ventricular ejection fraction is 55 to 60%  · Left ventricular wall thickness is consistent with mild to moderate concentric hypertrophy.  · Left ventricular diastolic function is consistent with (grade I) impaired relaxation.  · There is a bioprosthetic aortic valve present. It appears to be stentless bioprosthetic valve.      Most recent stress test as reviewed and interpreted by me:  Results for orders placed during the hospital encounter of 03/08/21    Stress Test With Myocardial Perfusion One Day    Interpretation Summary  · Findings consistent with an abnormal ECG stress test.  · Breast attenuation artifact is present.  · Left ventricular ejection fraction is normal. (Calculated EF = 57%).  · Myocardial perfusion imaging indicates a normal myocardial perfusion study with no evidence of ischemia.  · Impressions are consistent with a low risk study.  · This is normal Cardiolite imaging stress test with no evidence of ischemia or myocardial infarction. Small apical thinning is noted which showed normal thickening and contractility consistent with attenuation artifact. Left ventricle size and function is normal on gated SPECT imaging. No wall motion abnormality was noted. Clinical correlation recommended. Further recommendation as per  ordering physician..      Most recent cardiac catheterization as reviewed interpreted by me:  No results found for this or any previous visit.    The following portions of the patient's history were reviewed and updated as appropriate: allergies, current medications, past family history, past medical history, past social history, past surgical history, and problem list.      ROS:  14 point review of systems negative except as mentioned above    Current Outpatient Medications:     amitriptyline (ELAVIL) 25 MG tablet, TAKE ONE TABLET BY MOUTH ONCE NIGHTLY, Disp: 90 tablet, Rfl: 0    aspirin 81 MG chewable tablet, Chew 1 tablet Daily., Disp: , Rfl:     ezetimibe (ZETIA) 10 MG tablet, , Disp: , Rfl:     hydroxychloroquine (PLAQUENIL) 200 MG tablet, Take  by mouth Daily., Disp: , Rfl:     levothyroxine (SYNTHROID, LEVOTHROID) 50 MCG tablet, Daily., Disp: , Rfl:     metoprolol tartrate (LOPRESSOR) 50 MG tablet, TAKE ONE TABLET BY MOUTH TWICE A DAY, Disp: 180 tablet, Rfl: 3    Omega-3 Fatty Acids (FISH OIL) 1000 MG capsule capsule, Take 1 capsule by mouth Daily With Breakfast., Disp: , Rfl:     ondansetron (Zofran) 4 MG tablet, Take 1 tablet by mouth Every 8 (Eight) Hours As Needed for Nausea or Vomiting., Disp: 30 tablet, Rfl: 1    sertraline (ZOLOFT) 100 MG tablet, TAKE ONE TABLET BY MOUTH DAILY, Disp: 180 tablet, Rfl: 1    topiramate (TOPAMAX) 100 MG tablet, TAKE ONE TABLET BY MOUTH DAILY, Disp: 90 tablet, Rfl: 2    vitamin B-12 (CYANOCOBALAMIN) 500 MCG tablet, Take 1 tablet by mouth Daily., Disp: , Rfl:     vitamin C (ASCORBIC ACID) 250 MG tablet, Take 1 tablet by mouth Daily., Disp: , Rfl:     Zinc Sulfate (ZINC 15 PO), Take  by mouth., Disp: , Rfl:     Problem List:  Patient Active Problem List   Diagnosis    Migraine with aura and without status migrainosus, not intractable    Transient cerebral ischemia    DDD (degenerative disc disease), lumbosacral    DJD (degenerative joint disease) of knee    GERD  (gastroesophageal reflux disease)    Situational depression    Aortic insufficiency    Carotid bruit    Essential hypertension    Paroxysmal atrial fibrillation    Valvular heart disease    Vitamin D deficiency    Osteoarthritis    Gastroesophageal reflux disease    Mixed hyperlipidemia    Migraine headache    Temporary cerebral vascular dysfunction    Headache    Atypical migraine    Palpitations    Obesity (BMI 30-39.9)    Other chest pain    Paresthesia    Osteoarthrosis, localized, primary, involving shoulder region    Spinal stenosis of cervicothoracic region    PAD (peripheral artery disease)    Elevated lipoprotein(a)    Weakness of both legs    Infected insect bite of left arm    Lumbar radiculopathy    Thyroid nodule    Chronic fatigue    Vitamin D deficiency, unspecified    Poisoning by vitamin D    Sjogren's syndrome with keratoconjunctivitis sicca    Umbilical hernia without obstruction and without gangrene     Past Medical History:  Past Medical History:   Diagnosis Date    Aortic stenosis, severe     Arthritis     Atypical migraine 09/24/2019    Excessive daytime sleepiness     Hyperlipidemia     Measles     Migraine     Mitral valve insufficiency     Mild to Moderate--S/p MVR    Paroxysmal atrial fibrillation 11/04/2016    PONV (postoperative nausea and vomiting)     Thyroid disease     Valvular heart disease 06/19/2019    AV replacement  Tissue 2/5/2013     Past Surgical History:  Past Surgical History:   Procedure Laterality Date    AORTIC VALVE REPAIR/REPLACEMENT  2/5/13-[facility]    Dr. Paredes    APPENDECTOMY      BREAST BIOPSY      CARDIAC CATHETERIZATION  08/24/12-[facility]    HYSTERECTOMY      REDUCTION MAMMAPLASTY      STERNOTOMY  02/5/13-[facility]    Dr. Paredes    THYROID SURGERY      UMBILICAL HERNIA REPAIR N/A 8/16/2024    Procedure: UMBILICAL HERNIA REPAIR;  Surgeon: Derek Patrick MD;  Location: [facility];  Service: General;  Laterality: N/A;     Social History:  Social History      Socioeconomic History    Marital status:     Number of children: 2   Tobacco Use    Smoking status: Never     Passive exposure: Never    Smokeless tobacco: Never   Vaping Use    Vaping status: Never Used   Substance and Sexual Activity    Alcohol use: Yes     Comment: Social    Drug use: No    Sexual activity: Defer     Birth control/protection: Surgical     Comment: Hyst     Allergies:  Allergies   Allergen Reactions    Codeine GI Intolerance     Immunizations:  Immunization History   Administered Date(s) Administered    COVID-19 (MODERNA) 1st,2nd,3rd Dose Monovalent 01/18/2021, 02/17/2021    COVID-19 (MODERNA) BIVALENT 12+YRS 03/22/2023    Fluzone High-Dose 65+YRS 11/04/2013, 10/14/2019, 09/22/2020    Hepatitis A 04/21/2018, 12/20/2018    Pneumococcal Polysaccharide (PPSV23) 12/23/2019            In-Office Procedure(s):  No orders to display        ASCVD RIsk Score::  The ASCVD Risk score (Es HUGO, et al., 2019) failed to calculate for the following reasons:    The 2019 ASCVD risk score is only valid for ages 40 to 79    Imaging:    Results for orders placed in visit on 02/10/22    SCANNED - IMAGING       Results for orders placed during the hospital encounter of 06/29/24    CT Abdomen Pelvis Without Contrast    Narrative  CT ABDOMEN PELVIS WO CONTRAST    Date of Exam: 6/29/2024 12:39 PM EDT    Indication: pain.    Comparison: None available.    Technique: Axial CT images were obtained of the abdomen and pelvis without the administration of contrast. Sagittal and coronal reconstructions were performed.  Automated exposure control and iterative reconstruction methods were used.    FINDINGS:    Lung bases: Calcified mediastinal lymph nodes consistent with prior granulomatous disease. Calcified granulomata. Aortic valve replacement.    Liver:No masses. No intrahepatic biliary ductal dilatation.    Spleen: Calcified granulomata    Pancreas:No pancreatic masses. No evidence of pancreatitis.    Gallbladder  and common bile duct:No evidence of cholelithiasis. No evidence of cholecystitis.    Adrenal glands:No adrenal masses    Kidneys and ureters:No kidney stones. No renal masses.No calculi present within the ureters. Normal caliber ureters.    Urinary bladder:No urinary bladder wall thickening. No bladder masses.    Small bowel:Normal caliber small bowel.    Large bowel:No diverticulosis or diverticulitis. No large bowel masses are appreciated    Appendix: Not seen however there is no evidence of appendicitis.    GENITOURINARY: Prior hysterectomy    Ascites or pneumoperitoneum:None.    Adenopathy:None present    Osseous structures: Degenerative changes of the hips and lumbar spine.    Other findings: None    Impression  No acute abdominal or pelvic pathology.          Electronically Signed: Delvin Farrell MD  6/29/2024 12:50 PM EDT  Workstation ID: [id number]      Results for orders placed during the hospital encounter of 06/29/24    CT Abdomen Pelvis Without Contrast    Narrative  CT ABDOMEN PELVIS WO CONTRAST    Date of Exam: 6/29/2024 12:39 PM EDT    Indication: pain.    Comparison: None available.    Technique: Axial CT images were obtained of the abdomen and pelvis without the administration of contrast. Sagittal and coronal reconstructions were performed.  Automated exposure control and iterative reconstruction methods were used.    FINDINGS:    Lung bases: Calcified mediastinal lymph nodes consistent with prior granulomatous disease. Calcified granulomata. Aortic valve replacement.    Liver:No masses. No intrahepatic biliary ductal dilatation.    Spleen: Calcified granulomata    Pancreas:No pancreatic masses. No evidence of pancreatitis.    Gallbladder and common bile duct:No evidence of cholelithiasis. No evidence of cholecystitis.    Adrenal glands:No adrenal masses    Kidneys and ureters:No kidney stones. No renal masses.No calculi present within the ureters. Normal caliber ureters.    Urinary bladder:No urinary  bladder wall thickening. No bladder masses.    Small bowel:Normal caliber small bowel.    Large bowel:No diverticulosis or diverticulitis. No large bowel masses are appreciated    Appendix: Not seen however there is no evidence of appendicitis.    GENITOURINARY: Prior hysterectomy    Ascites or pneumoperitoneum:None.    Adenopathy:None present    Osseous structures: Degenerative changes of the hips and lumbar spine.    Other findings: None    Impression  No acute abdominal or pelvic pathology.          Electronically Signed: Delvin Farrell MD  6/29/2024 12:50 PM EDT  Workstation ID: [id number]      Lab Review:   Admission on 08/16/2024, Discharged on 08/16/2024   Component Date Value    Case Report 08/16/2024                      Value:Surgical Pathology Report                         Case: [id number]                                  Authorizing Provider:  Derek Patrick MD        Collected:           08/16/2024 01:59 PM          Ordering Location:     [facility] MAIN  Received:            08/16/2024 02:45 PM                                 OR                                                                           Pathologist:           Delvin Bone MD                                                             Specimen:    Hernia, Sac, umbilical                                                                     Final Diagnosis 08/16/2024                      Value:This result contains rich text formatting which cannot be displayed here.    Gross Description 08/16/2024                      Value:This result contains rich text formatting which cannot be displayed here.   Hospital Outpatient Visit on 08/08/2024   Component Date Value    QT Interval 08/08/2024 407     QTC Interval 08/08/2024 435    Lab on 08/08/2024   Component Date Value    Glucose 08/08/2024 82     BUN 08/08/2024 29 (H)     Creatinine 08/08/2024 0.82     Sodium 08/08/2024 142     Potassium 08/08/2024 4.3     Chloride 08/08/2024 105      CO2 08/08/2024 28.8     Calcium 08/08/2024 9.6     BUN/Creatinine Ratio 08/08/2024 35.4 (H)     Anion Gap 08/08/2024 8.2     eGFR 08/08/2024 72.0     WBC 08/08/2024 5.08     RBC 08/08/2024 4.36     Hemoglobin 08/08/2024 13.8     Hematocrit 08/08/2024 41.6     MCV 08/08/2024 95.4     MCH 08/08/2024 31.7     MCHC 08/08/2024 33.2     RDW 08/08/2024 12.0 (L)     RDW-SD 08/08/2024 41.6     MPV 08/08/2024 9.9     Platelets 08/08/2024 225    Admission on 06/29/2024, Discharged on 06/29/2024   Component Date Value    Extra Tube 06/29/2024 Hold for add-ons.     Extra Tube 06/29/2024 hold for add-on     Extra Tube 06/29/2024 Hold for add-ons.     Extra Tube 06/29/2024 Hold for add-ons.     Glucose 06/29/2024 100 (H)     BUN 06/29/2024 20     Creatinine 06/29/2024 0.73     Sodium 06/29/2024 143     Potassium 06/29/2024 4.0     Chloride 06/29/2024 109 (H)     CO2 06/29/2024 26.2     Calcium 06/29/2024 9.6     Total Protein 06/29/2024 6.6     Albumin 06/29/2024 4.2     ALT (SGPT) 06/29/2024 23     AST (SGOT) 06/29/2024 26     Alkaline Phosphatase 06/29/2024 56     Total Bilirubin 06/29/2024 0.3     Globulin 06/29/2024 2.4     A/G Ratio 06/29/2024 1.8     BUN/Creatinine Ratio 06/29/2024 27.4 (H)     Anion Gap 06/29/2024 7.8     eGFR 06/29/2024 82.7     Lipase 06/29/2024 25     Color, UA 06/29/2024 Yellow     Appearance, UA 06/29/2024 Clear     pH, UA 06/29/2024 6.0     Specific Gravity, UA 06/29/2024 1.009     Glucose, UA 06/29/2024 Negative     Ketones, UA 06/29/2024 Negative     Bilirubin, UA 06/29/2024 Negative     Blood, UA 06/29/2024 Negative     Protein, UA 06/29/2024 Negative     Leuk Esterase, UA 06/29/2024 Trace (A)     Nitrite, UA 06/29/2024 Negative     Urobilinogen, UA 06/29/2024 0.2 E.U./dL     WBC 06/29/2024 4.96     RBC 06/29/2024 4.10     Hemoglobin 06/29/2024 13.0     Hematocrit 06/29/2024 39.8     MCV 06/29/2024 97.1 (H)     MCH 06/29/2024 31.7     MCHC 06/29/2024 32.7     RDW 06/29/2024 12.7     RDW-SD  06/29/2024 45.6     MPV 06/29/2024 9.9     Platelets 06/29/2024 155     Neutrophil % 06/29/2024 54.6     Lymphocyte % 06/29/2024 32.1     Monocyte % 06/29/2024 8.9     Eosinophil % 06/29/2024 3.2     Basophil % 06/29/2024 1.0     Immature Grans % 06/29/2024 0.2     Neutrophils, Absolute 06/29/2024 2.71     Lymphocytes, Absolute 06/29/2024 1.59     Monocytes, Absolute 06/29/2024 0.44     Eosinophils, Absolute 06/29/2024 0.16     Basophils, Absolute 06/29/2024 0.05     Immature Grans, Absolute 06/29/2024 0.01     nRBC 06/29/2024 0.0     RBC, UA 06/29/2024 0-2     WBC, UA 06/29/2024 0-2     Bacteria, UA 06/29/2024 None Seen     Squamous Epithelial Cell* 06/29/2024 0-2     Hyaline Casts, UA 06/29/2024 None Seen     Methodology 06/29/2024 Automated Microscopy      Recent labs reviewed and interpreted for clinical significance and application            Level of Care:           Evert Bernstein MD  10/28/24  .

## 2024-11-01 ENCOUNTER — OFFICE (AMBULATORY)
Dept: URBAN - METROPOLITAN AREA CLINIC 64 | Facility: CLINIC | Age: 84
End: 2024-11-01
Payer: MEDICARE

## 2024-11-01 ENCOUNTER — OFFICE (AMBULATORY)
Age: 84
End: 2024-11-01

## 2024-11-01 VITALS
HEART RATE: 70 BPM | WEIGHT: 181 LBS | HEART RATE: 70 BPM | HEIGHT: 66 IN | DIASTOLIC BLOOD PRESSURE: 81 MMHG | HEIGHT: 66 IN | DIASTOLIC BLOOD PRESSURE: 81 MMHG | SYSTOLIC BLOOD PRESSURE: 121 MMHG | WEIGHT: 181 LBS | SYSTOLIC BLOOD PRESSURE: 121 MMHG

## 2024-11-01 DIAGNOSIS — R15.9 FULL INCONTINENCE OF FECES: ICD-10-CM

## 2024-11-01 DIAGNOSIS — R19.4 CHANGE IN BOWEL HABIT: ICD-10-CM

## 2024-11-01 PROCEDURE — 99213 OFFICE O/P EST LOW 20 MIN: CPT | Performed by: NURSE PRACTITIONER

## 2024-11-26 RX ORDER — METOPROLOL TARTRATE 50 MG
50 TABLET ORAL 2 TIMES DAILY
Qty: 180 TABLET | Refills: 3 | Status: SHIPPED | OUTPATIENT
Start: 2024-11-26

## 2025-01-20 ENCOUNTER — TRANSCRIBE ORDERS (OUTPATIENT)
Dept: ADMINISTRATIVE | Facility: HOSPITAL | Age: 85
End: 2025-01-20
Payer: MEDICARE

## 2025-01-20 DIAGNOSIS — Z12.31 BREAST CANCER SCREENING BY MAMMOGRAM: Primary | ICD-10-CM

## 2025-01-28 ENCOUNTER — HOSPITAL ENCOUNTER (OUTPATIENT)
Dept: MAMMOGRAPHY | Facility: HOSPITAL | Age: 85
Discharge: HOME | End: 2025-01-28
Admitting: INTERNAL MEDICINE
Payer: MEDICARE

## 2025-01-28 DIAGNOSIS — Z12.31 BREAST CANCER SCREENING BY MAMMOGRAM: ICD-10-CM

## 2025-01-28 PROCEDURE — 77067 SCR MAMMO BI INCL CAD: CPT

## 2025-01-28 PROCEDURE — 77063 BREAST TOMOSYNTHESIS BI: CPT

## 2025-03-03 ENCOUNTER — OFFICE (AMBULATORY)
Dept: URBAN - METROPOLITAN AREA CLINIC 64 | Facility: CLINIC | Age: 85
End: 2025-03-03
Payer: MEDICARE

## 2025-03-03 VITALS
HEART RATE: 71 BPM | SYSTOLIC BLOOD PRESSURE: 122 MMHG | DIASTOLIC BLOOD PRESSURE: 78 MMHG | WEIGHT: 186 LBS | HEIGHT: 66 IN

## 2025-03-03 DIAGNOSIS — R15.9 FULL INCONTINENCE OF FECES: ICD-10-CM

## 2025-03-03 PROCEDURE — 99212 OFFICE O/P EST SF 10 MIN: CPT | Performed by: NURSE PRACTITIONER

## (undated) DEVICE — BG DRN DISPOSAL DEPOT W/TBG 24IN 600ML

## (undated) DEVICE — PK MAJ LAPAROTOMY 50

## (undated) DEVICE — KT SURG TURNOVER 050

## (undated) DEVICE — DRSNG WND BORDR/ADHS NONADHR/GZ LF 4X4IN STRL

## (undated) DEVICE — PENCL SMOKE/EVAC MEGADYNE TELESCP 15FT

## (undated) DEVICE — ANTIBACTERIAL UNDYED BRAIDED (POLYGLACTIN 910), SYNTHETIC ABSORBABLE SUTURE: Brand: COATED VICRYL

## (undated) DEVICE — GLV SURG BIOGEL LTX PF 7

## (undated) DEVICE — 3M™ STERI-STRIP™ REINFORCED ADHESIVE SKIN CLOSURES, R1547, 1/2 IN X 4 IN (12 MM X 100 MM), 6 STRIPS/ENVELOPE: Brand: 3M™ STERI-STRIP™

## (undated) DEVICE — SUT ETHIB 0/0 MO6 I8IN CX45D

## (undated) DEVICE — BLANKT WARM UPPR/BDY ARM/OUT 57X196CM

## (undated) DEVICE — UNDERGLV SURG BIOGEL INDICATOR LF PF 7.5

## (undated) DEVICE — PRESSURE MONITORING ACCESSORY: Brand: TRUWAVE

## (undated) DEVICE — SOLUTION,WATER,IRRIGATION,1000ML,STERILE: Brand: MEDLINE

## (undated) DEVICE — TRY DRN PNEUMO WAYNE ST 14F 19SD/PRT 29CM W/SUT

## (undated) DEVICE — SOL IRRIG NACL 1000ML